# Patient Record
Sex: FEMALE | Race: WHITE | NOT HISPANIC OR LATINO | Employment: PART TIME | URBAN - NONMETROPOLITAN AREA
[De-identification: names, ages, dates, MRNs, and addresses within clinical notes are randomized per-mention and may not be internally consistent; named-entity substitution may affect disease eponyms.]

---

## 2017-04-27 ENCOUNTER — OFFICE VISIT (OUTPATIENT)
Dept: MEDICAL GROUP | Facility: CLINIC | Age: 47
End: 2017-04-27
Payer: MEDICAID

## 2017-04-27 VITALS
DIASTOLIC BLOOD PRESSURE: 80 MMHG | SYSTOLIC BLOOD PRESSURE: 118 MMHG | WEIGHT: 234 LBS | RESPIRATION RATE: 16 BRPM | BODY MASS INDEX: 38.99 KG/M2 | HEART RATE: 83 BPM | HEIGHT: 65 IN | TEMPERATURE: 98.8 F | OXYGEN SATURATION: 98 %

## 2017-04-27 DIAGNOSIS — K21.9 GASTROESOPHAGEAL REFLUX DISEASE WITHOUT ESOPHAGITIS: ICD-10-CM

## 2017-04-27 DIAGNOSIS — E03.9 ACQUIRED HYPOTHYROIDISM: ICD-10-CM

## 2017-04-27 DIAGNOSIS — M54.2 CHRONIC NECK PAIN: ICD-10-CM

## 2017-04-27 DIAGNOSIS — G89.29 CHRONIC NECK PAIN: ICD-10-CM

## 2017-04-27 DIAGNOSIS — J45.909 UNCOMPLICATED ASTHMA, UNSPECIFIED ASTHMA SEVERITY: ICD-10-CM

## 2017-04-27 DIAGNOSIS — Z79.891 CHRONIC USE OF OPIATE FOR THERAPEUTIC PURPOSE: ICD-10-CM

## 2017-04-27 DIAGNOSIS — M54.5 CHRONIC MIDLINE LOW BACK PAIN, WITH SCIATICA PRESENCE UNSPECIFIED: ICD-10-CM

## 2017-04-27 DIAGNOSIS — E55.9 VITAMIN D DEFICIENCY: ICD-10-CM

## 2017-04-27 DIAGNOSIS — M54.50 CHRONIC BILATERAL LOW BACK PAIN WITHOUT SCIATICA: ICD-10-CM

## 2017-04-27 DIAGNOSIS — G89.29 CHRONIC BILATERAL LOW BACK PAIN WITHOUT SCIATICA: ICD-10-CM

## 2017-04-27 DIAGNOSIS — E55.9 UNSPECIFIED VITAMIN D DEFICIENCY: ICD-10-CM

## 2017-04-27 DIAGNOSIS — G89.29 CHRONIC MIDLINE LOW BACK PAIN, WITH SCIATICA PRESENCE UNSPECIFIED: ICD-10-CM

## 2017-04-27 DIAGNOSIS — M79.7 FIBROMYALGIA: ICD-10-CM

## 2017-04-27 PROCEDURE — 99205 OFFICE O/P NEW HI 60 MIN: CPT | Performed by: PHYSICIAN ASSISTANT

## 2017-04-27 RX ORDER — OXYCODONE AND ACETAMINOPHEN 10; 325 MG/1; MG/1
1 TABLET ORAL 3 TIMES DAILY PRN
Qty: 90 TAB | Refills: 0 | Status: SHIPPED | OUTPATIENT
Start: 2017-04-27 | End: 2017-05-30

## 2017-04-27 RX ORDER — GABAPENTIN 100 MG/1
100 CAPSULE ORAL 2 TIMES DAILY
Qty: 90 CAP | Refills: 0 | Status: SHIPPED | OUTPATIENT
Start: 2017-04-27 | End: 2017-05-30

## 2017-04-27 RX ORDER — OXYCODONE AND ACETAMINOPHEN 10; 325 MG/1; MG/1
1 TABLET ORAL 3 TIMES DAILY PRN
Qty: 90 TAB | Refills: 0 | Status: SHIPPED | OUTPATIENT
Start: 2017-04-27 | End: 2017-04-27 | Stop reason: SDUPTHER

## 2017-04-27 RX ORDER — ALBUTEROL SULFATE 90 UG/1
2 AEROSOL, METERED RESPIRATORY (INHALATION) EVERY 6 HOURS PRN
Qty: 1 INHALER | Refills: 3 | Status: SHIPPED | OUTPATIENT
Start: 2017-04-27 | End: 2017-05-30

## 2017-04-27 RX ORDER — OMEPRAZOLE 40 MG/1
40 CAPSULE, DELAYED RELEASE ORAL 2 TIMES DAILY
COMMUNITY
End: 2017-04-27 | Stop reason: SDUPTHER

## 2017-04-27 RX ORDER — LIDOCAINE HYDROCHLORIDE 40 MG/ML
1 SOLUTION TOPICAL PRN
Qty: 1 BOTTLE | Refills: 0 | Status: SHIPPED | OUTPATIENT
Start: 2017-04-27 | End: 2017-05-16 | Stop reason: SDUPTHER

## 2017-04-27 RX ORDER — PROMETHAZINE HYDROCHLORIDE 25 MG/1
25 TABLET ORAL EVERY 6 HOURS PRN
COMMUNITY
End: 2017-04-27 | Stop reason: SDUPTHER

## 2017-04-27 RX ORDER — GABAPENTIN 100 MG/1
100 CAPSULE ORAL 2 TIMES DAILY
COMMUNITY
End: 2017-04-27 | Stop reason: SDUPTHER

## 2017-04-27 RX ORDER — ERGOCALCIFEROL 1.25 MG/1
50000 CAPSULE ORAL
Qty: 12 CAP | Refills: 0 | Status: SHIPPED | OUTPATIENT
Start: 2017-04-27 | End: 2017-09-08

## 2017-04-27 RX ORDER — LEVOTHYROXINE SODIUM 0.12 MG/1
125 TABLET ORAL
COMMUNITY
End: 2017-04-27 | Stop reason: SDUPTHER

## 2017-04-27 RX ORDER — LEVOTHYROXINE SODIUM 0.12 MG/1
125 TABLET ORAL
Qty: 60 TAB | Refills: 3 | Status: SHIPPED | OUTPATIENT
Start: 2017-04-27 | End: 2017-09-08

## 2017-04-27 RX ORDER — LIDOCAINE HYDROCHLORIDE 40 MG/ML
SOLUTION TOPICAL PRN
COMMUNITY
End: 2017-04-27 | Stop reason: SDUPTHER

## 2017-04-27 RX ORDER — PROMETHAZINE HYDROCHLORIDE 25 MG/1
25 TABLET ORAL PRN
Qty: 16 TAB | Refills: 0 | Status: SHIPPED | OUTPATIENT
Start: 2017-04-27 | End: 2017-09-08

## 2017-04-27 RX ORDER — OMEPRAZOLE 40 MG/1
40 CAPSULE, DELAYED RELEASE ORAL 2 TIMES DAILY
Qty: 60 CAP | Refills: 2 | Status: SHIPPED | OUTPATIENT
Start: 2017-04-27 | End: 2017-05-30

## 2017-04-27 ASSESSMENT — PAIN SCALES - GENERAL: PAINLEVEL: 8=MODERATE-SEVERE PAIN

## 2017-04-27 ASSESSMENT — PATIENT HEALTH QUESTIONNAIRE - PHQ9: CLINICAL INTERPRETATION OF PHQ2 SCORE: 0

## 2017-04-27 NOTE — ASSESSMENT & PLAN NOTE
"On discussion of her renewal for her opioids she was asked to choose between her Percocet and her tizanidine and the patient became extremely agitated. She states that she has been on this regimen for approximately 6 years and asked \"So you're smarter than these 12 other doctors that have treated me in the past? You know better then these super-specialists who have been treating me for years?\" Patient was extremely rude however when asked if she would rather find a different physician she agreed to accept the 90 Percocet that were offered.  "

## 2017-04-27 NOTE — ASSESSMENT & PLAN NOTE
Patient states this is an ongoing problem that is well-controlled with her Percocet and she would like to be seen by a rheumatologist for this. We will discuss this at a later visit.
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
## 2017-04-27 NOTE — MR AVS SNAPSHOT
"        Jayashree SANCHEZ Danilo   2017 3:00 PM   Office Visit   MRN: [id number]    Department:  [facility] Phone:  [phone]    Description:  Female : 1970   Provider:  Elenita Friend PA-C           Reason for Visit     Other new to you    Medication Refill           Allergies as of 2017     Allergen Noted Reactions    Cipro Xr 2010   Rash    Sulfa Drugs 2008         You were diagnosed with     Chronic use of opiate for therapeutic purpose   [6386999]       Chronic neck pain   [081131]       Gastroesophageal reflux disease without esophagitis   [973429]       Unspecified vitamin D deficiency   [268.9.ICD-9-CM]       Chronic bilateral low back pain without sciatica   [8722205]       Uncomplicated asthma, unspecified asthma severity   [9535299]       Acquired hypothyroidism   [2159163]       Chronic midline low back pain, with sciatica presence unspecified   [4657107]         Vital Signs     Blood Pressure Pulse Temperature Respirations Height Weight    118/80 mmHg 83 37.1 °C (98.8 °F) 16 1.651 m (5' 5\") 106.142 kg (234 lb)    Body Mass Index Oxygen Saturation Smoking Status             38.94 kg/m2 98% Never Smoker          Basic Information     Date Of Birth Sex Race Ethnicity Preferred Language    1970 Female White Non- English      Your appointments     May 18, 2017  1:00 PM   New Patient with Nevin Grant M.D.   [facility] [address] [address] [phone]           Please bring Photo ID, Insurance Cards, All Medication Bottles and copies of any legal documents (such as Living Will, Power of ) If speaking a language besides English please bring an adult . Please arrive 30 minutes prior for check in and registration. You will be receiving a confirmation call a few days before your appointment from our automated call confirmation system.              Problem List             " ICD-10-CM Priority Class Noted - Resolved    Hypothyroidism E03.9   6/7/2012 - Present    Fibromyalgia M79.7   6/7/2012 - Present    Chronic low back pain M54.5, G89.29   4/12/2014 - Present    Chronic neck pain M54.2, G89.29   4/12/2014 - Present    Vitamin D deficiency E55.9   4/12/2014 - Present    Dysuria R30.0   4/12/2014 - Present      Health Maintenance        Date Due Completion Dates    IMM DTaP/Tdap/Td Vaccine (1 - Tdap) 6/29/1989 ---    PAP SMEAR 6/29/1991 ---    MAMMOGRAM 6/29/2010 3/17/2009, 3/17/2009            Current Immunizations     No immunizations on file.      Below and/or attached are the medications your provider expects you to take. Review all of your home medications and newly ordered medications with your provider and/or pharmacist. Follow medication instructions as directed by your provider and/or pharmacist. Please keep your medication list with you and share with your provider. Update the information when medications are discontinued, doses are changed, or new medications (including over-the-counter products) are added; and carry medication information at all times in the event of emergency situations     Allergies:  CIPRO XR - Rash     SULFA DRUGS - (reactions not documented)               Medications  Valid as of: April 27, 2017 -  4:37 PM    Generic Name Brand Name Tablet Size Instructions for use    Albuterol Sulfate (Aero Soln) albuterol 108 (90 BASE) MCG/ACT Inhale 2 Puffs by mouth every 6 hours as needed for Shortness of Breath.        Ergocalciferol (Cap) DRISDOL 74458 UNITS Take 1 Cap by mouth every 7 days.        Gabapentin (Cap) NEURONTIN 100 MG Take 1 Cap by mouth 2 Times a Day.        Levothyroxine Sodium (Tab) SYNTHROID 125 MCG Take 1 Tab by mouth Every morning on an empty stomach. As well as PO at least 1 hour after dinner.        Lidocaine HCl (Solution) XYLOCAINE 4 % Apply 1 mL to affected area(s) as needed (for pain).        Omeprazole (CAPSULE DELAYED RELEASE) PRILOSEC  40 MG Take 1 Cap by mouth 2 times a day.        Oxycodone-Acetaminophen (Tab) PERCOCET-10  MG Take 1 Tab by mouth 3 times a day as needed for Severe Pain.        Promethazine HCl (Tab) PHENERGAN 25 MG Take 1 Tab by mouth as needed for Nausea/Vomiting.        TiZANidine HCl (Tab) ZANAFLEX 4 MG Take 2 Tabs by mouth every 8 hours as needed.        .                 Medicines prescribed today were sent to:     [facility] - [place], [place] - [address] [facility]    [address] [facility] [address] [facility] [address] [facility] [address]    Phone: [phone] Fax: [phone]    Open 24 Hours?: No      Medication refill instructions:       If your prescription bottle indicates you have medication refills left, it is not necessary to call your provider’s office. Please contact your pharmacy and they will refill your medication.    If your prescription bottle indicates you do not have any refills left, you may request refills at any time through one of the following ways: The online [employer] system (except Urgent Care), by calling your provider’s office, or by asking your pharmacy to contact your provider’s office with a refill request. Medication refills are processed only during regular business hours and may not be available until the next business day. Your provider may request additional information or to have a follow-up visit with you prior to refilling your medication.   *Please Note: Medication refills are assigned a new Rx number when refilled electronically. Your pharmacy may indicate that no refills were authorized even though a new prescription for the same medication is available at the pharmacy. Please request the medicine by name with the pharmacy before contacting your provider for a refill.        Your To Do List     Future Labs/Procedures Complete By Expires    DX-CERVICAL SPINE-2 OR 3 VIEWS  As directed 4/27/2018    DX-LUMBAR SPINE-2 OR 3 VIEWS  As directed 4/27/2018    DX-THORACIC SPINE-2 VIEWS  As directed  4/27/2018    [facility] PAIN MANAGEMENT SCREEN  As directed 4/27/2018    Comments:    Current Meds (name, sig, last dose):   Current outpatient prescriptions:   •  albuterol, 2 Puff, Inhalation, Q6HRS PRN  •  tramadol,  mg, Oral, Q4HRS PRN  •  tizanidine, 8 mg, Oral, Q8HRS PRN  •  pantoprazole, 20 mg, Oral, DAILY  •  vitamin D (Ergocalciferol), 50,000 Units, Oral, Q7 DAYS  •  oxycodone-acetaminophen, 1 Tab, Oral, Q6HRS PRN  •  levothyroxine, 112 mcg, Oral, BID            Referral     A referral request has been sent to our patient care coordination department. Please allow 3-5 business days for us to process this request and contact you either by phone or mail. If you do not hear from us by the 5th business day, please call us at [phone].           [employer] Access Code: [email or web address]  Expires: 5/27/2017  4:37 PM    Your email address is not on file at [facility].  Email Addresses are required for you to sign up for [employer], please contact [phone] to verify your personal information and to provide your email address prior to attempting to register for [employer].    Jayashree Carrasco  [address] [place] [id number]    [employer]  A secure, online tool to manage your health information     [facility]’s [employer]® is a secure, online tool that connects you to your personalized health information from the privacy of your home -- day or night - making it very easy for you to manage your healthcare. Once the activation process is completed, you can even access your medical information using the [employer] jasmeet, which is available for free in the Apple Jasmeet store or Google Play store.     To learn more about [employer], visit [email or web address]    There are two levels of access available (as shown below):   My Chart Features  [facility]  Specialists [facility]  Urgent  Care Non-[facility] Doctor   Email your healthcare team securely and privately 24/7 X X X    Manage  appointments: schedule your next appointment; view details of past/upcoming appointments X      Request prescription refills. X      View recent personal medical records, including lab and immunizations X X X X   View health record, including health history, allergies, medications X X X X   Read reports about your outpatient visits, procedures, consult and ER notes X X X X   See your discharge summary, which is a recap of your hospital and/or ER visit that includes your diagnosis, lab results, and care plan X X  X     How to register for [employer]:  Once your e-mail address has been verified, follow the following steps to sign up for [employer].     1. Go to  [email or web address]  2. Click on the Sign Up Now box, which takes you to the New Member Sign Up page. You will need to provide the following information:  a. Enter your Us[employer] Access Code exactly as it appears at the top of this page. (You will not need to use this code after you’ve completed the sign-up process. If you do not sign up before the expiration date, you must request a new code.)   b. Enter your date of birth.   c. Enter your home email address.   d. Click Submit, and follow the next screen’s instructions.  3. Create a Us[employer]illa ID. This will be your [employer] login ID and cannot be changed, so think of one that is secure and easy to remember.  4. Create a [employer] password. You can change your password at any time.  5. Enter your Password Reset Question and Answer. This can be used at a later time if you forget your password.   6. Enter your e-mail address. This allows you to receive e-mail notifications when new information is available in Us[employer]illa.  7. Click Sign Up. You can now view your health information.    For assistance activating your [employer] account, call [phone]

## 2017-05-02 ENCOUNTER — TELEPHONE (OUTPATIENT)
Dept: MEDICAL GROUP | Facility: CLINIC | Age: 47
End: 2017-05-02

## 2017-05-02 DIAGNOSIS — R89.2 ABNORMAL DRUG SCREEN: ICD-10-CM

## 2017-05-02 NOTE — TELEPHONE ENCOUNTER
Your information has been submitted to [place] [employer].  If [place] [employer] has not responded in 3-5 business days or if you have any questions about your PA submission, contact [place] [employer] at [phone].

## 2017-05-04 NOTE — TELEPHONE ENCOUNTER
DANK Vaughn, Med Ass't        Caller: Unspecified (Yesterday, 7:26 AM)                     This patient can get prilosec over the counter if medicaid won't pay for it.

## 2017-05-16 DIAGNOSIS — M54.2 CHRONIC NECK PAIN: ICD-10-CM

## 2017-05-16 DIAGNOSIS — G89.29 CHRONIC NECK PAIN: ICD-10-CM

## 2017-05-16 DIAGNOSIS — Z79.891 CHRONIC USE OF OPIATE FOR THERAPEUTIC PURPOSE: ICD-10-CM

## 2017-05-18 ENCOUNTER — OFFICE VISIT (OUTPATIENT)
Dept: URGENT CARE | Facility: PHYSICIAN GROUP | Age: 47
End: 2017-05-18
Payer: MEDICAID

## 2017-05-18 ENCOUNTER — APPOINTMENT (OUTPATIENT)
Dept: RADIOLOGY | Facility: IMAGING CENTER | Age: 47
End: 2017-05-18
Attending: NURSE PRACTITIONER
Payer: MEDICAID

## 2017-05-18 ENCOUNTER — TELEPHONE (OUTPATIENT)
Dept: MEDICAL GROUP | Facility: CLINIC | Age: 47
End: 2017-05-18

## 2017-05-18 ENCOUNTER — HOSPITAL ENCOUNTER (OUTPATIENT)
Facility: MEDICAL CENTER | Age: 47
End: 2017-05-18
Attending: NURSE PRACTITIONER
Payer: MEDICAID

## 2017-05-18 VITALS
TEMPERATURE: 97.7 F | OXYGEN SATURATION: 97 % | SYSTOLIC BLOOD PRESSURE: 126 MMHG | HEART RATE: 84 BPM | DIASTOLIC BLOOD PRESSURE: 80 MMHG | RESPIRATION RATE: 20 BRPM | HEIGHT: 64 IN | WEIGHT: 236 LBS | BODY MASS INDEX: 40.29 KG/M2

## 2017-05-18 DIAGNOSIS — J02.9 SORE THROAT: ICD-10-CM

## 2017-05-18 DIAGNOSIS — R31.9 HEMATURIA: ICD-10-CM

## 2017-05-18 DIAGNOSIS — J06.9 URI WITH COUGH AND CONGESTION: ICD-10-CM

## 2017-05-18 DIAGNOSIS — R05.8 PRODUCTIVE COUGH: ICD-10-CM

## 2017-05-18 DIAGNOSIS — R31.9 URINARY TRACT INFECTION WITH HEMATURIA, SITE UNSPECIFIED: ICD-10-CM

## 2017-05-18 DIAGNOSIS — R30.0 DYSURIA: ICD-10-CM

## 2017-05-18 DIAGNOSIS — N39.0 URINARY TRACT INFECTION WITH HEMATURIA, SITE UNSPECIFIED: ICD-10-CM

## 2017-05-18 LAB
APPEARANCE UR: CLEAR
BILIRUB UR STRIP-MCNC: NORMAL MG/DL
COLOR UR AUTO: NORMAL
GLUCOSE UR STRIP.AUTO-MCNC: NORMAL MG/DL
KETONES UR STRIP.AUTO-MCNC: 5 MG/DL
LEUKOCYTE ESTERASE UR QL STRIP.AUTO: NORMAL
NITRITE UR QL STRIP.AUTO: NORMAL
PH UR STRIP.AUTO: 6 [PH] (ref 5–8)
PROT UR QL STRIP: NORMAL MG/DL
RBC UR QL AUTO: NORMAL
SP GR UR STRIP.AUTO: 1.03
UROBILINOGEN UR STRIP-MCNC: NORMAL MG/DL

## 2017-05-18 PROCEDURE — 74000 DX-ABDOMEN-1 VIEW: CPT | Performed by: FAMILY MEDICINE

## 2017-05-18 PROCEDURE — 87086 URINE CULTURE/COLONY COUNT: CPT

## 2017-05-18 PROCEDURE — 99204 OFFICE O/P NEW MOD 45 MIN: CPT | Mod: 25 | Performed by: NURSE PRACTITIONER

## 2017-05-18 PROCEDURE — 81002 URINALYSIS NONAUTO W/O SCOPE: CPT | Performed by: NURSE PRACTITIONER

## 2017-05-18 RX ORDER — BENZONATATE 100 MG/1
100 CAPSULE ORAL 3 TIMES DAILY PRN
Qty: 60 CAP | Refills: 0 | Status: SHIPPED | OUTPATIENT
Start: 2017-05-18 | End: 2017-05-30

## 2017-05-18 RX ORDER — ALBUTEROL SULFATE 2.5 MG/3ML
2.5 SOLUTION RESPIRATORY (INHALATION) EVERY 4 HOURS PRN
Qty: 75 ML | Refills: 0 | Status: SHIPPED | OUTPATIENT
Start: 2017-05-18 | End: 2017-05-30 | Stop reason: SDUPTHER

## 2017-05-18 RX ORDER — LIDOCAINE HYDROCHLORIDE 40 MG/ML
1 SOLUTION TOPICAL PRN
Qty: 1 BOTTLE | Refills: 0 | Status: SHIPPED | OUTPATIENT
Start: 2017-05-18 | End: 2017-05-30

## 2017-05-18 RX ORDER — ALBUTEROL SULFATE 90 UG/1
2 AEROSOL, METERED RESPIRATORY (INHALATION) EVERY 6 HOURS PRN
Qty: 8.5 G | Refills: 0 | Status: SHIPPED | OUTPATIENT
Start: 2017-05-18 | End: 2017-11-07 | Stop reason: SDUPTHER

## 2017-05-18 RX ORDER — CEFDINIR 300 MG/1
300 CAPSULE ORAL 2 TIMES DAILY
Qty: 20 CAP | Refills: 0 | Status: SHIPPED | OUTPATIENT
Start: 2017-05-18 | End: 2017-05-30

## 2017-05-18 ASSESSMENT — ENCOUNTER SYMPTOMS
HEADACHES: 0
FEVER: 0
PALPITATIONS: 0
SHORTNESS OF BREATH: 0
FLANK PAIN: 0
NAUSEA: 0
MYALGIAS: 1
WEAKNESS: 0
EYE REDNESS: 0
EYE DISCHARGE: 0
BACK PAIN: 1
CHILLS: 0
SPUTUM PRODUCTION: 1
DIZZINESS: 0
SORE THROAT: 1
CONSTIPATION: 0
VOMITING: 0
WHEEZING: 0
DIARRHEA: 0
ABDOMINAL PAIN: 0
ORTHOPNEA: 0
COUGH: 1

## 2017-05-18 NOTE — TELEPHONE ENCOUNTER
1. Caller Name: pt.                                         Call Back Number: [phone] (home) [phone]      Patient approves a detailed voicemail message: N\A    2. Patient is requesting lab results dated: [facility] 4/27/17     Pt. Called stating she was seen in [facility] and was provided her after visit disha that stated she had an abnormal drug screen. She would like clarification on this and why it shows abnormal.

## 2017-05-18 NOTE — PROGRESS NOTES
Subjective:      Jayashree Carrasco is a 46 y.o. female who presents with Cough            Cough  Associated symptoms include ear pain, myalgias and a sore throat. Pertinent negatives include no chest pain, chills, eye redness, fever, headaches, shortness of breath or wheezing. There is no history of environmental allergies.   Jayashree is a 46 year old female who is here for productive cough x 1 week. C/o nasal congestion, sore throat, no fever, PND, ear pressure. Denies dizziness, sinus HA, denies sinus facial pressure. Grandson has cough as well. Taking no OTC med for cough. No saline nasal flushing, nasal spray or salt water gargling. Denies SOB, chest tightness or wheeze. Denies asthma. Nonsmoker.    C/o dysuria, frequency. States has had darker urine and some back pain. Has had kidney infection before. Denies n/v/d, abdominal pain. H/o chronic back pain and takes Percocet. States full hysterectomy.    PMH:  has a past medical history of Cancer (CMS-[facility]); Psychiatric disorder; Fibromyalgia; ASTHMA; GERD (gastroesophageal reflux disease); Chronic low back pain (4/12/2014); Chronic neck pain (4/12/2014); and Unspecified vitamin D deficiency (4/12/2014).  MEDS:   Current outpatient prescriptions:   •  cefdinir (OMNICEF) 300 MG Cap, Take 1 Cap by mouth 2 times a day., Disp: 20 Cap, Rfl: 0  •  lidocaine viscous 2% (XYLOCAINE) 2 % Solution, Take 15 mL by mouth 4 times a day as needed for Throat/Mouth Pain for up to 3 days. Gargle and spit out, Disp: 180 mL, Rfl: 0  •  benzonatate (TESSALON) 100 MG Cap, Take 1 Cap by mouth 3 times a day as needed for Cough., Disp: 60 Cap, Rfl: 0  •  albuterol 108 (90 BASE) MCG/ACT Aero Soln inhalation aerosol, Inhale 2 Puffs by mouth every 6 hours as needed for Shortness of Breath., Disp: 1 Inhaler, Rfl: 3  •  gabapentin (NEURONTIN) 100 MG Cap, Take 1 Cap by mouth 2 Times a Day., Disp: 90 Cap, Rfl: 0  •  levothyroxine (SYNTHROID) 125 MCG Tab, Take 1 Tab by mouth Every morning on an empty  stomach. As well as PO at least 1 hour after dinner., Disp: 60 Tab, Rfl: 3  •  omeprazole (PRILOSEC) 40 MG delayed-release capsule, Take 1 Cap by mouth 2 times a day., Disp: 60 Cap, Rfl: 2  •  promethazine (PHENERGAN) 25 MG Tab, Take 1 Tab by mouth as needed for Nausea/Vomiting., Disp: 16 Tab, Rfl: 0  •  vitamin D, Ergocalciferol, (DRISDOL) 71768 UNITS Cap capsule, Take 1 Cap by mouth every 7 days., Disp: 12 Cap, Rfl: 0  •  oxycodone-acetaminophen (PERCOCET-10)  MG Tab, Take 1 Tab by mouth 3 times a day as needed for Severe Pain., Disp: 90 Tab, Rfl: 0  •  tizanidine (ZANAFLEX) 4 MG TABS, Take 2 Tabs by mouth every 8 hours as needed. (Patient taking differently: Take 6 mg by mouth every 8 hours as needed.), Disp: 120 Tab, Rfl: 2  •  lidocaine (XYLOCAINE) 4 % Solution, Apply 1 mL to affected area(s) as needed (for pain)., Disp: 1 Bottle, Rfl: 0  ALLERGIES:   Allergies   Allergen Reactions   • Cipro Xr Rash   • Sulfa Drugs      SURGHX:   Past Surgical History   Procedure Laterality Date   • Cholecystectomy     • Thyroidectomy     • Other abdominal surgery     • Other       SOCHX:  reports that she has never smoked. She has never used smokeless tobacco. She reports that she does not drink alcohol or use illicit drugs.  FH: Family history was reviewed, no pertinent findings to report      Review of Systems   Constitutional: Negative for fever, chills and malaise/fatigue.   HENT: Positive for congestion, ear pain and sore throat.    Eyes: Negative for discharge and redness.   Respiratory: Positive for cough and sputum production. Negative for shortness of breath and wheezing.    Cardiovascular: Negative for chest pain, palpitations and orthopnea.   Gastrointestinal: Negative for nausea, vomiting, abdominal pain, diarrhea and constipation.   Genitourinary: Positive for dysuria, urgency and frequency. Negative for hematuria and flank pain.   Musculoskeletal: Positive for myalgias and back pain.   Neurological:  "Negative for dizziness, weakness and headaches.   Endo/Heme/Allergies: Negative for environmental allergies.   All other systems reviewed and are negative.         Objective:     /80 mmHg  Pulse 84  Temp(Src) 36.5 °C (97.7 °F)  Resp 20  Ht 1.626 m (5' 4\")  Wt 107.049 kg (236 lb)  BMI 40.49 kg/m2  SpO2 97%     Physical Exam   Constitutional: She is oriented to person, place, and time. Vital signs are normal. She appears well-developed and well-nourished.  Non-toxic appearance. She does not have a sickly appearance. She appears ill. No distress.   HENT:   Head: Normocephalic.   Right Ear: External ear and ear canal normal. Tympanic membrane is bulging. A middle ear effusion is present.   Left Ear: External ear and ear canal normal. Tympanic membrane is bulging. A middle ear effusion is present.   Nose: Mucosal edema and rhinorrhea present. No sinus tenderness.   Mouth/Throat: Uvula is midline. Mucous membranes are dry. No uvula swelling. Posterior oropharyngeal erythema present. No posterior oropharyngeal edema.   Eyes: Conjunctivae and EOM are normal. Pupils are equal, round, and reactive to light.   Neck: Normal range of motion. Neck supple.   Cardiovascular: Normal rate and regular rhythm.    Pulmonary/Chest: Effort normal and breath sounds normal. No accessory muscle usage. No respiratory distress. She has no decreased breath sounds. She has no wheezes. She has no rhonchi. She has no rales.   Musculoskeletal: Normal range of motion.   Lymphadenopathy:     She has no cervical adenopathy.   Neurological: She is oriented to person, place, and time.   Skin: Skin is warm and dry. She is not diaphoretic.   Vitals reviewed.          POC Color dk yellow Negative Final      POC Appearance clear Negative Final     POC Leukocyte Esterase neg Negative Final     POC Nitrites neg Negative Final     POC Urobiligen neg Negative (0.2) mg/dL Final     POC Protein neg Negative mg/dL Final     POC Urine PH 6.0 5.0 - 8.0 " Final     POC Blood small Negative Final     POC Specific Gravity 1.030 <1.005 - >1.030 Final     POC Ketones 5 Negative mg/dL Final     POC Biliruben neg Negative mg/dL Final     POC Glucose neg Negative mg/dL Final     KUB xray:  1.  No dilated bowel or evidence for constipation  2.  No opaque calculi.  3.  Facet arthropathy of the spine       Assessment/Plan:     1. Dysuria    2. Hematuria    - POCT Urinalysis  - [id number] VIEW; Future  - URINE CULTURE(NEW); Future    3. Sore throat    - POCT Rapid Strep A: NEG  - lidocaine viscous 2% (XYLOCAINE) 2 % Solution; Take 15 mL by mouth 4 times a day as needed for Throat/Mouth Pain for up to 3 days. Gargle and spit out  Dispense: 180 mL; Refill: 0    4. Urinary tract infection with hematuria, site unspecified    - cefdinir (OMNICEF) 300 MG Cap; Take 1 Cap by mouth 2 times a day.  Dispense: 20 Cap; Refill: 0    5. Productive cough    - benzonatate (TESSALON) 100 MG Cap; Take 1 Cap by mouth 3 times a day as needed for Cough.  Dispense: 60 Cap; Refill: 0  - albuterol 108 (90 BASE) MCG/ACT Aero Soln inhalation aerosol; Inhale 2 Puffs by mouth every 6 hours as needed for Shortness of Breath.  Dispense: 8.5 g; Refill: 0  - albuterol (PROVENTIL) 2.5mg/3ml Nebu Soln solution for nebulization; 3 mL by Nebulization route every four hours as needed for Shortness of Breath.  Dispense: 75 mL; Refill: 0    6. URI with cough and congestion    - cefdinir (OMNICEF) 300 MG Cap; Take 1 Cap by mouth 2 times a day.  Dispense: 20 Cap; Refill: 0  - benzonatate (TESSALON) 100 MG Cap; Take 1 Cap by mouth 3 times a day as needed for Cough.  Dispense: 60 Cap; Refill: 0  - albuterol 108 (90 BASE) MCG/ACT Aero Soln inhalation aerosol; Inhale 2 Puffs by mouth every 6 hours as needed for Shortness of Breath.  Dispense: 8.5 g; Refill: 0  - albuterol (PROVENTIL) 2.5mg/3ml Nebu Soln solution for nebulization; 3 mL by Nebulization route every four hours as needed for Shortness of Breath.  Dispense: 75  mL; Refill: 0    Increase water intake  May use Ibuprofen/Tylenol prn for fever or body aches  Get rest  May use daily longer acting antihistamine like Claritin prn for allergy symptoms  May use saline nasal spray/flonase prn to flush nasal congestion  Use inhaler prn for cough, SOB  May use OTC cough suppressant medications like Robitussin prn  Monitor for fevers, productive cough, SOB, CP, chest tightness- need re-evaluation    Increase water intake  Urinate more frequently and empty bladder completely  Practice good toileting hygiene after bowel movements and sexual intercourse, refrain from sexual intercourse until infection cleared  Monitor for back/flank pain, difficulty urinating, blood in urine- need re-evaluation

## 2017-05-18 NOTE — MR AVS SNAPSHOT
"Lydiae LAURA Carrasco   2017 1:40 PM   Office Visit   MRN: [id number]    Department:  [facility]   Dept Phone:  [phone]    Description:  Female : 1970   Provider:  SHOLA Quinn           Reason for Visit     Cough nasal/ chest congestion, ear pain x 1.5 week      Allergies as of 2017     Allergen Noted Reactions    Cipro Xr 2010   Rash    Sulfa Drugs 2008         You were diagnosed with     Dysuria   [788.1.ICD-9-CM]       Hematuria   [1557055]       Sore throat   [476613]       Urinary tract infection with hematuria, site unspecified   [4127163]       Productive cough   [107539]       URI with cough and congestion   [6174025]         Vital Signs     Blood Pressure Pulse Temperature Respirations Height Weight    126/80 mmHg 84 36.5 °C (97.7 °F) 20 1.626 m (5' 4\") 107.049 kg (236 lb)    Body Mass Index Oxygen Saturation Smoking Status             40.49 kg/m2 97% Never Smoker          Basic Information     Date Of Birth Sex Race Ethnicity Preferred Language    1970 Female White Non- English      Your appointments     May 24, 2017  3:00 PM   Established Patient with Elenita Friend PA-C   [facility] (--)    [address] [place] [id number] [phone]           You will be receiving a confirmation call a few days before your appointment from our automated call confirmation system.              Problem List              ICD-10-CM Priority Class Noted - Resolved    Hypothyroidism E03.9   2012 - Present    Fibromyalgia M79.7   2012 - Present    Chronic low back pain M54.5, G89.29   2014 - Present    Chronic neck pain M54.2, G89.29   2014 - Present    Vitamin D deficiency E55.9   2014 - Present    Dysuria R30.0   2014 - Present    Chronic use of opiate for therapeutic purpose Z79.891   2017 - Present    Abnormal drug screen R89.2   2017 - Present      Health Maintenance       " Date Due Completion Dates    IMM DTaP/Tdap/Td Vaccine (1 - Tdap) 6/29/1989 ---    PAP SMEAR 6/29/1991 ---    MAMMOGRAM 6/29/2010 3/17/2009, 3/17/2009            Results     POCT Urinalysis      Component Value Standard Range & Units    POC Color dk yellow Negative    POC Appearance clear Negative    POC Leukocyte Esterase neg Negative    POC Nitrites neg Negative    POC Urobiligen neg Negative (0.2) mg/dL    POC Protein neg Negative mg/dL    POC Urine PH 6.0 5.0 - 8.0    POC Blood small Negative    POC Specific Gravity 1.030 <1.005 - >1.030    POC Ketones 5 Negative mg/dL    POC Biliruben neg Negative mg/dL    POC Glucose neg Negative mg/dL                        Current Immunizations     No immunizations on file.      Below and/or attached are the medications your provider expects you to take. Review all of your home medications and newly ordered medications with your provider and/or pharmacist. Follow medication instructions as directed by your provider and/or pharmacist. Please keep your medication list with you and share with your provider. Update the information when medications are discontinued, doses are changed, or new medications (including over-the-counter products) are added; and carry medication information at all times in the event of emergency situations     Allergies:  CIPRO XR - Rash     SULFA DRUGS - (reactions not documented)               Medications  Valid as of: May 18, 2017 -  3:18 PM    Generic Name Brand Name Tablet Size Instructions for use    Albuterol Sulfate (Aero Soln) albuterol 108 (90 BASE) MCG/ACT Inhale 2 Puffs by mouth every 6 hours as needed for Shortness of Breath.        Benzonatate (Cap) TESSALON 100 MG Take 1 Cap by mouth 3 times a day as needed for Cough.        Cefdinir (Cap) OMNICEF 300 MG Take 1 Cap by mouth 2 times a day.        Ergocalciferol (Cap) DRISDOL 43024 UNITS Take 1 Cap by mouth every 7 days.        Gabapentin (Cap) NEURONTIN 100 MG Take 1 Cap by mouth 2 Times a  Day.        Levothyroxine Sodium (Tab) SYNTHROID 125 MCG Take 1 Tab by mouth Every morning on an empty stomach. As well as PO at least 1 hour after dinner.        Lidocaine HCl (Solution) XYLOCAINE 4 % Apply 1 mL to affected area(s) as needed (for pain).        Lidocaine HCl (Solution) XYLOCAINE 2 % Take 15 mL by mouth 4 times a day as needed for Throat/Mouth Pain for up to 3 days. Gargle and spit out        Omeprazole (CAPSULE DELAYED RELEASE) PRILOSEC 40 MG Take 1 Cap by mouth 2 times a day.        Oxycodone-Acetaminophen (Tab) PERCOCET-10  MG Take 1 Tab by mouth 3 times a day as needed for Severe Pain.        Promethazine HCl (Tab) PHENERGAN 25 MG Take 1 Tab by mouth as needed for Nausea/Vomiting.        TiZANidine HCl (Tab) ZANAFLEX 4 MG Take 2 Tabs by mouth every 8 hours as needed.        .                 Medicines prescribed today were sent to:     [facility] - [address] [facility] [address]    Phone: [phone] Fax: [phone]    Open 24 Hours?: No      Medication refill instructions:       If your prescription bottle indicates you have medication refills left, it is not necessary to call your provider’s office. Please contact your pharmacy and they will refill your medication.    If your prescription bottle indicates you do not have any refills left, you may request refills at any time through one of the following ways: The online [email or web address] system (except Urgent Care), by calling your provider’s office, or by asking your pharmacy to contact your provider’s office with a refill request. Medication refills are processed only during regular business hours and may not be available until the next business day. Your provider may request additional information or to have a follow-up visit with you prior to refilling your medication.   *Please Note: Medication refills are assigned a new Rx number when refilled electronically. Your pharmacy  may indicate that no refills were authorized even though a new prescription for the same medication is available at the pharmacy. Please request the medicine by name with the pharmacy before contacting your provider for a refill.        Your To Do List     Future Labs/Procedures Complete By Expires    [id number] VIEW  As directed 11/18/2017    URINE CULTURE(NEW)  As directed 5/19/2018         CiraNova Access Code: [email or web address]  Expires: 5/27/2017  4:37 PM    Your email address is not on file at [facility].  Email Addresses are required for you to sign up for CiraNova, please contact [phone] to verify your personal information and to provide your email address prior to attempting to register for CiraNova.    Jayashree Carrasco  [facility] [place] [id number]    CiraNova  A secure, online tool to manage your health information     [facility]’s CiraNova® is a secure, online tool that connects you to your personalized health information from the privacy of your home -- day or night - making it very easy for you to manage your healthcare. Once the activation process is completed, you can even access your medical information using the CiraNova jasmeet, which is available for free in the Apple Jasmeet store or Google Play store.     To learn more about CiraNova, visit [email or web address]    There are two levels of access available (as shown below):   My Chart Features  [facility] Primary Care Doctor [facility]  Specialists [facility]  Urgent  Care Non-[facility] Primary Care Doctor   Email your healthcare team securely and privately 24/7 X X X    Manage appointments: schedule your next appointment; view details of past/upcoming appointments X      Request prescription refills. X      View recent personal medical records, including lab and immunizations X X X X   View health record, including health history, allergies, medications X X X X   Read reports about your outpatient visits, procedures, consult and ER notes X X X X    See your discharge summary, which is a recap of your hospital and/or ER visit that includes your diagnosis, lab results, and care plan X X  X     How to register for [employer]:  Once your e-mail address has been verified, follow the following steps to sign up for CommonBond.     1. Go to  [email or web address]  2. Click on the Sign Up Now box, which takes you to the New Member Sign Up page. You will need to provide the following information:  a. Enter your CommonBond Access Code exactly as it appears at the top of this page. (You will not need to use this code after you’ve completed the sign-up process. If you do not sign up before the expiration date, you must request a new code.)   b. Enter your date of birth.   c. Enter your home email address.   d. Click Submit, and follow the next screen’s instructions.  3. Create a CommonBond ID. This will be your CommonBond login ID and cannot be changed, so think of one that is secure and easy to remember.  4. Create a CommonBond password. You can change your password at any time.  5. Enter your Password Reset Question and Answer. This can be used at a later time if you forget your password.   6. Enter your e-mail address. This allows you to receive e-mail notifications when new information is available in CommonBond.  7. Click Sign Up. You can now view your health information.    For assistance activating your CommonB[employer] account, call [phone]

## 2017-05-20 ENCOUNTER — TELEPHONE (OUTPATIENT)
Dept: MEDICAL GROUP | Facility: CLINIC | Age: 47
End: 2017-05-20

## 2017-05-20 LAB
BACTERIA UR CULT: ABNORMAL
BACTERIA UR CULT: ABNORMAL
SIGNIFICANT IND 70042: ABNORMAL
SITE SITE: ABNORMAL
SOURCE SOURCE: ABNORMAL

## 2017-05-20 NOTE — TELEPHONE ENCOUNTER
1. Caller Name: Jayashree Carrasco                                      Call Back Number: [phone]      Patient approves a detailed voicemail message: yes    Patient called asking about her upcoming appointment time.  She also had questions on why on her AVS it said she had a abnormal drug screen.  I tried to explain it to her she will talk to you about it on Wednesday at her appointment.

## 2017-05-21 ENCOUNTER — APPOINTMENT (OUTPATIENT)
Dept: RADIOLOGY | Facility: MEDICAL CENTER | Age: 47
End: 2017-05-21
Attending: EMERGENCY MEDICINE
Payer: MEDICAID

## 2017-05-21 ENCOUNTER — HOSPITAL ENCOUNTER (EMERGENCY)
Facility: MEDICAL CENTER | Age: 47
End: 2017-05-21
Attending: EMERGENCY MEDICINE
Payer: MEDICAID

## 2017-05-21 VITALS
WEIGHT: 231 LBS | HEIGHT: 64 IN | RESPIRATION RATE: 24 BRPM | TEMPERATURE: 98.1 F | SYSTOLIC BLOOD PRESSURE: 107 MMHG | DIASTOLIC BLOOD PRESSURE: 72 MMHG | BODY MASS INDEX: 39.44 KG/M2 | HEART RATE: 64 BPM | OXYGEN SATURATION: 95 %

## 2017-05-21 DIAGNOSIS — J40 BRONCHITIS: ICD-10-CM

## 2017-05-21 DIAGNOSIS — D72.829 LEUKOCYTOSIS, UNSPECIFIED TYPE: ICD-10-CM

## 2017-05-21 DIAGNOSIS — R07.1 PAIN OF ANTERIOR CHEST WALL WITH RESPIRATION: ICD-10-CM

## 2017-05-21 LAB
ALBUMIN SERPL BCP-MCNC: 3.9 G/DL (ref 3.2–4.9)
ALBUMIN/GLOB SERPL: 1.3 G/DL
ALP SERPL-CCNC: 66 U/L (ref 30–99)
ALT SERPL-CCNC: 8 U/L (ref 2–50)
ANION GAP SERPL CALC-SCNC: 9 MMOL/L (ref 0–11.9)
APPEARANCE UR: CLEAR
APTT PPP: 29.2 SEC (ref 24.7–36)
AST SERPL-CCNC: 9 U/L (ref 12–45)
BASOPHILS # BLD AUTO: 0.2 % (ref 0–1.8)
BASOPHILS # BLD: 0.04 K/UL (ref 0–0.12)
BILIRUB SERPL-MCNC: 0.3 MG/DL (ref 0.1–1.5)
BILIRUB UR QL STRIP.AUTO: NEGATIVE
BNP SERPL-MCNC: 32 PG/ML (ref 0–100)
BUN SERPL-MCNC: 10 MG/DL (ref 8–22)
CALCIUM SERPL-MCNC: 8.6 MG/DL (ref 8.5–10.5)
CHLORIDE SERPL-SCNC: 109 MMOL/L (ref 96–112)
CO2 SERPL-SCNC: 19 MMOL/L (ref 20–33)
COLOR UR: YELLOW
CREAT SERPL-MCNC: 0.61 MG/DL (ref 0.5–1.4)
CULTURE IF INDICATED INDCX: NO UA CULTURE
EOSINOPHIL # BLD AUTO: 0.02 K/UL (ref 0–0.51)
EOSINOPHIL NFR BLD: 0.1 % (ref 0–6.9)
ERYTHROCYTE [DISTWIDTH] IN BLOOD BY AUTOMATED COUNT: 49.7 FL (ref 35.9–50)
GFR SERPL CREATININE-BSD FRML MDRD: >60 ML/MIN/1.73 M 2
GLOBULIN SER CALC-MCNC: 3 G/DL (ref 1.9–3.5)
GLUCOSE SERPL-MCNC: 125 MG/DL (ref 65–99)
GLUCOSE UR STRIP.AUTO-MCNC: NEGATIVE MG/DL
HCT VFR BLD AUTO: 40.2 % (ref 37–47)
HGB BLD-MCNC: 12.9 G/DL (ref 12–16)
IMM GRANULOCYTES # BLD AUTO: 0.17 K/UL (ref 0–0.11)
IMM GRANULOCYTES NFR BLD AUTO: 0.9 % (ref 0–0.9)
INR PPP: 1.17 (ref 0.87–1.13)
KETONES UR STRIP.AUTO-MCNC: NEGATIVE MG/DL
LEUKOCYTE ESTERASE UR QL STRIP.AUTO: NEGATIVE
LIPASE SERPL-CCNC: 20 U/L (ref 11–82)
LYMPHOCYTES # BLD AUTO: 1.93 K/UL (ref 1–4.8)
LYMPHOCYTES NFR BLD: 10.7 % (ref 22–41)
MCH RBC QN AUTO: 26.9 PG (ref 27–33)
MCHC RBC AUTO-ENTMCNC: 32.1 G/DL (ref 33.6–35)
MCV RBC AUTO: 83.8 FL (ref 81.4–97.8)
MICRO URNS: NORMAL
MONOCYTES # BLD AUTO: 0.73 K/UL (ref 0–0.85)
MONOCYTES NFR BLD AUTO: 4 % (ref 0–13.4)
NEUTROPHILS # BLD AUTO: 15.15 K/UL (ref 2–7.15)
NEUTROPHILS NFR BLD: 84.1 % (ref 44–72)
NITRITE UR QL STRIP.AUTO: NEGATIVE
NRBC # BLD AUTO: 0 K/UL
NRBC BLD AUTO-RTO: 0 /100 WBC
PH UR STRIP.AUTO: 5 [PH]
PLATELET # BLD AUTO: 305 K/UL (ref 164–446)
PMV BLD AUTO: 10.2 FL (ref 9–12.9)
POTASSIUM SERPL-SCNC: 4 MMOL/L (ref 3.6–5.5)
PROT SERPL-MCNC: 6.9 G/DL (ref 6–8.2)
PROT UR QL STRIP: NEGATIVE MG/DL
PROTHROMBIN TIME: 15.3 SEC (ref 12–14.6)
RBC # BLD AUTO: 4.8 M/UL (ref 4.2–5.4)
RBC UR QL AUTO: NEGATIVE
SODIUM SERPL-SCNC: 137 MMOL/L (ref 135–145)
SP GR UR STRIP.AUTO: 1.02
TROPONIN I SERPL-MCNC: <0.01 NG/ML (ref 0–0.04)
WBC # BLD AUTO: 18 K/UL (ref 4.8–10.8)

## 2017-05-21 PROCEDURE — 83690 ASSAY OF LIPASE: CPT

## 2017-05-21 PROCEDURE — 81003 URINALYSIS AUTO W/O SCOPE: CPT

## 2017-05-21 PROCEDURE — 96375 TX/PRO/DX INJ NEW DRUG ADDON: CPT

## 2017-05-21 PROCEDURE — 99285 EMERGENCY DEPT VISIT HI MDM: CPT

## 2017-05-21 PROCEDURE — 80053 COMPREHEN METABOLIC PANEL: CPT

## 2017-05-21 PROCEDURE — 85610 PROTHROMBIN TIME: CPT

## 2017-05-21 PROCEDURE — 83880 ASSAY OF NATRIURETIC PEPTIDE: CPT

## 2017-05-21 PROCEDURE — 36415 COLL VENOUS BLD VENIPUNCTURE: CPT

## 2017-05-21 PROCEDURE — 71010 DX-CHEST-LIMITED (1 VIEW): CPT

## 2017-05-21 PROCEDURE — 84484 ASSAY OF TROPONIN QUANT: CPT

## 2017-05-21 PROCEDURE — 700111 HCHG RX REV CODE 636 W/ 250 OVERRIDE (IP): Performed by: EMERGENCY MEDICINE

## 2017-05-21 PROCEDURE — 85025 COMPLETE CBC W/AUTO DIFF WBC: CPT

## 2017-05-21 PROCEDURE — 96374 THER/PROPH/DIAG INJ IV PUSH: CPT

## 2017-05-21 PROCEDURE — 93005 ELECTROCARDIOGRAM TRACING: CPT | Performed by: EMERGENCY MEDICINE

## 2017-05-21 PROCEDURE — 700105 HCHG RX REV CODE 258: Performed by: EMERGENCY MEDICINE

## 2017-05-21 PROCEDURE — 85730 THROMBOPLASTIN TIME PARTIAL: CPT

## 2017-05-21 RX ORDER — ONDANSETRON 2 MG/ML
4 INJECTION INTRAMUSCULAR; INTRAVENOUS ONCE
Status: COMPLETED | OUTPATIENT
Start: 2017-05-21 | End: 2017-05-21

## 2017-05-21 RX ORDER — MORPHINE SULFATE 4 MG/ML
4 INJECTION, SOLUTION INTRAMUSCULAR; INTRAVENOUS ONCE
Status: COMPLETED | OUTPATIENT
Start: 2017-05-21 | End: 2017-05-21

## 2017-05-21 RX ORDER — PREDNISONE 20 MG/1
40 TABLET ORAL DAILY
Qty: 10 TAB | Refills: 0 | Status: SHIPPED | OUTPATIENT
Start: 2017-05-21 | End: 2017-05-26

## 2017-05-21 RX ORDER — SODIUM CHLORIDE 9 MG/ML
1000 INJECTION, SOLUTION INTRAVENOUS ONCE
Status: COMPLETED | OUTPATIENT
Start: 2017-05-21 | End: 2017-05-21

## 2017-05-21 RX ADMIN — MORPHINE SULFATE 4 MG: 4 INJECTION INTRAVENOUS at 08:25

## 2017-05-21 RX ADMIN — ONDANSETRON 4 MG: 2 INJECTION INTRAMUSCULAR; INTRAVENOUS at 08:25

## 2017-05-21 RX ADMIN — SODIUM CHLORIDE 1000 ML: 9 INJECTION, SOLUTION INTRAVENOUS at 08:25

## 2017-05-21 ASSESSMENT — ENCOUNTER SYMPTOMS
COUGH: 1
VOMITING: 0
ABDOMINAL PAIN: 0
HEADACHES: 0
SHORTNESS OF BREATH: 1
DIZZINESS: 0
NAUSEA: 1
FLANK PAIN: 1
FEVER: 0

## 2017-05-21 NOTE — ED PROVIDER NOTES
"ED Provider Note    Scribed for Lucía Okeefe D.O. by Lety Carrillo. 5/21/2017, 7:46 AM.    Primary care provider: Elenita Friend PA-C  Means of arrival: EMS  History obtained from: Patient  History limited by: None     CHIEF COMPLAINT  Chief Complaint   Patient presents with   • Chest Pain       HPI  Jayashree Carrasco is a 46 y.o. female who presents to the Emergency Department for cough, ingestion, chest pain and right back pain, but the patient's had since Friday. Patient's chest pain worsened this morning which prompted her to come to the emergency department. Patient reports recent history of upper respiratory infection with sore throat, congestion, cough, and dysuria  four days ago. She states she was seen at urgent care and was discharged with Omnicef antibiotic, albuterol breathing treatment, a new inhaler, and Tessalon  four days ago. Chest pain worsens with cough. Cough is nonproductive. She also has a history of asthma.  She used inhaler but denies relief of symptoms. Patient reports history of asthma. Patient reports improved chest pain after baby aspirin en route to hospital with EMS. She states nausea at this time.  Denies fever. Denies history of smoking. Patient reports history of chest pain approximately five year ago with a cardiac workup, she states her \"troponin was off\" and was recommended to take baby aspirin.     REVIEW OF SYSTEMS  Review of Systems   Constitutional: Negative for fever.   Respiratory: Positive for cough and shortness of breath.    Cardiovascular: Positive for chest pain.   Gastrointestinal: Positive for nausea. Negative for vomiting and abdominal pain.   Genitourinary: Positive for dysuria and flank pain.   Neurological: Negative for dizziness and headaches.   All other systems reviewed and are negative.      PAST MEDICAL HISTORY   has a past medical history of Cancer ([facility]); Psychiatric disorder; Fibromyalgia; ASTHMA; GERD (gastroesophageal reflux disease); Chronic low " back pain (4/12/2014); Chronic neck pain (4/12/2014); and Unspecified vitamin D deficiency (4/12/2014).    SURGICAL HISTORY   has past surgical history that includes cholecystectomy; thyroidectomy; other abdominal surgery; and other.    SOCIAL HISTORY  Social History   Substance Use Topics   • Smoking status: Never Smoker    • Smokeless tobacco: Never Used   • Alcohol Use: No      History   Drug Use No       FAMILY HISTORY  Patient did not reports pertinent family history     CURRENT MEDICATIONS  No current facility-administered medications on file prior to encounter.     Current Outpatient Prescriptions on File Prior to Encounter   Medication Sig Dispense Refill   • lidocaine (XYLOCAINE) 4 % Solution Apply 1 mL to affected area(s) as needed (for pain). 1 Bottle 0   • cefdinir (OMNICEF) 300 MG Cap Take 1 Cap by mouth 2 times a day. 20 Cap 0   • lidocaine viscous 2% (XYLOCAINE) 2 % Solution Take 15 mL by mouth 4 times a day as needed for Throat/Mouth Pain for up to 3 days. Gargle and spit out 180 mL 0   • benzonatate (TESSALON) 100 MG Cap Take 1 Cap by mouth 3 times a day as needed for Cough. 60 Cap 0   • albuterol 108 (90 BASE) MCG/ACT Aero Soln inhalation aerosol Inhale 2 Puffs by mouth every 6 hours as needed for Shortness of Breath. 8.5 g 0   • albuterol (PROVENTIL) 2.5mg/3ml Nebu Soln solution for nebulization 3 mL by Nebulization route every four hours as needed for Shortness of Breath. 75 mL 0   • albuterol 108 (90 BASE) MCG/ACT Aero Soln inhalation aerosol Inhale 2 Puffs by mouth every 6 hours as needed for Shortness of Breath. 1 Inhaler 3   • gabapentin (NEURONTIN) 100 MG Cap Take 1 Cap by mouth 2 Times a Day. 90 Cap 0   • levothyroxine (SYNTHROID) 125 MCG Tab Take 1 Tab by mouth Every morning on an empty stomach. As well as PO at least 1 hour after dinner. 60 Tab 3   • omeprazole (PRILOSEC) 40 MG delayed-release capsule Take 1 Cap by mouth 2 times a day. 60 Cap 2   • promethazine (PHENERGAN) 25 MG Tab Take  "1 Tab by mouth as needed for Nausea/Vomiting. 16 Tab 0   • vitamin D, Ergocalciferol, (DRISDOL) 89618 UNITS Cap capsule Take 1 Cap by mouth every 7 days. 12 Cap 0   • oxycodone-acetaminophen (PERCOCET-10)  MG Tab Take 1 Tab by mouth 3 times a day as needed for Severe Pain. 90 Tab 0   • tizanidine (ZANAFLEX) 4 MG TABS Take 2 Tabs by mouth every 8 hours as needed. (Patient taking differently: Take 6 mg by mouth every 8 hours as needed.) 120 Tab 2     ALLERGIES  Allergies   Allergen Reactions   • Cipro Xr Rash   • Sulfa Drugs        PHYSICAL EXAM  VITAL SIGNS: /72 mmHg  Pulse 66  Temp(Src) 36.7 °C (98.1 °F)  Resp 18  Ht 1.626 m (5' 4\")  Wt 104.781 kg (231 lb)  BMI 39.63 kg/m2  SpO2 98%  Vitals reviewed.  Constitutional: Patient is oriented to person, place, and time. Appears well-developed and well-nourished. No distress.    Head: Normocephalic and atraumatic.   Ears: Normal external ears bilaterally.   Mouth/Throat: Oropharynx is clear and moist, no exudates.   Eyes: Conjunctivae are normal. Pupils are equal, round, and reactive to light.   Neck: Normal range of motion. Neck supple.  Cardiovascular: Normal rate, regular rhythm and normal heart sounds. Normal peripheral pulses.  Pulmonary/Chest: Effort normal and breath sounds normal. No respiratory distress, no wheezes, rhonchi, or rales. diffuse chest wall tenderness.   Abdominal: Soft. Bowel sounds are normal. There is no tenderness, rebound or guarding, or peritoneal signs. Right CVA tenderness.  Musculoskeletal: No edema and no tenderness.   Lymphadenopathy: No cervical adenopathy.   Neurological: No focal deficits.   Skin: Skin is warm and dry. No erythema. No pallor.   Psychiatric: Patient has a normal mood and affect.     LABS  Results for orders placed or performed during the hospital encounter of 05/21/17   Troponin   Result Value Ref Range    Troponin I <0.01 0.00 - 0.04 ng/mL   Btype Natriuretic Peptide   Result Value Ref Range    B " Natriuretic Peptide 32 0 - 100 pg/mL   CBC with Differential   Result Value Ref Range    WBC 18.0 (H) 4.8 - 10.8 K/uL    RBC 4.80 4.20 - 5.40 M/uL    Hemoglobin 12.9 12.0 - 16.0 g/dL    Hematocrit 40.2 37.0 - 47.0 %    MCV 83.8 81.4 - 97.8 fL    MCH 26.9 (L) 27.0 - 33.0 pg    MCHC 32.1 (L) 33.6 - 35.0 g/dL    RDW 49.7 35.9 - 50.0 fL    Platelet Count 305 164 - 446 K/uL    MPV 10.2 9.0 - 12.9 fL    Neutrophils-Polys 84.10 (H) 44.00 - 72.00 %    Lymphocytes 10.70 (L) 22.00 - 41.00 %    Monocytes 4.00 0.00 - 13.40 %    Eosinophils 0.10 0.00 - 6.90 %    Basophils 0.20 0.00 - 1.80 %    Immature Granulocytes 0.90 0.00 - 0.90 %    Nucleated RBC 0.00 /100 WBC    Neutrophils (Absolute) 15.15 (H) 2.00 - 7.15 K/uL    Lymphs (Absolute) 1.93 1.00 - 4.80 K/uL    Monos (Absolute) 0.73 0.00 - 0.85 K/uL    Eos (Absolute) 0.02 0.00 - 0.51 K/uL    Baso (Absolute) 0.04 0.00 - 0.12 K/uL    Immature Granulocytes (abs) 0.17 (H) 0.00 - 0.11 K/uL    NRBC (Absolute) 0.00 K/uL   Complete Metabolic Panel (CMP)   Result Value Ref Range    Sodium 137 135 - 145 mmol/L    Potassium 4.0 3.6 - 5.5 mmol/L    Chloride 109 96 - 112 mmol/L    Co2 19 (L) 20 - 33 mmol/L    Anion Gap 9.0 0.0 - 11.9    Glucose 125 (H) 65 - 99 mg/dL    Bun 10 8 - 22 mg/dL    Creatinine 0.61 0.50 - 1.40 mg/dL    Calcium 8.6 8.5 - 10.5 mg/dL    AST(SGOT) 9 (L) 12 - 45 U/L    ALT(SGPT) 8 2 - 50 U/L    Alkaline Phosphatase 66 30 - 99 U/L    Total Bilirubin 0.3 0.1 - 1.5 mg/dL    Albumin 3.9 3.2 - 4.9 g/dL    Total Protein 6.9 6.0 - 8.2 g/dL    Globulin 3.0 1.9 - 3.5 g/dL    A-G Ratio 1.3 g/dL   Prothrombin Time   Result Value Ref Range    PT 15.3 (H) 12.0 - 14.6 sec    INR 1.17 (H) 0.87 - 1.13   APTT   Result Value Ref Range    APTT 29.2 24.7 - 36.0 sec   Lipase   Result Value Ref Range    Lipase 20 11 - 82 U/L   ESTIMATED GFR   Result Value Ref Range    GFR If African American >60 >60 mL/min/1.73 m 2    GFR If Non African American >60 >60 mL/min/1.73 m 2   URINALYSIS,CULTURE IF  INDICATED   Result Value Ref Range    Color Yellow     Character Clear     Specific Gravity 1.019 <1.035    Ph 5.0 5.0-8.0    Glucose Negative Negative mg/dL    Ketones Negative Negative mg/dL    Protein Negative Negative mg/dL    Bilirubin Negative Negative    Nitrite Negative Negative    Leukocyte Esterase Negative Negative    Occult Blood Negative Negative    Micro Urine Req see below     Culture Indicated No UA Culture   EKG (ER)   Result Value Ref Range    Report       [facility] Emergency Dept.    Test Date:  2017  Pt Name:    CORAL ZAMAN                 Department: ER  MRN:        [id number]                      Room:       [facility]  Gender:     F                            Technician: [id number]  :        1970                   Requested By:SERENA DOWLING  Order #:    [id number]                    Reading MD:    Measurements  Intervals                                Axis  Rate:       68                           P:          33  LA:         148                          QRS:        36  QRSD:       86                           T:          30  QT:         400  QTc:        426    Interpretive Statements  SINUS RHYTHM  EARLY PRECORDIAL R/S TRANSITION  Compared to ECG 2016 01:43:16  No significant changes       All labs reviewed by me.    EKG Interpretation  Interpreted by me    Rhythm: normal sinus   Rate: 68  Axis: normal  Ectopy: none  Conduction: normal  ST Segments: no acute change  T Waves: no acute change  Q Waves: none    Clinical Impression: no acute changes from comparison EKG , normal EKG     RADIOLOGY  DX-CHEST-LIMITED (1 VIEW)   Final Result         No acute cardiac or pulmonary abnormality is identified.        The radiologist's interpretation of all radiological studies have been reviewed by me.    COURSE & MEDICAL DECISION MAKING  Pertinent Labs & Imaging studies reviewed. (See chart for details)      7:46 AM - Patient seen and examined at bedside. This patient  presents with cough, congestion, pleuritic type chest pain. She's been taking Omnicef for a few days for what appears to be a upper respiratory infection as well as urinary tract infection. She is also having right flank pain. She's not had a fever. She is not tachycardic, tachypneic or hypoxic. She has no personal history of DVT. She's PERC negative.      Patient will be treated with NS infusion 1,000 mL. Ordered DX-Chest portable, DX-Chest limited, Urinalysis, Troponin, B-Type Natriuretic Peptide, CBC with differential, PTT, CMP, APTT, Lipase, APTT, Estimated GFR, EKG, Oxygen Protocol, (see all lab orders) to evaluate her symptoms. The differential diagnoses include but are not limited to: Bronchitis/ pneumonia, viral syndrome, UTI and pyelonephritis, less likely ACS    11:06 AM Recheck: Patient re-evaluated at beside. Patient reports feeling improved. Urine was unremarkable. No blood or no evidence of infection. I do not think she needs imaging at this time. She continues to have normal vital signs. She satting well on room air. No evidence of pneumonia on x-ray. She does have an elevated white blood cell count which may be resolving from prior illness or viral in its etiology. At this time, the patient safe for discharge to home. I do think she can benefit from a short course of steroids. She's had been given cough medicine. She is given strict return precautions. I suspect, she will continually improve however, if not, she is advised to return for reevaluation. Discussed patient's condition and treatment plan. Patient's lab and radiology results discussed. I informed patient above findings were normal. The patient understood and is in agreement.     she'll be discharged home in stable condition.      FINAL IMPRESSION  1. Bronchitis    2. Pain of anterior chest wall with respiration        PRESCRIPTIONS  Discharge Medication List as of 5/21/2017 11:17 AM      START taking these medications    Details    predniSONE (DELTASONE) 20 MG Tab Take 2 Tabs by mouth every day for 5 days., Disp-10 Tab, R-0, Print Rx Paper             FOLLOW UP  Elenita Friend PA-C  [address]  [facility] [address] [phone]    In 2 days      [facility], Emergency Dept  [address] [facility] [address] [phone]    If symptoms worsen        -DISCHARGE-     ILety (Scribe), am scribing for, and in the presence of, Lucía Okeefe D.O..    Electronically signed by: Lety Carrillo (Danoibdenisha), 5/21/2017    ILucía D.O. personally performed the services described in this documentation, as scribed by Lety Carrillo in my presence, and it is both accurate and complete.    The note accurately reflects work and decisions made by me.  Lucía Okeefe  5/21/2017  12:57 PM

## 2017-05-21 NOTE — ED AVS SNAPSHOT
5/21/2017    Jayashree Carrasco  [address] [facility] [address]    Dear Jayashree:    [facility] wants to ensure your discharge home is safe and you or your loved ones have had all of your questions answered regarding your care after you leave the hospital.    Below is a list of resources and contact information should you have any questions regarding your hospital stay, follow-up instructions, or active medical symptoms.    Questions or Concerns Regarding… Contact   Medical Questions Related to Your Discharge  (7 days a week, 8am-5pm) Contact a Nurse Care Coordinator   [phone]   Medical Questions Not Related to Your Discharge  (24 hours a day / 7 days a week)  Contact the Nurse Health Line   [phone]    Medications or Discharge Instructions Refer to your discharge packet   or contact your [facility] Primary Care Provider   [phone]   Follow-up Appointment(s) Schedule your appointment via "[employer] ([email or web address])"   or contact Scheduling [phone]   Billing Review your statement via "[email or web address])"  or contact Billing [phone]   Medical Records Review your records via "[email or web address])"   or contact Medical Records [phone]     You may receive a telephone call within two days of discharge. This call is to make certain you understand your discharge instructions and have the opportunity to have any questions answered. You can also easily access your medical information, test results and upcoming appointments via the "[email or web address] free online health management tool. You can learn more and sign up at [employer]/"[employer] ([email or web address])". For assistance setting up your "[email or web address] account, please call [phone].    Once again, we want to ensure your discharge home is safe and that you have a clear understanding of any next steps in your care. If you have any questions or concerns, please do not hesitate to contact us, we are here for you. Thank you for choosing [facility] for your healthcare needs.    Sincerely,    Your [facility] Healthcare Team

## 2017-05-21 NOTE — ED AVS SNAPSHOT
[employer] Access Code: [email or web address]  Expires: 5/27/2017  4:37 PM    Your email address is not on file at [facility].  Email Addresses are required for you to sign up for [employer], please contact [phone] to verify your personal information and to provide your email address prior to attempting to register for [employer].    Jayashree Carrasco  [address] [place] [address], [place] [address]    [employer]  A secure, online tool to manage your health information     [facility]’s [employer]® is a secure, online tool that connects you to your personalized health information from the privacy of your home -- day or night - making it very easy for you to manage your healthcare. Once the activation process is completed, you can even access your medical information using the [employer] jasmeet, which is available for free in the Apple Jasmeet store or Google Play store.     To learn more about [employer], visit [email or web address]    There are two levels of access available (as shown below):   My Chart Features  [facility] Primary Care Doctor [facility]  Specialists [facility]  Urgent  Care Non-[facility] Primary Care Doctor   Email your healthcare team securely and privately 24/7 X X X    Manage appointments: schedule your next appointment; view details of past/upcoming appointments X      Request prescription refills. X      View recent personal medical records, including lab and immunizations X X X X   View health record, including health history, allergies, medications X X X X   Read reports about your outpatient visits, procedures, consult and ER notes X X X X   See your discharge summary, which is a recap of your hospital and/or ER visit that includes your diagnosis, lab results, and care plan X X  X     How to register for [employer]:  Once your e-mail address has been verified, follow the following steps to sign up for [employer].     1. Go to  [email or web address]  2. Click on the Sign Up Now box, which takes you to the New Member Sign Up page. You  will need to provide the following information:  a. Enter your [employer] Access Code exactly as it appears at the top of this page. (You will not need to use this code after you’ve completed the sign-up process. If you do not sign up before the expiration date, you must request a new code.)   b. Enter your date of birth.   c. Enter your home email address.   d. Click Submit, and follow the next screen’s instructions.  3. Create a [employer] ID. This will be your [employer]inate login ID and cannot be changed, so think of one that is secure and easy to remember.  4. Create a [employer] password. You can change your password at any time.  5. Enter your Password Reset Question and Answer. This can be used at a later time if you forget your password.   6. Enter your e-mail address. This allows you to receive e-mail notifications when new information is available in [employer].  7. Click Sign Up. You can now view your health information.    For assistance activating your [employer] account, call [phone]

## 2017-05-21 NOTE — DISCHARGE INSTRUCTIONS
Bronchitis  Bronchitis is the body's way of reacting to injury and/or infection (inflammation) of the bronchi. Bronchi are the air tubes that extend from the windpipe into the lungs. If the inflammation becomes severe, it may cause shortness of breath.  CAUSES   Inflammation may be caused by:  · A virus.  · Germs (bacteria).  · Dust.  · Allergens.  · Pollutants and many other irritants.  The cells lining the bronchial tree are covered with tiny hairs (cilia). These constantly beat upward, away from the lungs, toward the mouth. This keeps the lungs free of pollutants. When these cells become too irritated and are unable to do their job, mucus begins to develop. This causes the characteristic cough of bronchitis. The cough clears the lungs when the cilia are unable to do their job. Without either of these protective mechanisms, the mucus would settle in the lungs. Then you would develop pneumonia.  Smoking is a common cause of bronchitis and can contribute to pneumonia. Stopping this habit is the single most important thing you can do to help yourself.  TREATMENT   · Your caregiver may prescribe an antibiotic if the cough is caused by bacteria. Also, medicines that open up your airways make it easier to breathe. Your caregiver may also recommend or prescribe an expectorant. It will loosen the mucus to be coughed up. Only take over-the-counter or prescription medicines for pain, discomfort, or fever as directed by your caregiver.  · Removing whatever causes the problem (smoking, for example) is critical to preventing the problem from getting worse.  · Cough suppressants may be prescribed for relief of cough symptoms.  · Inhaled medicines may be prescribed to help with symptoms now and to help prevent problems from returning.  · For those with recurrent (chronic) bronchitis, there may be a need for steroid medicines.  SEEK IMMEDIATE MEDICAL CARE IF:   · During treatment, you develop more pus-like mucus (purulent  sputum).  · You have a fever.  · Your baby is older than 3 months with a rectal temperature of 102° F (38.9° C) or higher.  · Your baby is 3 months old or younger with a rectal temperature of 100.4° F (38° C) or higher.  · You become progressively more ill.  · You have increased difficulty breathing, wheezing, or shortness of breath.  It is necessary to seek immediate medical care if you are elderly or sick from any other disease.  MAKE SURE YOU:   · Understand these instructions.  · Will watch your condition.  · Will get help right away if you are not doing well or get worse.  Document Released: 12/18/2006 Document Revised: 03/11/2013 Document Reviewed: 10/27/2009  [employer]® Patient Information ©2014 [employer].    Chest Wall Pain  Chest wall pain is pain in or around the bones and muscles of your chest. It may take up to 6 weeks to get better. It may take longer if you must stay physically active in your work and activities.   CAUSES   Chest wall pain may happen on its own. However, it may be caused by:  · A viral illness like the flu.  · Injury.  · Coughing.  · Exercise.  · Arthritis.  · Fibromyalgia.  · Shingles.  HOME CARE INSTRUCTIONS   · Avoid overtiring physical activity. Try not to strain or perform activities that cause pain. This includes any activities using your chest or your abdominal and side muscles, especially if heavy weights are used.  · Put ice on the sore area.  ¨ Put ice in a plastic bag.  ¨ Place a towel between your skin and the bag.  ¨ Leave the ice on for 15-20 minutes per hour while awake for the first 2 days.  · Only take over-the-counter or prescription medicines for pain, discomfort, or fever as directed by your caregiver.  SEEK IMMEDIATE MEDICAL CARE IF:   · Your pain increases, or you are very uncomfortable.  · You have a fever.  · Your chest pain becomes worse.  · You have new, unexplained symptoms.  · You have nausea or vomiting.  · You feel sweaty or lightheaded.  · You have a  cough with phlegm (sputum), or you cough up blood.  MAKE SURE YOU:   · Understand these instructions.  · Will watch your condition.  · Will get help right away if you are not doing well or get worse.     This information is not intended to replace advice given to you by your health care provider. Make sure you discuss any questions you have with your health care provider.     Document Released: 12/18/2006 Document Revised: 03/11/2013 Document Reviewed: 03/14/2016  [employer] Interactive Patient Education ©2016 [employer]

## 2017-05-21 NOTE — ED NOTES
Pt arrived ems with c/o cp and right neck pain , back pain and productive cough per pt. Pt hooked to monitor, bp and spo2. alalrms on and audible. Chart up for erp

## 2017-05-21 NOTE — ED AVS SNAPSHOT
Home Care Instructions                                                                                                                Jayashree Carrasco   MRN: [id number]    Department:  [facility], Emergency Dept   Date of Visit:  5/21/2017            [facility], Emergency Dept    [address] [place] [id number]    Phone:  [phone]      You were seen by     Lucía Okeefe D.O.      Your Diagnosis Was     Bronchitis     J40       These are the medications you received during your hospitalization from 05/21/2017 0718 to 05/21/2017 1117     Date/Time Order Dose Route Action    05/21/2017 0825 NS infusion 1,000 mL 1,000 mL Intravenous New Bag    05/21/2017 0825 ondansetron (ZOFRAN) syringe/vial injection 4 mg 4 mg Intravenous Given    05/21/2017 0825 morphine (pf) 4 mg/ml injection 4 mg 4 mg Intravenous Given      Follow-up Information     1. Follow up with Elenita Friend PA-C In 2 days.    Specialty:  Family Medicine    Contact information    [facility]  [facility] [address] [phone]          2. Follow up with [facility], Emergency Dept.    Specialty:  Emergency Medicine    Why:  If symptoms worsen    Contact information    [address] [place] [id number] [phone]      Medication Information     Review all of your home medications and newly ordered medications with your primary doctor and/or pharmacist as soon as possible. Follow medication instructions as directed by your doctor and/or pharmacist.     Please keep your complete medication list with you and share with your physician. Update the information when medications are discontinued, doses are changed, or new medications (including over-the-counter products) are added; and carry medication information at all times in the event of emergency situations.               Medication List      START taking these medications        Instructions    Morning  Afternoon Evening Bedtime    predniSONE 20 MG Tabs   Commonly known as:  DELTASONE        Take 2 Tabs by mouth every day for 5 days.   Dose:  40 mg                          ASK your doctor about these medications        Instructions    Morning Afternoon Evening Bedtime    * albuterol 108 (90 BASE) MCG/ACT Aers inhalation aerosol        Inhale 2 Puffs by mouth every 6 hours as needed for Shortness of Breath.   Dose:  2 Puff                        * albuterol 108 (90 BASE) MCG/ACT Aers inhalation aerosol        Inhale 2 Puffs by mouth every 6 hours as needed for Shortness of Breath.   Dose:  2 Puff                        * albuterol 2.5mg/3ml Nebu solution for nebulization   Commonly known as:  PROVENTIL        3 mL by Nebulization route every four hours as needed for Shortness of Breath.   Dose:  2.5 mg                        benzonatate 100 MG Caps   Commonly known as:  TESSALON        Take 1 Cap by mouth 3 times a day as needed for Cough.   Dose:  100 mg                        cefdinir 300 MG Caps   Commonly known as:  OMNICEF        Take 1 Cap by mouth 2 times a day.   Dose:  300 mg                        gabapentin 100 MG Caps   Commonly known as:  NEURONTIN        Take 1 Cap by mouth 2 Times a Day.   Dose:  100 mg                        levothyroxine 125 MCG Tabs   Commonly known as:  SYNTHROID        Take 1 Tab by mouth Every morning on an empty stomach. As well as PO at least 1 hour after dinner.   Dose:  125 mcg                        lidocaine 4 % Soln   Commonly known as:  XYLOCAINE        Apply 1 mL to affected area(s) as needed (for pain).   Dose:  1 mL                        lidocaine viscous 2% 2 % Soln   Commonly known as:  XYLOCAINE        Take 15 mL by mouth 4 times a day as needed for Throat/Mouth Pain for up to 3 days. Gargle and spit out   Dose:  15 mL                        omeprazole 40 MG delayed-release capsule   Commonly known as:  PRILOSEC        Take 1 Cap by mouth 2 times a day.   Dose:   40 mg                        oxycodone-acetaminophen  MG Tabs   Commonly known as:  PERCOCET-10        Doctor's comments:  Do not fill until 5/4/2017   Take 1 Tab by mouth 3 times a day as needed for Severe Pain.   Dose:  1 Tab                        promethazine 25 MG Tabs   Commonly known as:  PHENERGAN        Take 1 Tab by mouth as needed for Nausea/Vomiting.   Dose:  25 mg                        tizanidine 4 MG Tabs   Commonly known as:  ZANAFLEX        Take 2 Tabs by mouth every 8 hours as needed.   Dose:  8 mg                        vitamin D (Ergocalciferol) 47699 UNITS Caps capsule   Commonly known as:  DRISDOL        Take 1 Cap by mouth every 7 days.   Dose:  57967 Units                        * Notice:  This list has 3 medication(s) that are the same as other medications prescribed for you. Read the directions carefully, and ask your doctor or other care provider to review them with you.         Where to Get Your Medications      You can get these medications from any pharmacy     Bring a paper prescription for each of these medications    - predniSONE 20 MG Tabs            Procedures and tests performed during your visit     APTT    Btype Natriuretic Peptide    CBC with Differential    Complete Metabolic Panel (CMP)    DX-CHEST-LIMITED (1 VIEW)    EKG (ER)    ESTIMATED GFR    Lipase    Oxygen Therapy per Protocol    Prothrombin Time    Troponin    URINALYSIS,CULTURE IF INDICATED        Discharge Instructions       Bronchitis  Bronchitis is the body's way of reacting to injury and/or infection (inflammation) of the bronchi. Bronchi are the air tubes that extend from the windpipe into the lungs. If the inflammation becomes severe, it may cause shortness of breath.  CAUSES   Inflammation may be caused by:  · A virus.  · Germs (bacteria).  · Dust.  · Allergens.  · Pollutants and many other irritants.  The cells lining the bronchial tree are covered with tiny hairs (cilia). These constantly beat upward,  away from the lungs, toward the mouth. This keeps the lungs free of pollutants. When these cells become too irritated and are unable to do their job, mucus begins to develop. This causes the characteristic cough of bronchitis. The cough clears the lungs when the cilia are unable to do their job. Without either of these protective mechanisms, the mucus would settle in the lungs. Then you would develop pneumonia.  Smoking is a common cause of bronchitis and can contribute to pneumonia. Stopping this habit is the single most important thing you can do to help yourself.  TREATMENT   · Your caregiver may prescribe an antibiotic if the cough is caused by bacteria. Also, medicines that open up your airways make it easier to breathe. Your caregiver may also recommend or prescribe an expectorant. It will loosen the mucus to be coughed up. Only take over-the-counter or prescription medicines for pain, discomfort, or fever as directed by your caregiver.  · Removing whatever causes the problem (smoking, for example) is critical to preventing the problem from getting worse.  · Cough suppressants may be prescribed for relief of cough symptoms.  · Inhaled medicines may be prescribed to help with symptoms now and to help prevent problems from returning.  · For those with recurrent (chronic) bronchitis, there may be a need for steroid medicines.  SEEK IMMEDIATE MEDICAL CARE IF:   · During treatment, you develop more pus-like mucus (purulent sputum).  · You have a fever.  · Your baby is older than 3 months with a rectal temperature of 102° F (38.9° C) or higher.  · Your baby is 3 months old or younger with a rectal temperature of 100.4° F (38° C) or higher.  · You become progressively more ill.  · You have increased difficulty breathing, wheezing, or shortness of breath.  It is necessary to seek immediate medical care if you are elderly or sick from any other disease.  MAKE SURE YOU:   · Understand these instructions.  · Will watch  your condition.  · Will get help right away if you are not doing well or get worse.  Document Released: 12/18/2006 Document Revised: 03/11/2013 Document Reviewed: 10/27/2009  [employer]® Patient Information ©2014 [email or web address].    Chest Wall Pain  Chest wall pain is pain in or around the bones and muscles of your chest. It may take up to 6 weeks to get better. It may take longer if you must stay physically active in your work and activities.   CAUSES   Chest wall pain may happen on its own. However, it may be caused by:  · A viral illness like the flu.  · Injury.  · Coughing.  · Exercise.  · Arthritis.  · Fibromyalgia.  · Shingles.  HOME CARE INSTRUCTIONS   · Avoid overtiring physical activity. Try not to strain or perform activities that cause pain. This includes any activities using your chest or your abdominal and side muscles, especially if heavy weights are used.  · Put ice on the sore area.  ¨ Put ice in a plastic bag.  ¨ Place a towel between your skin and the bag.  ¨ Leave the ice on for 15-20 minutes per hour while awake for the first 2 days.  · Only take over-the-counter or prescription medicines for pain, discomfort, or fever as directed by your caregiver.  SEEK IMMEDIATE MEDICAL CARE IF:   · Your pain increases, or you are very uncomfortable.  · You have a fever.  · Your chest pain becomes worse.  · You have new, unexplained symptoms.  · You have nausea or vomiting.  · You feel sweaty or lightheaded.  · You have a cough with phlegm (sputum), or you cough up blood.  MAKE SURE YOU:   · Understand these instructions.  · Will watch your condition.  · Will get help right away if you are not doing well or get worse.     This information is not intended to replace advice given to you by your health care provider. Make sure you discuss any questions you have with your health care provider.     Document Released: 12/18/2006 Document Revised: 03/11/2013 Document Reviewed: 03/14/2016  [employer] Patient  Education ©2016 [employer]              Patient Information     Patient Information    Following emergency treatment: all patient requiring follow-up care must return either to a private physician or a clinic if your condition worsens before you are able to obtain further medical attention, please return to the emergency room.     Billing Information    At [facility], we work to make the billing process streamlined for our patients.  Our Representatives are here to answer any questions you may have regarding your hospital bill.  If you have insurance coverage and have supplied your insurance information to us, we will submit a claim to your insurer on your behalf.  Should you have any questions regarding your bill, we can be reached online or by phone as follows:  Online: You are able pay your bills online or live chat with our representatives about any billing questions you may have. We are here to help Monday - Friday from 8:00am to 7:30pm and 9:00am - 12:00pm on Saturdays.  Please visit [email or web address]  for more information.   Phone:  [phone] or [phone]    Please note that your emergency physician, surgeon, pathologist, radiologist, anesthesiologist, and other specialists are not employed by [facility] and will therefore bill separately for their services.  Please contact them directly for any questions concerning their bills at the numbers below:     Emergency Physician Services:  [phone]  [facility]:  [phone]  [facility]:  [phone]  [facility]:  [phone]    1. Your final bill may vary from the amount quoted upon discharge if all procedures are not complete at that time, or if your doctor has additional procedures of which we are not aware. You will receive an additional bill if you return to the Emergency Department at [facility] for suture removal regardless of the facility of which the  sutures were placed.     2. Please arrange for settlement of this account at the emergency registration.    3. All self-pay accounts are due in full at the time of treatment.  If you are unable to meet this obligation then payment is expected within 4-5 days.     4. If you have had radiology studies (CT, X-ray, Ultrasound, MRI), you have received a preliminary result during your emergency department visit. Please contact the radiology department [phone] to receive a copy of your final result. Please discuss the Final result with your primary physician or with the follow up physician provided.     Crisis Hotline:  [facility] Crisis Hotline:  8-920-WCVZRNA or 1-931.200.8074  [place] Crisis Hotline:    1-897.189.6831 or 079-737-0881         ED Discharge Follow Up Questions    1. In order to provide you with very good care, we would like to follow up with a phone call in the next few days.  May we have your permission to contact you?     YES /  NO    2. What is the best phone number to call you? (       )_____-__________    3. What is the best time to call you?      Morning  /  Afternoon  /  Evening                   Patient Signature:  ____________________________________________________________    Date:  ____________________________________________________________      Your appointments     May 24, 2017  3:00 PM   Established Patient with Elenita Friend PA-C   [facility] (--)    [address] [place] [id number] [phone]           You will be receiving a confirmation call a few days before your appointment from our automated call confirmation system.

## 2017-05-22 ENCOUNTER — PATIENT OUTREACH (OUTPATIENT)
Dept: HEALTH INFORMATION MANAGEMENT | Facility: OTHER | Age: 47
End: 2017-05-22

## 2017-05-22 NOTE — PROGRESS NOTES
· Placed discharge outreach phone call to patient s/p ER discharge 5/21/17.  Left voicemail with my contact information and instructions to return my phone call.

## 2017-05-24 ENCOUNTER — OFFICE VISIT (OUTPATIENT)
Dept: MEDICAL GROUP | Facility: CLINIC | Age: 47
End: 2017-05-24
Payer: MEDICAID

## 2017-05-24 VITALS
DIASTOLIC BLOOD PRESSURE: 82 MMHG | HEART RATE: 84 BPM | TEMPERATURE: 98 F | HEIGHT: 64 IN | BODY MASS INDEX: 41.15 KG/M2 | SYSTOLIC BLOOD PRESSURE: 128 MMHG | WEIGHT: 241 LBS | RESPIRATION RATE: 18 BRPM | OXYGEN SATURATION: 94 %

## 2017-05-24 DIAGNOSIS — Z79.891 CHRONIC USE OF OPIATE FOR THERAPEUTIC PURPOSE: ICD-10-CM

## 2017-05-24 PROCEDURE — 99212 OFFICE O/P EST SF 10 MIN: CPT | Mod: GZ | Performed by: PHYSICIAN ASSISTANT

## 2017-05-24 NOTE — PROGRESS NOTES
"Chief Complaint   Patient presents with   • Medication Refill     percocet refill       HISTORY OF PRESENT ILLNESS: Patient is a 46 y.o. female established patient who presents today for evaluation and management of:    Chronic use of opiate for therapeutic purpose  Patient was first asked if she had presented at her last visit in order to be sure she would not run out of medications. She affirmed that she had not yet run out at our last appointment and was ensuring she would be able to continue taking her prescribed medications. This information was checked against the [facility] and seems to have been consistent. When this information is compared to her negative urine drug screen for hydrocodone and its metabolites the information provided does not match the objective data available. When her inconsistent urine drug screen was discussed she changed her story to reflect that she had stopped taking the medications 2 days before she came in to this clinic to make them last when her prescription was not going to run out for another day. When she was informed that this medication generally stays in urine for 72 hours after her last dose she stated that she was \"spreading out her doses\" so that she would have enough to last which is even less consistent with her negative urine drug screen. When the patient was asked if she had made her appointment with pain management she stated that they had called her and informed her that she would have to wait until September for an appointment so she did not book the appointment. She has made no attempt to see a physical therapist and states that her fibromyalgia is just getting worse. She was, once again, informed that fibromyalgia is not effectively treated with opioid medications. The patient began to raise her voice and bargain when she was informed that since she had already stopped taking her medications prior to her last prescription for norco was written she could no longer be " prescribed this medication as it seems she was not taking it anyways.          Patient Active Problem List    Diagnosis Date Noted   • Abnormal drug screen 05/02/2017   • Chronic use of opiate for therapeutic purpose 04/27/2017   • Chronic low back pain 04/12/2014   • Chronic neck pain 04/12/2014   • Vitamin D deficiency 04/12/2014   • Dysuria 04/12/2014   • Hypothyroidism 06/07/2012   • Fibromyalgia 06/07/2012       Allergies:Cipro xr and Sulfa drugs    Current Outpatient Prescriptions   Medication Sig Dispense Refill   • predniSONE (DELTASONE) 20 MG Tab Take 2 Tabs by mouth every day for 5 days. 10 Tab 0   • cefdinir (OMNICEF) 300 MG Cap Take 1 Cap by mouth 2 times a day. 20 Cap 0   • benzonatate (TESSALON) 100 MG Cap Take 1 Cap by mouth 3 times a day as needed for Cough. 60 Cap 0   • albuterol 108 (90 BASE) MCG/ACT Aero Soln inhalation aerosol Inhale 2 Puffs by mouth every 6 hours as needed for Shortness of Breath. 8.5 g 0   • albuterol (PROVENTIL) 2.5mg/3ml Nebu Soln solution for nebulization 3 mL by Nebulization route every four hours as needed for Shortness of Breath. 75 mL 0   • albuterol 108 (90 BASE) MCG/ACT Aero Soln inhalation aerosol Inhale 2 Puffs by mouth every 6 hours as needed for Shortness of Breath. 1 Inhaler 3   • gabapentin (NEURONTIN) 100 MG Cap Take 1 Cap by mouth 2 Times a Day. 90 Cap 0   • levothyroxine (SYNTHROID) 125 MCG Tab Take 1 Tab by mouth Every morning on an empty stomach. As well as PO at least 1 hour after dinner. 60 Tab 3   • omeprazole (PRILOSEC) 40 MG delayed-release capsule Take 1 Cap by mouth 2 times a day. 60 Cap 2   • promethazine (PHENERGAN) 25 MG Tab Take 1 Tab by mouth as needed for Nausea/Vomiting. 16 Tab 0   • vitamin D, Ergocalciferol, (DRISDOL) 79163 UNITS Cap capsule Take 1 Cap by mouth every 7 days. 12 Cap 0   • oxycodone-acetaminophen (PERCOCET-10)  MG Tab Take 1 Tab by mouth 3 times a day as needed for Severe Pain. 90 Tab 0   • lidocaine (XYLOCAINE) 4 %  "Solution Apply 1 mL to affected area(s) as needed (for pain). 1 Bottle 0   • tizanidine (ZANAFLEX) 4 MG TABS Take 2 Tabs by mouth every 8 hours as needed. (Patient taking differently: Take 6 mg by mouth every 8 hours as needed.) 120 Tab 2     No current facility-administered medications for this visit.       Social History   Substance Use Topics   • Smoking status: Never Smoker    • Smokeless tobacco: Never Used   • Alcohol Use: No       No family status information on file.   History reviewed. No pertinent family history.    Review of Systems:   See HPI above    Exam:  Blood pressure 128/82, pulse 84, temperature 36.7 °C (98 °F), resp. rate 18, height 1.638 m (5' 4.49\"), weight 109.317 kg (241 lb), SpO2 94 %.  Body mass index is 40.74 kg/(m^2).  General:  Overweight female in NAD  Head: is grossly normal.  Neck: Thyroid is not visibly enlarged.  Pulmonary: Normal effort.   Extremities: no clubbing, cyanosis, or edema.  Behavoiral: agitated. See HPI above.     Medical decision-making and discussion:  1. Chronic use of opiate for therapeutic purpose  Discontinued use. Patient should not be prescribed controlled substances any longer due to negative urine drug screen. See HPI above.      Please note that this dictation was created using voice recognition software. I have made every reasonable attempt to correct obvious errors, but I expect that there are errors of grammar and possibly content that I did not discover before finalizing the note.    Return for acute symptoms or for non-controlled substance related visits.   "

## 2017-05-24 NOTE — MR AVS SNAPSHOT
"        Jayashree Carrasco   2017 2:20 PM   Office Visit   MRN: [id number]    Department:  [facility] Phone:  [phone]    Description:  Female : 1970   Provider:  Elenita Friend PA-C           Reason for Visit     Medication Refill percocet refill      Allergies as of 2017     Allergen Noted Reactions    Cipro Xr 2010   Rash    Sulfa Drugs 2008         You were diagnosed with     Chronic use of opiate for therapeutic purpose   [5373713]         Vital Signs     Blood Pressure Pulse Temperature Respirations Height Weight    128/82 mmHg 84 36.7 °C (98 °F) 18 1.638 m (5' 4.49\") 109.317 kg (241 lb)    Body Mass Index Oxygen Saturation Smoking Status             40.74 kg/m2 94% Never Smoker          Basic Information     Date Of Birth Sex Race Ethnicity Preferred Language    1970 Female White Non- English      Problem List              ICD-10-CM Priority Class Noted - Resolved    Hypothyroidism E03.9   2012 - Present    Fibromyalgia M79.7   2012 - Present    Chronic low back pain M54.5, G89.29   2014 - Present    Chronic neck pain M54.2, G89.29   2014 - Present    Vitamin D deficiency E55.9   2014 - Present    Dysuria R30.0   2014 - Present    Chronic use of opiate for therapeutic purpose Z79.891   2017 - Present    Abnormal drug screen R89.2   2017 - Present      Health Maintenance        Date Due Completion Dates    IMM DTaP/Tdap/Td Vaccine (1 - Tdap) 1989 ---    PAP SMEAR 1991 ---    MAMMOGRAM 2010 3/17/2009, 3/17/2009            Current Immunizations     No immunizations on file.      Below and/or attached are the medications your provider expects you to take. Review all of your home medications and newly ordered medications with your provider and/or pharmacist. Follow medication instructions as directed by your provider and/or pharmacist. Please keep your medication list with you and share with your " provider. Update the information when medications are discontinued, doses are changed, or new medications (including over-the-counter products) are added; and carry medication information at all times in the event of emergency situations     Allergies:  CIPRO XR - Rash     SULFA DRUGS - (reactions not documented)               Medications  Valid as of: May 24, 2017 -  3:23 PM    Generic Name Brand Name Tablet Size Instructions for use    Albuterol Sulfate (Aero Soln) albuterol 108 (90 BASE) MCG/ACT Inhale 2 Puffs by mouth every 6 hours as needed for Shortness of Breath.        Albuterol Sulfate (Aero Soln) albuterol 108 (90 BASE) MCG/ACT Inhale 2 Puffs by mouth every 6 hours as needed for Shortness of Breath.        Albuterol Sulfate (Nebu Soln) PROVENTIL 2.5mg/3ml 3 mL by Nebulization route every four hours as needed for Shortness of Breath.        Benzonatate (Cap) TESSALON 100 MG Take 1 Cap by mouth 3 times a day as needed for Cough.        Cefdinir (Cap) OMNICEF 300 MG Take 1 Cap by mouth 2 times a day.        Ergocalciferol (Cap) DRISDOL 63581 UNITS Take 1 Cap by mouth every 7 days.        Gabapentin (Cap) NEURONTIN 100 MG Take 1 Cap by mouth 2 Times a Day.        Levothyroxine Sodium (Tab) SYNTHROID 125 MCG Take 1 Tab by mouth Every morning on an empty stomach. As well as PO at least 1 hour after dinner.        Lidocaine HCl (Solution) XYLOCAINE 4 % Apply 1 mL to affected area(s) as needed (for pain).        Omeprazole (CAPSULE DELAYED RELEASE) PRILOSEC 40 MG Take 1 Cap by mouth 2 times a day.        Oxycodone-Acetaminophen (Tab) PERCOCET-10  MG Take 1 Tab by mouth 3 times a day as needed for Severe Pain.        PredniSONE (Tab) DELTASONE 20 MG Take 2 Tabs by mouth every day for 5 days.        Promethazine HCl (Tab) PHENERGAN 25 MG Take 1 Tab by mouth as needed for Nausea/Vomiting.        TiZANidine HCl (Tab) ZANAFLEX 4 MG Take 2 Tabs by mouth every 8 hours as needed.        .                 Medicines  prescribed today were sent to:     [facility] - [place], [place] - [address]    [address] [place] [address]    Phone: [phone] Fax: [phone]    Open 24 Hours?: No      Medication refill instructions:       If your prescription bottle indicates you have medication refills left, it is not necessary to call your provider’s office. Please contact your pharmacy and they will refill your medication.    If your prescription bottle indicates you do not have any refills left, you may request refills at any time through one of the following ways: The online [employer] system (except Urgent Care), by calling your provider’s office, or by asking your pharmacy to contact your provider’s office with a refill request. Medication refills are processed only during regular business hours and may not be available until the next business day. Your provider may request additional information or to have a follow-up visit with you prior to refilling your medication.   *Please Note: Medication refills are assigned a new Rx number when refilled electronically. Your pharmacy may indicate that no refills were authorized even though a new prescription for the same medication is available at the pharmacy. Please request the medicine by name with the pharmacy before contacting your provider for a refill.           [employer] Access Code: [email or web address]  Expires: 5/27/2017  4:37 PM    [employer]  A secure, online tool to manage your health information     [employer]’s [employer]® is a secure, online tool that connects you to your personalized health information from the privacy of your home -- day or night - making it very easy for you to manage your healthcare. Once the activation process is completed, you can even access your medical information using the [employer] jasmeet, which is available for free in the Apple Jasmeet store or Google Play store.     [employer] provides the following levels of access (as shown  below):   My Chart Features   [facility] Doctor [facility]  Urgent  Care [facility]  Primary Care  Doctor   Email your healthcare team securely and privately 24/7 X X X    Manage appointments: schedule your next appointment; view details of past/upcoming appointments X      Request prescription refills. X      View recent personal medical records, including lab and immunizations X X X X   View health record, including health history, allergies, medications X X X X   Read reports about your outpatient visits, procedures, consult and ER notes X X X X   See your discharge summary, which is a recap of your hospital and/or ER visit that includes your diagnosis, lab results, and care plan. X X       How to register for [employer]:  1. Go to  [email or web address].  2. Click on the Sign Up Now box, which takes you to the New Member Sign Up page. You will need to provide the following information:  a. Enter your [employer] Access Code exactly as it appears at the top of this page. (You will not need to use this code after you’ve completed the sign-up process. If you do not sign up before the expiration date, you must request a new code.)   b. Enter your date of birth.   c. Enter your home email address.   d. Click Submit, and follow the next screen’s instructions.  3. Create a [employer]inelayer ID. This will be your [employer] login ID and cannot be changed, so think of one that is secure and easy to remember.  4. Create a [employer] password. You can change your password at any time.  5. Enter your Password Reset Question and Answer. This can be used at a later time if you forget your password.   6. Enter your e-mail address. This allows you to receive e-mail notifications when new information is available in [employer].  7. Click Sign Up. You can now view your health information.    For assistance activating your [employer] account, call [phone]

## 2017-05-24 NOTE — ASSESSMENT & PLAN NOTE
"Patient was first asked if she had presented at her last visit in order to be sure she would not run out of medications. She affirmed that she had not yet run out at our last appointment and was ensuring she would be able to continue taking her prescribed medications. This information was checked against the [place]  and seems to have been consistent. When this information is compared to her negative urine drug screen for hydrocodone and its metabolites the information provided does not match the objective data available. When her inconsistent urine drug screen was discussed she changed her story to reflect that she had stopped taking the medications 2 days before she came in to this clinic to make them last when her prescription was not going to run out for another day. When she was informed that this medication generally stays in urine for 72 hours after her last dose she stated that she was \"spreading out her doses\" so that she would have enough to last which is even less consistent with her negative urine drug screen. When the patient was asked if she had made her appointment with pain management she stated that they had called her and informed her that she would have to wait until September for an appointment so she did not book the appointment. She has made no attempt to see a physical therapist and states that her fibromyalgia is just getting worse. She was, once again, informed that fibromyalgia is not effectively treated with opioid medications. The patient began to raise her voice and bargain when she was informed that since she had already stopped taking her medications prior to her last prescription for norco was written she could no longer be prescribed this medication as it seems she was not taking it anyways.   "

## 2017-05-30 ENCOUNTER — OFFICE VISIT (OUTPATIENT)
Dept: MEDICAL GROUP | Facility: MEDICAL CENTER | Age: 47
End: 2017-05-30
Attending: NURSE PRACTITIONER
Payer: MEDICAID

## 2017-05-30 VITALS
RESPIRATION RATE: 16 BRPM | SYSTOLIC BLOOD PRESSURE: 118 MMHG | DIASTOLIC BLOOD PRESSURE: 70 MMHG | WEIGHT: 233 LBS | HEART RATE: 80 BPM | HEIGHT: 64 IN | TEMPERATURE: 97.9 F | OXYGEN SATURATION: 99 % | BODY MASS INDEX: 39.78 KG/M2

## 2017-05-30 DIAGNOSIS — M54.2 CHRONIC NECK PAIN: ICD-10-CM

## 2017-05-30 DIAGNOSIS — E66.9 OBESITY (BMI 35.0-39.9 WITHOUT COMORBIDITY): ICD-10-CM

## 2017-05-30 DIAGNOSIS — Z85.850 HISTORY OF PAPILLARY ADENOCARCINOMA OF THYROID: ICD-10-CM

## 2017-05-30 DIAGNOSIS — E89.0 POSTOPERATIVE HYPOTHYROIDISM: ICD-10-CM

## 2017-05-30 DIAGNOSIS — G89.29 CHRONIC NECK PAIN: ICD-10-CM

## 2017-05-30 DIAGNOSIS — J45.20 MILD INTERMITTENT ASTHMA WITHOUT COMPLICATION: ICD-10-CM

## 2017-05-30 DIAGNOSIS — J30.2 SEASONAL ALLERGIC RHINITIS, UNSPECIFIED ALLERGIC RHINITIS TRIGGER: ICD-10-CM

## 2017-05-30 DIAGNOSIS — R89.2 ABNORMAL DRUG SCREEN: ICD-10-CM

## 2017-05-30 DIAGNOSIS — G89.29 CHRONIC BILATERAL LOW BACK PAIN WITHOUT SCIATICA: ICD-10-CM

## 2017-05-30 DIAGNOSIS — M79.7 FIBROMYALGIA: ICD-10-CM

## 2017-05-30 DIAGNOSIS — M54.50 CHRONIC BILATERAL LOW BACK PAIN WITHOUT SCIATICA: ICD-10-CM

## 2017-05-30 DIAGNOSIS — Z12.31 SCREENING MAMMOGRAM, ENCOUNTER FOR: ICD-10-CM

## 2017-05-30 DIAGNOSIS — K21.9 GASTROESOPHAGEAL REFLUX DISEASE, ESOPHAGITIS PRESENCE NOT SPECIFIED: ICD-10-CM

## 2017-05-30 DIAGNOSIS — E55.9 VITAMIN D DEFICIENCY: ICD-10-CM

## 2017-05-30 DIAGNOSIS — Z23 NEED FOR VACCINATION: ICD-10-CM

## 2017-05-30 DIAGNOSIS — Z13.220 SCREENING, LIPID: ICD-10-CM

## 2017-05-30 PROBLEM — Z79.891 CHRONIC USE OF OPIATE FOR THERAPEUTIC PURPOSE: Status: RESOLVED | Noted: 2017-04-27 | Resolved: 2017-05-30

## 2017-05-30 LAB — EKG IMPRESSION: NORMAL

## 2017-05-30 PROCEDURE — 99214 OFFICE O/P EST MOD 30 MIN: CPT | Mod: 25 | Performed by: INTERNAL MEDICINE

## 2017-05-30 PROCEDURE — 700111 HCHG RX REV CODE 636 W/ 250 OVERRIDE (IP): Performed by: INTERNAL MEDICINE

## 2017-05-30 PROCEDURE — 90715 TDAP VACCINE 7 YRS/> IM: CPT | Performed by: INTERNAL MEDICINE

## 2017-05-30 PROCEDURE — 90471 IMMUNIZATION ADMIN: CPT | Performed by: INTERNAL MEDICINE

## 2017-05-30 PROCEDURE — 99204 OFFICE O/P NEW MOD 45 MIN: CPT | Mod: 25 | Performed by: INTERNAL MEDICINE

## 2017-05-30 RX ORDER — PANTOPRAZOLE SODIUM 40 MG/1
40 TABLET, DELAYED RELEASE ORAL 2 TIMES DAILY
Qty: 60 TAB | Refills: 3 | Status: SHIPPED | OUTPATIENT
Start: 2017-05-30 | End: 2018-01-30 | Stop reason: SDUPTHER

## 2017-05-30 RX ORDER — TIZANIDINE 4 MG/1
6 TABLET ORAL 2 TIMES DAILY PRN
Qty: 90 TAB | Refills: 3 | Status: SHIPPED | OUTPATIENT
Start: 2017-05-30 | End: 2018-10-18

## 2017-05-30 RX ORDER — ALBUTEROL SULFATE 2.5 MG/3ML
2.5 SOLUTION RESPIRATORY (INHALATION) EVERY 4 HOURS PRN
Qty: 75 ML | Refills: 2 | Status: SHIPPED | OUTPATIENT
Start: 2017-05-30 | End: 2018-01-30 | Stop reason: SDUPTHER

## 2017-05-30 RX ORDER — TRIAMCINOLONE ACETONIDE 40 MG/ML
40 INJECTION, SUSPENSION INTRA-ARTICULAR; INTRAMUSCULAR ONCE
Status: COMPLETED | OUTPATIENT
Start: 2017-05-30 | End: 2017-05-30

## 2017-05-30 RX ORDER — MONTELUKAST SODIUM 10 MG/1
10 TABLET ORAL DAILY
Qty: 30 TAB | Refills: 1 | Status: SHIPPED | OUTPATIENT
Start: 2017-05-30 | End: 2017-09-08

## 2017-05-30 RX ADMIN — TRIAMCINOLONE ACETONIDE 40 MG: 40 INJECTION, SUSPENSION INTRA-ARTICULAR; INTRAMUSCULAR at 14:38

## 2017-05-30 ASSESSMENT — PAIN SCALES - GENERAL: PAINLEVEL: 6=MODERATE PAIN

## 2017-05-30 NOTE — ASSESSMENT & PLAN NOTE
Patient reports being diagnosed with fibromyalgia at least 10 years ago. She states she has tried Cymbalta, Lyrica, and gabapentin but these have all caused her to have chest pain since she was taken off of them. She reports taking Percocet 10 mg 4 times daily which she was getting from a pain management prescriber in [place]. When she moved to [place], she established care with Elenita Friend, and was given a prescription for 90 tablets. She then had an inconsistent during drug screen and was taken off of the medication.

## 2017-05-30 NOTE — MR AVS SNAPSHOT
"        Jayashree Carrasco   2017 1:30 PM   Office Visit   MRN: [id number]    [facility]   Dept Phone:  [phone]    Description:  Female : 1970   Provider:  Rachael Fierro M.D.           Reason for Visit     Establish Care need ref      Allergies as of 2017     Allergen Noted Reactions    Cipro Xr 2010   Rash    Sulfa Drugs 2008         You were diagnosed with     Mild intermittent asthma without complication   [981753]       Vitamin D deficiency   [9279789]       Postoperative hypothyroidism   [541727]       History of papillary adenocarcinoma of thyroid   [100054]       Gastroesophageal reflux disease, esophagitis presence not specified   [8306289]       Chronic bilateral low back pain without sciatica   [4228782]       Fibromyalgia   [869452]       Seasonal allergic rhinitis, unspecified allergic rhinitis trigger   [3466070]       Screening, lipid   [046096]       Need for vaccination   [722236]       Screening mammogram, encounter for   [1339509]       Obesity (BMI 35.0-39.9 without comorbidity) ([facility])   [253521]         Vital Signs     Blood Pressure Pulse Temperature Respirations Height Weight    118/70 mmHg 80 36.6 °C (97.9 °F) 16 1.638 m (5' 4.49\") 105.688 kg (233 lb)    Body Mass Index Oxygen Saturation Breastfeeding? Smoking Status          39.39 kg/m2 99% No Never Smoker         Basic Information     Date Of Birth Sex Race Ethnicity Preferred Language    1970 Female White Non- English      Problem List              ICD-10-CM Priority Class Noted - Resolved    Hypothyroidism E03.9   2012 - Present    Fibromyalgia M79.7   2012 - Present    Chronic low back pain M54.5, G89.29   2014 - Present    Chronic neck pain M54.2, G89.29   2014 - Present    Vitamin D deficiency E55.9   2014 - Present    Abnormal drug screen R89.2   2017 - Present    Mild intermittent asthma without complication J45.20   2017 - Present   " History of papillary adenocarcinoma of thyroid Z85.850   5/30/2017 - Present    GERD (gastroesophageal reflux disease) K21.9   5/30/2017 - Present    Obesity (BMI 35.0-39.9 without comorbidity) ([facility]) E66.9   5/30/2017 - Present      Health Maintenance        Date Due Completion Dates    IMM DTaP/Tdap/Td Vaccine (1 - Tdap) 6/29/1989 ---    PAP SMEAR 6/29/1991 ---    MAMMOGRAM 6/29/2010 3/17/2009, 3/17/2009            Current Immunizations     Tdap Vaccine 5/30/2017      Below and/or attached are the medications your provider expects you to take. Review all of your home medications and newly ordered medications with your provider and/or pharmacist. Follow medication instructions as directed by your provider and/or pharmacist. Please keep your medication list with you and share with your provider. Update the information when medications are discontinued, doses are changed, or new medications (including over-the-counter products) are added; and carry medication information at all times in the event of emergency situations     Allergies:  CIPRO XR - Rash     SULFA DRUGS - (reactions not documented)               Medications  Valid as of: May 30, 2017 -  2:40 PM    Generic Name Brand Name Tablet Size Instructions for use    Albuterol Sulfate (Aero Soln) albuterol 108 (90 BASE) MCG/ACT Inhale 2 Puffs by mouth every 6 hours as needed for Shortness of Breath.        Albuterol Sulfate (Nebu Soln) PROVENTIL 2.5mg/3ml 3 mL by Nebulization route every four hours as needed for Shortness of Breath.        Ergocalciferol (Cap) DRISDOL 08993 UNITS Take 1 Cap by mouth every 7 days.        Levothyroxine Sodium (Tab) SYNTHROID 125 MCG Take 1 Tab by mouth Every morning on an empty stomach. As well as PO at least 1 hour after dinner.        Montelukast Sodium (Tab) SINGULAIR 10 MG Take 1 Tab by mouth every day.        Oxycodone-Acetaminophen (Tab) PERCOCET-10  MG Take 1 Tab by mouth 3 times a day as needed for Severe Pain.         Pantoprazole Sodium (Tablet Delayed Response) PROTONIX 40 MG Take 1 Tab by mouth 2 times a day.        Promethazine HCl (Tab) PHENERGAN 25 MG Take 1 Tab by mouth as needed for Nausea/Vomiting.        TiZANidine HCl (Tab) ZANAFLEX 4 MG Take 1.5 Tabs by mouth 2 times a day as needed.        .                 Medicines prescribed today were sent to:     [facility] - [place], [place] - [address] [facility] [address] [address]    [address] [place] [address]    Phone: [phone] Fax: [phone]    Open 24 Hours?: No      Medication refill instructions:       If your prescription bottle indicates you have medication refills left, it is not necessary to call your provider’s office. Please contact your pharmacy and they will refill your medication.    If your prescription bottle indicates you do not have any refills left, you may request refills at any time through one of the following ways: The online [employer] system (except Urgent Care), by calling your provider’s office, or by asking your pharmacy to contact your provider’s office with a refill request. Medication refills are processed only during regular business hours and may not be available until the next business day. Your provider may request additional information or to have a follow-up visit with you prior to refilling your medication.   *Please Note: Medication refills are assigned a new Rx number when refilled electronically. Your pharmacy may indicate that no refills were authorized even though a new prescription for the same medication is available at the pharmacy. Please request the medicine by name with the pharmacy before contacting your provider for a refill.        Your To Do List     Future Labs/Procedures Complete By Expires    LIPID PROFILE  As directed 5/31/2018    MA-SCREEN MAMMO W/CAD-BILAT  As directed 5/30/2018    TSH WITH REFLEX TO FT4  As directed 5/30/2018    VITAMIN D,25 HYDROXY  As directed 5/31/2018      Referral     A referral  request has been sent to our patient care coordination department. Please allow 3-5 business days for us to process this request and contact you either by phone or mail. If you do not hear from us by the 5th business day, please call us at [phone].           MyChart Status: Patient Declined

## 2017-05-30 NOTE — ASSESSMENT & PLAN NOTE
Patient reports has had asthma since age 14 which is generally triggered by seasonal allergies. She reports it has been very severe lately especially in light of her recent upper respiratory infection 2 weeks ago. He is been using her rescue albuterol inhaler 4 times a day or more for the past 2 weeks. She reports getting Kenalog shots at the beginning of allergy season in the past which really controlled her allergies well. She has never taken Singulair. She reports current wheezing and shortness of breath related to coughing.
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
## 2017-05-30 NOTE — ASSESSMENT & PLAN NOTE
Reports a history of severe GERD with schatzkie's ring development. She generally needs an EGD every 1-2 years for ring dilation. Her last was one year ago. She has had this procedure about 6 or 7 times in the past. She currently takes pantoprazole 40 mg twice daily. This controls her symptoms well, but if she takes it once daily she tends to have severe heartburn symptoms.  She has been on H2 blockers in the past which have not been helpful.
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
## 2017-05-30 NOTE — Clinical Note
The following two locations have pools which you can used for a small fee based on a sliding scale:    [place] [facility]  Address: [address] [place] [address]  Phone: [phone]    [facility]  Address: [address] [place] [address]  Phone: [phone]

## 2017-05-30 NOTE — ASSESSMENT & PLAN NOTE
Patient is currently taking levothyroxine 125 µg twice daily. She states this was prescribed by her previous endocrinologist. 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 States that she takes the medication on an empty stomach  it from any other medications or food by 30 minutes.

## 2017-05-30 NOTE — ASSESSMENT & PLAN NOTE
Patient was diagnosed with papillary thyroid carcinoma in 1999. She was treated surgically. She then reported she had a relapse in 2000 with a positive lymph node and was treated with radiation. She has been on thyroid replacement since then. She states her endocrinologist had her on Synthroid 125 µg twice daily because he wanted to make sure the cancer was suppressed. She is not sure when her last TSH was checked. She is interested in seeing an endocrinologist at this time. She is not sure what surveillance needs to be done for her history of cancer.

## 2017-05-30 NOTE — ASSESSMENT & PLAN NOTE
Patient reports a history of chronic low back pain for which she is to see pain management and take narcotic pain medications as well as tizanidine. She states she is to have a procedure which was done under anesthesia every 3 months but she is unsure what it was. She is interested in reestablishing care with a pain management doctor to restart these procedures. She was given a referral to pain management as well as physical therapy at her last office visit, but declined to schedule an appointment with pain management because they told her they could not see her until September. She states she never received the information for the physical therapist. X-rays of her lumbar spine and cervical spine were ordered at her visit with Elenita Friend but she never had these done.

## 2017-05-30 NOTE — ASSESSMENT & PLAN NOTE
Review of patient's chart shows that she first came to establish care with Elenita Friend on 4/27/17. At that time, there is documentation that the patient was demanding and argumentative in terms of her pain medication, especially when the clinical decision was made that she either needed to come off of her muscle relaxer or her pain medicine. There was no prescription given for her tizanidine, and she was given a reduced quantity of the Percocet 10/325. She had previously been getting #120 per month, which was reduced to #90 at that visit. Review of the [place]  shows patient filled a prescription for Norco 10/325 #120 on 4/5/17. At the time of her urine drug screen, if she had been taking her medicines as prescribed, she should've had about a weeks left of pain pills. Initially, she told Dr. Friend she was still taking the pain medication. Her urine drug screen came back negative for any opiates. Per documentation in office visit notes, when patient was confronted by this information, she changed her story multiple times, and then became very aggressive and argumentative towards the provider. This was severe enough to warrant a flag being put on her chart. I discussed with her today that in light of this information, I will not be prescribing her any opiate pain medicines or controlled substances.

## 2017-05-30 NOTE — ASSESSMENT & PLAN NOTE
History of vitamin D deficiency for which she takes 28651 u q 7 days.  Has not had a vitamin D level checked in over a year.

## 2017-06-08 ENCOUNTER — TELEPHONE (OUTPATIENT)
Dept: MEDICAL GROUP | Facility: MEDICAL CENTER | Age: 47
End: 2017-06-08

## 2017-06-14 DIAGNOSIS — J02.9 SORE THROAT: ICD-10-CM

## 2017-06-14 NOTE — TELEPHONE ENCOUNTER
[facility] pharmacy called and would like a new script for RX. Stated they are out of solution if you can rewrite for cream?      Was the patient seen in the last year in this department? Yes     Does patient have an active prescription for medications requested? Yes     Received Request Via: Pharmacy

## 2017-09-08 ENCOUNTER — APPOINTMENT (OUTPATIENT)
Dept: RADIOLOGY | Facility: MEDICAL CENTER | Age: 47
End: 2017-09-08
Attending: EMERGENCY MEDICINE
Payer: MEDICAID

## 2017-09-08 ENCOUNTER — HOSPITAL ENCOUNTER (EMERGENCY)
Facility: MEDICAL CENTER | Age: 47
End: 2017-09-08
Attending: EMERGENCY MEDICINE
Payer: MEDICAID

## 2017-09-08 VITALS
WEIGHT: 238.1 LBS | HEART RATE: 67 BPM | BODY MASS INDEX: 39.67 KG/M2 | SYSTOLIC BLOOD PRESSURE: 123 MMHG | DIASTOLIC BLOOD PRESSURE: 73 MMHG | OXYGEN SATURATION: 96 % | HEIGHT: 65 IN | TEMPERATURE: 98 F | RESPIRATION RATE: 18 BRPM

## 2017-09-08 DIAGNOSIS — S83.91XA SPRAIN OF RIGHT KNEE, UNSPECIFIED LIGAMENT, INITIAL ENCOUNTER: ICD-10-CM

## 2017-09-08 PROCEDURE — 99284 EMERGENCY DEPT VISIT MOD MDM: CPT

## 2017-09-08 PROCEDURE — 96372 THER/PROPH/DIAG INJ SC/IM: CPT

## 2017-09-08 PROCEDURE — 73562 X-RAY EXAM OF KNEE 3: CPT | Mod: RT

## 2017-09-08 PROCEDURE — 700111 HCHG RX REV CODE 636 W/ 250 OVERRIDE (IP): Performed by: EMERGENCY MEDICINE

## 2017-09-08 RX ORDER — LEVOTHYROXINE SODIUM 137 UG/1
137 TABLET ORAL
Status: SHIPPED | COMMUNITY
End: 2018-09-24 | Stop reason: SDUPTHER

## 2017-09-08 RX ORDER — ERGOCALCIFEROL 1.25 MG/1
50000 CAPSULE ORAL
Status: SHIPPED | COMMUNITY
End: 2020-09-23

## 2017-09-08 RX ORDER — OXYCODONE AND ACETAMINOPHEN 10; 325 MG/1; MG/1
1 TABLET ORAL 2 TIMES DAILY PRN
Status: SHIPPED | COMMUNITY
End: 2018-01-29

## 2017-09-08 RX ORDER — MELOXICAM 7.5 MG/1
7.5 TABLET ORAL DAILY
Qty: 30 TAB | Refills: 0 | Status: SHIPPED | OUTPATIENT
Start: 2017-09-08 | End: 2017-12-18

## 2017-09-08 RX ORDER — KETOROLAC TROMETHAMINE 30 MG/ML
60 INJECTION, SOLUTION INTRAMUSCULAR; INTRAVENOUS ONCE
Status: COMPLETED | OUTPATIENT
Start: 2017-09-08 | End: 2017-09-08

## 2017-09-08 RX ORDER — IBUPROFEN 800 MG/1
800 TABLET ORAL EVERY 8 HOURS PRN
Status: SHIPPED | COMMUNITY
End: 2018-10-18

## 2017-09-08 RX ADMIN — KETOROLAC TROMETHAMINE 60 MG: 30 INJECTION, SOLUTION INTRAMUSCULAR at 14:01

## 2017-09-08 ASSESSMENT — PAIN SCALES - GENERAL: PAINLEVEL_OUTOF10: 4

## 2017-09-08 NOTE — ED NOTES
The Medication Reconciliation process has been completed by interviewing the patient    Allergies have been reviewed  Antibiotic use in 30 days - none    Home Pharmacy:  [facility]
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
## 2017-09-08 NOTE — DISCHARGE INSTRUCTIONS
Knee Sprain  A knee sprain is a tear in one of the strong, fibrous tissues that connect the bones (ligaments) in your knee. The severity of the sprain depends on how much of the ligament is torn. The tear can be either partial or complete.  CAUSES   Often, sprains are a result of a fall or injury. The force of the impact causes the fibers of your ligament to stretch too much. This excess tension causes the fibers of your ligament to tear.  SIGNS AND SYMPTOMS   You may have some loss of motion in your knee. Other symptoms include:  · Bruising.  · Pain in the knee area.  · Tenderness of the knee to the touch.  · Swelling.  DIAGNOSIS   To diagnose a knee sprain, your health care provider will physically examine your knee. Your health care provider may also suggest an X-ray exam of your knee to make sure no bones are broken.  TREATMENT   If your ligament is only partially torn, treatment usually involves keeping the knee in a fixed position (immobilization) or bracing your knee for activities that require movement for several weeks. To do this, your health care provider will apply a bandage, cast, or splint to keep your knee from moving and to support your knee during movement until it heals. For a partially torn ligament, the healing process usually takes 4-6 weeks.  If your ligament is completely torn, depending on which ligament it is, you may need surgery to reconnect the ligament to the bone or reconstruct it. After surgery, a cast or splint may be applied and will need to stay on your knee for 4-6 weeks while your ligament heals.  HOME CARE INSTRUCTIONS  · Keep your injured knee elevated to decrease swelling.  · To ease pain and swelling, apply ice to the injured area:  ¨ Put ice in a plastic bag.  ¨ Place a towel between your skin and the bag.  ¨ Leave the ice on for 20 minutes, 2-3 times a day.  · Only take medicine for pain as directed by your health care provider.  · Do not leave your knee unprotected until  pain and stiffness go away (usually 4-6 weeks).  · If you have a cast or splint, do not allow it to get wet. If you have been instructed not to remove it, cover it with a plastic bag when you shower or bathe. Do not swim.  · Your health care provider may suggest exercises for you to do during your recovery to prevent or limit permanent weakness and stiffness.  SEEK IMMEDIATE MEDICAL CARE IF:  · Your cast or splint becomes damaged.  · Your pain becomes worse.  · You have significant pain, swelling, or numbness below the cast or splint.  MAKE SURE YOU:  · Understand these instructions.  · Will watch your condition.  · Will get help right away if you are not doing well or get worse.     This information is not intended to replace advice given to you by your health care provider. Make sure you discuss any questions you have with your health care provider.     Document Released: 12/18/2006 Document Revised: 01/08/2016 Document Reviewed: 07/30/2014  [employer] Interactive Patient Education ©2016 [employer]

## 2017-09-09 ENCOUNTER — PATIENT OUTREACH (OUTPATIENT)
Dept: HEALTH INFORMATION MANAGEMENT | Facility: OTHER | Age: 47
End: 2017-09-09

## 2017-11-07 DIAGNOSIS — J06.9 URI WITH COUGH AND CONGESTION: ICD-10-CM

## 2017-11-07 DIAGNOSIS — R05.8 PRODUCTIVE COUGH: ICD-10-CM

## 2017-11-07 RX ORDER — ALBUTEROL SULFATE 90 UG/1
2 AEROSOL, METERED RESPIRATORY (INHALATION) EVERY 6 HOURS PRN
Qty: 8.5 G | Refills: 0 | Status: SHIPPED | OUTPATIENT
Start: 2017-11-07 | End: 2018-01-30 | Stop reason: SDUPTHER

## 2017-12-18 ENCOUNTER — OFFICE VISIT (OUTPATIENT)
Dept: ENDOCRINOLOGY | Facility: MEDICAL CENTER | Age: 47
End: 2017-12-18
Payer: MEDICAID

## 2017-12-18 VITALS
BODY MASS INDEX: 40.42 KG/M2 | SYSTOLIC BLOOD PRESSURE: 128 MMHG | WEIGHT: 242.6 LBS | OXYGEN SATURATION: 96 % | DIASTOLIC BLOOD PRESSURE: 74 MMHG | HEIGHT: 65 IN | HEART RATE: 75 BPM

## 2017-12-18 DIAGNOSIS — C73 PAPILLARY THYROID CARCINOMA (HCC): ICD-10-CM

## 2017-12-18 DIAGNOSIS — E89.0 POSTSURGICAL HYPOTHYROIDISM: ICD-10-CM

## 2017-12-18 PROCEDURE — 99204 OFFICE O/P NEW MOD 45 MIN: CPT | Performed by: INTERNAL MEDICINE

## 2017-12-18 NOTE — PROGRESS NOTES
New Patient Consult Note    Reason for consult: History of thyroid cancer    HPI:  Jayashree Carrasco is a 47 y.o. old patient who comes in today for evaluation of thyroid cancer. She underwent total thyroidectomy in 1999 which showed papillary thyroid carcinoma, she received radioactive iodine ablation in 1999 and couple of years later for recurrence. No issues with thyroid cancer recurrence since then. No recent thyroid bed ultrasound. She denies noticing any neck lumps or masses. She is currently on levothyroxine 137 µg daily. She reports compliance with medications. She feels tired. She has gained 50 pounds in the past 6 months. No family history of thyroid cancer.    ROS:  Constitutional: Positive for fatigue and weight gain  Cardiac: No palpitations or racing heart  Resp: No shortness of breath  All other systems were reviewed and were negative.    Past Medical History:  Patient Active Problem List    Diagnosis Date Noted   • Mild intermittent asthma without complication 05/30/2017   • History of papillary adenocarcinoma of thyroid 05/30/2017   • GERD (gastroesophageal reflux disease) 05/30/2017   • Obesity (BMI 35.0-39.9 without comorbidity) 05/30/2017   • Abnormal drug screen 05/02/2017   • Chronic low back pain 04/12/2014   • Chronic neck pain 04/12/2014   • Vitamin D deficiency 04/12/2014   • Hypothyroidism 06/07/2012   • Fibromyalgia 06/07/2012       Past Surgical History:  Past Surgical History:   Procedure Laterality Date   • ABDOMINAL HYSTERECTOMY TOTAL     • CHOLECYSTECTOMY     • THYROIDECTOMY         Allergies:  Ciprofloxacin and Sulfa drugs    Social History:  Social History     Social History   • Marital status: Single     Spouse name: N/A   • Number of children: N/A   • Years of education: N/A     Occupational History   • Not on file.     Social History Main Topics   • Smoking status: Never Smoker   • Smokeless tobacco: Never Used   • Alcohol use No   • Drug use: No   • Sexual activity: Yes      "Partners: Male     Birth control/ protection: Surgical     Other Topics Concern   • Not on file     Social History Narrative   • No narrative on file       Family History:  Family History   Problem Relation Age of Onset   • Stroke Mother 40   • Cancer Mother      leukemia   • Cancer Maternal Aunt 72     breast   • Diabetes Maternal Grandfather    • Cancer Cousin 51     bone       Medications:    Current Outpatient Prescriptions:   •  albuterol 108 (90 Base) MCG/ACT Aero Soln inhalation aerosol, Inhale 2 Puffs by mouth every 6 hours as needed for Shortness of Breath., Disp: 8.5 g, Rfl: 0  •  levothyroxine (SYNTHROID) 137 MCG Tab, Take 137 mcg by mouth Every morning on an empty stomach., Disp: , Rfl:   •  vitamin D, Ergocalciferol, (DRISDOL) 24093 units Cap capsule, Take 50,000 Units by mouth every Saturday., Disp: , Rfl:   •  oxycodone-acetaminophen (PERCOCET-10)  MG Tab, Take 1 Tab by mouth 2 times a day as needed for Severe Pain., Disp: , Rfl:   •  ibuprofen (MOTRIN) 800 MG Tab, Take 800 mg by mouth every 8 hours as needed., Disp: , Rfl:   •  pantoprazole (PROTONIX) 40 MG Tablet Delayed Response, Take 1 Tab by mouth 2 times a day., Disp: 60 Tab, Rfl: 3  •  tizanidine (ZANAFLEX) 4 MG Tab, Take 1.5 Tabs by mouth 2 times a day as needed., Disp: 90 Tab, Rfl: 3  •  albuterol (PROVENTIL) 2.5mg/3ml Nebu Soln solution for nebulization, 3 mL by Nebulization route every four hours as needed for Shortness of Breath., Disp: 75 mL, Rfl: 2    Physical Examination:  Vital signs: /74   Pulse 75   Ht 1.651 m (5' 5\")   Wt 110 kg (242 lb 9.6 oz)   LMP 11/21/2013   SpO2 96%   BMI 40.37 kg/m²   General: No apparent distress, cooperative  Eyes: No scleral icterus or discharge  ENMT: Normal on external inspection of nose, lips  Neck: No abnormal masses on inspection, well-healed thyroidectomy scar  Resp: Normal effort, clear to auscultation bilaterally   CVS: Regular rate and rhythm, S1 S2 normal, no murmur "   Extremities: No edema  Neuro: Alert and oriented  Skin: No rash  Psych: Normal mood and affect    Assessment and Plan:    1. Papillary thyroid carcinoma (CMS-HCC)  · Status post total thyroidectomy in 1999, and radioactive iodine ablation in 1999 and 2001  · Diagnostic whole body scan in 2004 was negative  · No recent thyroid bed ultrasound and no recent thyroglobulin level to review  · We will obtain thyroid bed ultrasound now  · We will obtain thyroglobulin panel now and then again in 6 months  - THYROGLOBULIN, QT; Future  - ANTITHYROGLOBULIN AB; Future  - US-SOFT TISSUES OF HEAD - NECK; Future  - THYROGLOBULIN, QT; Future  - ANTITHYROGLOBULIN AB; Future    2. Postsurgical hypothyroidism  · We will repeat labs for TSH and free T4 now, and then again in 6 months  · For now continue levothyroxine 137 µg daily  - TSH; Future  - FREE THYROXINE; Future  - TSH; Future  - FREE THYROXINE; Future    Return in about 6 months (around 6/18/2018).    Thank you for allowing me to participate in the care of this patient.    Jerardo Storm M.D.  12/18/17    This note was created using voice recognition software (Dragon). The accuracy of the dictation is limited by the abilities of the software. I have reviewed the note prior to signing, however some errors in grammar and context are still possible. If you have any questions related to this note please do not hesitate to contact our office.

## 2018-01-04 ENCOUNTER — HOSPITAL ENCOUNTER (OUTPATIENT)
Dept: RADIOLOGY | Facility: MEDICAL CENTER | Age: 48
End: 2018-01-04
Attending: INTERNAL MEDICINE
Payer: MEDICAID

## 2018-01-04 DIAGNOSIS — C73 PAPILLARY THYROID CARCINOMA (HCC): ICD-10-CM

## 2018-01-04 PROCEDURE — 76536 US EXAM OF HEAD AND NECK: CPT

## 2018-01-29 ENCOUNTER — OFFICE VISIT (OUTPATIENT)
Dept: URGENT CARE | Facility: PHYSICIAN GROUP | Age: 48
End: 2018-01-29
Payer: MEDICAID

## 2018-01-29 VITALS
HEIGHT: 65 IN | TEMPERATURE: 97.8 F | BODY MASS INDEX: 39.99 KG/M2 | SYSTOLIC BLOOD PRESSURE: 126 MMHG | WEIGHT: 240 LBS | OXYGEN SATURATION: 95 % | HEART RATE: 80 BPM | RESPIRATION RATE: 16 BRPM | DIASTOLIC BLOOD PRESSURE: 60 MMHG

## 2018-01-29 DIAGNOSIS — R11.2 NON-INTRACTABLE VOMITING WITH NAUSEA, UNSPECIFIED VOMITING TYPE: ICD-10-CM

## 2018-01-29 DIAGNOSIS — R31.9 HEMATURIA, UNSPECIFIED TYPE: ICD-10-CM

## 2018-01-29 DIAGNOSIS — R30.0 DYSURIA: ICD-10-CM

## 2018-01-29 DIAGNOSIS — J06.9 UPPER RESPIRATORY TRACT INFECTION, UNSPECIFIED TYPE: ICD-10-CM

## 2018-01-29 LAB
APPEARANCE UR: CLEAR
BILIRUB UR STRIP-MCNC: NORMAL MG/DL
COLOR UR AUTO: NORMAL
GLUCOSE UR STRIP.AUTO-MCNC: NORMAL MG/DL
KETONES UR STRIP.AUTO-MCNC: NORMAL MG/DL
LEUKOCYTE ESTERASE UR QL STRIP.AUTO: NORMAL
NITRITE UR QL STRIP.AUTO: NORMAL
PH UR STRIP.AUTO: 5 [PH] (ref 5–8)
PROT UR QL STRIP: NORMAL MG/DL
RBC UR QL AUTO: NORMAL
SP GR UR STRIP.AUTO: 1.02
UROBILINOGEN UR STRIP-MCNC: NORMAL MG/DL

## 2018-01-29 PROCEDURE — 81002 URINALYSIS NONAUTO W/O SCOPE: CPT | Performed by: PHYSICIAN ASSISTANT

## 2018-01-29 PROCEDURE — 99214 OFFICE O/P EST MOD 30 MIN: CPT | Mod: 25 | Performed by: PHYSICIAN ASSISTANT

## 2018-01-29 RX ORDER — ONDANSETRON 4 MG/1
4 TABLET, ORALLY DISINTEGRATING ORAL EVERY 8 HOURS PRN
Qty: 10 TAB | Refills: 0 | Status: SHIPPED | OUTPATIENT
Start: 2018-01-29 | End: 2018-08-15 | Stop reason: ALTCHOICE

## 2018-01-29 RX ORDER — HYDROCODONE BITARTRATE AND ACETAMINOPHEN 10; 325 MG/1; MG/1
1-2 TABLET ORAL EVERY 6 HOURS PRN
COMMUNITY
End: 2018-09-13

## 2018-01-29 RX ORDER — FLUTICASONE PROPIONATE 50 MCG
2 SPRAY, SUSPENSION (ML) NASAL DAILY
Qty: 1 BOTTLE | Refills: 1 | Status: SHIPPED | OUTPATIENT
Start: 2018-01-29 | End: 2018-10-18

## 2018-01-29 RX ORDER — PHENAZOPYRIDINE HYDROCHLORIDE 200 MG/1
200 TABLET, FILM COATED ORAL 3 TIMES DAILY PRN
Qty: 6 TAB | Refills: 0 | Status: SHIPPED | OUTPATIENT
Start: 2018-01-29 | End: 2018-09-13

## 2018-01-29 ASSESSMENT — ENCOUNTER SYMPTOMS
SPUTUM PRODUCTION: 0
MYALGIAS: 0
COUGH: 1
VOMITING: 0
FEVER: 0
DIARRHEA: 0
WHEEZING: 0
NAUSEA: 1
SHORTNESS OF BREATH: 0
FLANK PAIN: 0
ABDOMINAL PAIN: 0
CHILLS: 0

## 2018-01-29 NOTE — PATIENT INSTRUCTIONS
"Use Tylenol and/or ibuprofen as needed for pain or fever.  Use a Celio Med, Neti Pot, or other nasal irrigation device daily.  Use Flonase daily.  May try a short course of a decongestant such as Sudafed.  May try Afrin nasal spray for 3-5 days and then discontinue use.  May try an over-the-counter antihistamine such as Zyrtec, Claritin, or Allegra.  Use a cool mist humidifier with distilled water.  Elevate the head of your bed a few inches.  Drink plenty of fluids and get adequate rest.  Follow up with primary care provider in a few days if not improving.  Return for new or worsening symptoms.      Upper Respiratory Infection, Adult  Most upper respiratory infections (URIs) are a viral infection of the air passages leading to the lungs. A URI affects the nose, throat, and upper air passages. The most common type of URI is nasopharyngitis and is typically referred to as \"the common cold.\"  URIs run their course and usually go away on their own. Most of the time, a URI does not require medical attention, but sometimes a bacterial infection in the upper airways can follow a viral infection. This is called a secondary infection. Sinus and middle ear infections are common types of secondary upper respiratory infections.  Bacterial pneumonia can also complicate a URI. A URI can worsen asthma and chronic obstructive pulmonary disease (COPD). Sometimes, these complications can require emergency medical care and may be life threatening.   CAUSES  Almost all URIs are caused by viruses. A virus is a type of germ and can spread from one person to another.   RISKS FACTORS  You may be at risk for a URI if:   · You smoke.    · You have chronic heart or lung disease.  · You have a weakened defense (immune) system.    · You are very young or very old.    · You have nasal allergies or asthma.  · You work in crowded or poorly ventilated areas.  · You work in health care facilities or schools.  SIGNS AND SYMPTOMS   Symptoms typically " develop 2-3 days after you come in contact with a cold virus. Most viral URIs last 7-10 days. However, viral URIs from the influenza virus (flu virus) can last 14-18 days and are typically more severe. Symptoms may include:   · Runny or stuffy (congested) nose.    · Sneezing.    · Cough.    · Sore throat.    · Headache.    · Fatigue.    · Fever.    · Loss of appetite.    · Pain in your forehead, behind your eyes, and over your cheekbones (sinus pain).  · Muscle aches.    DIAGNOSIS   Your health care provider may diagnose a URI by:  · Physical exam.  · Tests to check that your symptoms are not due to another condition such as:  ¨ Strep throat.  ¨ Sinusitis.  ¨ Pneumonia.  ¨ Asthma.  TREATMENT   A URI goes away on its own with time. It cannot be cured with medicines, but medicines may be prescribed or recommended to relieve symptoms. Medicines may help:  · Reduce your fever.  · Reduce your cough.  · Relieve nasal congestion.  HOME CARE INSTRUCTIONS   · Take medicines only as directed by your health care provider.    · Gargle warm saltwater or take cough drops to comfort your throat as directed by your health care provider.  · Use a warm mist humidifier or inhale steam from a shower to increase air moisture. This may make it easier to breathe.  · Drink enough fluid to keep your urine clear or pale yellow.    · Eat soups and other clear broths and maintain good nutrition.    · Rest as needed.    · Return to work when your temperature has returned to normal or as your health care provider advises. You may need to stay home longer to avoid infecting others. You can also use a face mask and careful hand washing to prevent spread of the virus.  · Increase the usage of your inhaler if you have asthma.    · Do not use any tobacco products, including cigarettes, chewing tobacco, or electronic cigarettes. If you need help quitting, ask your health care provider.  PREVENTION   The best way to protect yourself from getting a cold  is to practice good hygiene.   · Avoid oral or hand contact with people with cold symptoms.    · Wash your hands often if contact occurs.    There is no clear evidence that vitamin C, vitamin E, echinacea, or exercise reduces the chance of developing a cold. However, it is always recommended to get plenty of rest, exercise, and practice good nutrition.   SEEK MEDICAL CARE IF:   · You are getting worse rather than better.    · Your symptoms are not controlled by medicine.    · You have chills.  · You have worsening shortness of breath.  · You have brown or red mucus.  · You have yellow or brown nasal discharge.  · You have pain in your face, especially when you bend forward.  · You have a fever.  · You have swollen neck glands.  · You have pain while swallowing.  · You have white areas in the back of your throat.  SEEK IMMEDIATE MEDICAL CARE IF:   · You have severe or persistent:  ¨ Headache.  ¨ Ear pain.  ¨ Sinus pain.  ¨ Chest pain.  · You have chronic lung disease and any of the following:  ¨ Wheezing.  ¨ Prolonged cough.  ¨ Coughing up blood.  ¨ A change in your usual mucus.  · You have a stiff neck.  · You have changes in your:  ¨ Vision.  ¨ Hearing.  ¨ Thinking.  ¨ Mood.  MAKE SURE YOU:   · Understand these instructions.  · Will watch your condition.  · Will get help right away if you are not doing well or get worse.     This information is not intended to replace advice given to you by your health care provider. Make sure you discuss any questions you have with your health care provider.     Document Released: 06/13/2002 Document Revised: 05/03/2016 Document Reviewed: 03/25/2015  [employer] Interactive Patient Education ©2016 [employer]  Nausea and Vomiting  Nausea is a sick feeling that often comes before throwing up (vomiting). Vomiting is a reflex where stomach contents come out of your mouth. Vomiting can cause severe loss of body fluids (dehydration). Children and elderly adults can become dehydrated  quickly, especially if they also have diarrhea. Nausea and vomiting are symptoms of a condition or disease. It is important to find the cause of your symptoms.  CAUSES   · Direct irritation of the stomach lining. This irritation can result from increased acid production (gastroesophageal reflux disease), infection, food poisoning, taking certain medicines (such as nonsteroidal anti-inflammatory drugs), alcohol use, or tobacco use.  · Signals from the brain. These signals could be caused by a headache, heat exposure, an inner ear disturbance, increased pressure in the brain from injury, infection, a tumor, or a concussion, pain, emotional stimulus, or metabolic problems.  · An obstruction in the gastrointestinal tract (bowel obstruction).  · Illnesses such as diabetes, hepatitis, gallbladder problems, appendicitis, kidney problems, cancer, sepsis, atypical symptoms of a heart attack, or eating disorders.  · Medical treatments such as chemotherapy and radiation.  · Receiving medicine that makes you sleep (general anesthetic) during surgery.  DIAGNOSIS  Your caregiver may ask for tests to be done if the problems do not improve after a few days. Tests may also be done if symptoms are severe or if the reason for the nausea and vomiting is not clear. Tests may include:  · Urine tests.  · Blood tests.  · Stool tests.  · Cultures (to look for evidence of infection).  · X-rays or other imaging studies.  Test results can help your caregiver make decisions about treatment or the need for additional tests.  TREATMENT  You need to stay well hydrated. Drink frequently but in small amounts. You may wish to drink water, sports drinks, clear broth, or eat frozen ice pops or gelatin dessert to help stay hydrated. When you eat, eating slowly may help prevent nausea. There are also some antinausea medicines that may help prevent nausea.  HOME CARE INSTRUCTIONS   · Take all medicine as directed by your caregiver.  · If you do not have  an appetite, do not force yourself to eat. However, you must continue to drink fluids.  · If you have an appetite, eat a normal diet unless your caregiver tells you differently.  ¨ Eat a variety of complex carbohydrates (rice, wheat, potatoes, bread), lean meats, yogurt, fruits, and vegetables.  ¨ Avoid high-fat foods because they are more difficult to digest.  · Drink enough water and fluids to keep your urine clear or pale yellow.  · If you are dehydrated, ask your caregiver for specific rehydration instructions. Signs of dehydration may include:  ¨ Severe thirst.  ¨ Dry lips and mouth.  ¨ Dizziness.  ¨ Dark urine.  ¨ Decreasing urine frequency and amount.  ¨ Confusion.  ¨ Rapid breathing or pulse.  SEEK IMMEDIATE MEDICAL CARE IF:   · You have blood or brown flecks (like coffee grounds) in your vomit.  · You have black or bloody stools.  · You have a severe headache or stiff neck.  · You are confused.  · You have severe abdominal pain.  · You have chest pain or trouble breathing.  · You do not urinate at least once every 8 hours.  · You develop cold or clammy skin.  · You continue to vomit for longer than 24 to 48 hours.  · You have a fever.  MAKE SURE YOU:   · Understand these instructions.  · Will watch your condition.  · Will get help right away if you are not doing well or get worse.     This information is not intended to replace advice given to you by your health care provider. Make sure you discuss any questions you have with your health care provider.     Document Released: 12/18/2006 Document Revised: 03/11/2013 Document Reviewed: 05/16/2012  [employer] Interactive Patient Education ©2016 [employer]  Dysuria  Dysuria is pain or discomfort while urinating. The pain or discomfort may be felt in the tube that carries urine out of the bladder (urethra) or in the surrounding tissue of the genitals. The pain may also be felt in the groin area, lower abdomen, and lower back. You may have to urinate frequently  or have the sudden feeling that you have to urinate (urgency). Dysuria can affect both men and women, but is more common in women.  Dysuria can be caused by many different things, including:  · Urinary tract infection in women.  · Infection of the kidney or bladder.  · Kidney stones or bladder stones.  · Certain sexually transmitted infections (STIs), such as chlamydia.  · Dehydration.  · Inflammation of the vagina.  · Use of certain medicines.  · Use of certain soaps or scented products that cause irritation.  HOME CARE INSTRUCTIONS  Watch your dysuria for any changes. The following actions may help to reduce any discomfort you are feeling:  · Drink enough fluid to keep your urine clear or pale yellow.  · Empty your bladder often. Avoid holding urine for long periods of time.  · After a bowel movement or urination, women should cleanse from front to back, using each tissue only once.  · Empty your bladder after sexual intercourse.  · Take medicines only as directed by your health care provider.  · If you were prescribed an antibiotic medicine, finish it all even if you start to feel better.  · Avoid caffeine, tea, and alcohol. They can irritate the bladder and make dysuria worse. In men, alcohol may irritate the prostate.  · Keep all follow-up visits as directed by your health care provider. This is important.  · If you had any tests done to find the cause of dysuria, it is your responsibility to obtain your test results. Ask the lab or department performing the test when and how you will get your results. Talk with your health care provider if you have any questions about your results.  SEEK MEDICAL CARE IF:  · You develop pain in your back or sides.  · You have a fever.  · You have nausea or vomiting.  · You have blood in your urine.  · You are not urinating as often as you usually do.  SEEK IMMEDIATE MEDICAL CARE IF:  · You pain is severe and not relieved with medicines.  · You are unable to hold down any  fluids.  · You or someone else notices a change in your mental function.  · You have a rapid heartbeat at rest.  · You have shaking or chills.  · You feel extremely weak.     This information is not intended to replace advice given to you by your health care provider. Make sure you discuss any questions you have with your health care provider.     Document Released: 09/15/2005 Document Revised: 01/08/2016 Document Reviewed: 08/13/2015  [employer] Interactive Patient Education ©2016 [employer]

## 2018-01-29 NOTE — PROGRESS NOTES
Subjective:      Jayashree Carrasco is a 47 y.o. female who presents with Sinus Problem (x1wk nasal pressure/pain, headace, x4days back pain, nausea x6mon blood in urine)            Patient presents today with multiple complaints.    #1. She reports hematuria for the last 6 months or so. Also reports intermittent dysuria. History of UTIs. No fevers.    #2. She also reports rhinorrhea, congestion, sinus pressure, sore throat, and cough for the last week or so. Symptoms fluctuating. No shortness of breath, wheezing, fevers, or chills.    #. She reports nausea over the last few days which she believes is secondary to her sinus pressure and postnasal drainage. She would like a prescription for an antiemetic. Zofran has worked well for her in the past. No abdominal pain or diarrhea    She denies any alleviating or aggravating factors to any of the above-mentioned conditions        Review of Systems   Constitutional: Negative for chills and fever.   HENT: Positive for congestion.    Respiratory: Positive for cough. Negative for sputum production, shortness of breath and wheezing.    Gastrointestinal: Positive for nausea. Negative for abdominal pain, diarrhea and vomiting.   Genitourinary: Positive for dysuria and hematuria. Negative for flank pain, frequency and urgency.   Musculoskeletal: Negative for myalgias.     Allergies:Ciprofloxacin and Sulfa drugs    Current Outpatient Prescriptions Ordered in [facility]   Medication Sig Dispense Refill   • hydrocodone/acetaminophen (NORCO)  MG Tab Take 1-2 Tabs by mouth every 6 hours as needed.     • ondansetron (ZOFRAN ODT) 4 MG TABLET DISPERSIBLE Take 1 Tab by mouth every 8 hours as needed. 10 Tab 0   • fluticasone (FLONASE) 50 MCG/ACT nasal spray Spray 2 Sprays in nose every day. 1 Bottle 1   • phenazopyridine (PYRIDIUM) 200 MG Tab Take 1 Tab by mouth 3 times a day as needed. 6 Tab 0   • albuterol 108 (90 Base) MCG/ACT Aero Soln inhalation aerosol Inhale 2 Puffs by mouth every 6  "hours as needed for Shortness of Breath. 8.5 g 0   • levothyroxine (SYNTHROID) 137 MCG Tab Take 137 mcg by mouth Every morning on an empty stomach.     • vitamin D, Ergocalciferol, (DRISDOL) 26439 units Cap capsule Take 50,000 Units by mouth every Saturday.     • ibuprofen (MOTRIN) 800 MG Tab Take 800 mg by mouth every 8 hours as needed.     • albuterol (PROVENTIL) 2.5mg/3ml Nebu Soln solution for nebulization 3 mL by Nebulization route every four hours as needed for Shortness of Breath. 75 mL 2   • pantoprazole (PROTONIX) 40 MG Tablet Delayed Response Take 1 Tab by mouth 2 times a day. 60 Tab 3   • tizanidine (ZANAFLEX) 4 MG Tab Take 1.5 Tabs by mouth 2 times a day as needed. 90 Tab 3     No current Epic-ordered facility-administered medications on file.        Past Medical History:   Diagnosis Date   • ASTHMA    • Cancer (CMS-HCC)     papillary thyroid, removed in , recurrence  treated with radiation   • Chronic low back pain 2014    Takes 1-2 perc 10/325 usu only 1-2 times/day Last took perc a week rx from doc in [facility] ( [facility] [place])    • Chronic neck pain 2014   • Fibromyalgia    • GERD (gastroesophageal reflux disease)     w/ esophageal dilation X3   • Thyroid disease    • Unspecified vitamin D deficiency 2014       Social History   Substance Use Topics   • Smoking status: Never Smoker   • Smokeless tobacco: Never Used   • Alcohol use No       Family Status   Relation Status   • Mother    • Father Other   • Maternal Aunt    • Maternal Grandfather    • Cousin      Family History   Problem Relation Age of Onset   • Stroke Mother 40   • Cancer Mother      leukemia   • Cancer Maternal Aunt 72     breast   • Diabetes Maternal Grandfather    • Cancer Cousin 51     bone          Objective:     /60   Pulse 80   Temp 36.6 °C (97.8 °F)   Resp 16   Ht 1.651 m (5' 5\")   Wt 108.9 kg (240 lb)   LMP 2013   SpO2 95%   Breastfeeding? No   BMI 39.94 kg/m²      Physical " Exam   Constitutional: She is oriented to person, place, and time. She appears well-developed and well-nourished. No distress.   HENT:   Head: Normocephalic and atraumatic.   Right Ear: External ear normal.   Left Ear: External ear normal.   Mouth/Throat: Oropharynx is clear and moist.   No significant nasal mucosal edema or sinus tenderness   Eyes: Right eye exhibits no discharge. Left eye exhibits no discharge.   Neck: Normal range of motion. Neck supple.   Cardiovascular: Normal rate and regular rhythm.    Pulmonary/Chest: Effort normal and breath sounds normal. She has no wheezes. She has no rales.   Neurological: She is alert and oriented to person, place, and time.   Skin: Skin is warm and dry. She is not diaphoretic.   Psychiatric: She has a normal mood and affect. Her behavior is normal. Judgment and thought content normal.   Nursing note and vitals reviewed.    Labs: Urine positive for blood, otherwise unremarkable.          Assessment/Plan:     1. Dysuria  phenazopyridine (PYRIDIUM) 200 MG Tab    Urine positive for blood but no leukocytes, nitrates, or other abnormalities. Follow up with PCP/specialist as planned   2. Hematuria, unspecified type      Ongoing for months. Follow-up with specialist as planned. No leukocytes or nitrates.   3. Upper respiratory tract infection, unspecified type  fluticasone (FLONASE) 50 MCG/ACT nasal spray    Ongoing for about a week or so. Likely viral. Given written instructions and Flonase. Follow up with PCP.   4. Non-intractable vomiting with nausea, unspecified vomiting type  ondansetron (ZOFRAN ODT) 4 MG TABLET DISPERSIBLE    Likely secondary to nasal congestion. Given Zofran per request. Follow-up with PCP as needed       [employer] Interactive Patient Education given: Dysuria, URI, nausea and vomiting    Please note that this dictation was created using voice recognition software. I have made every reasonable attempt to correct obvious errors, but I expect that there are  errors of grammar and possibly content that I did not discover before finalizing the note.

## 2018-01-30 DIAGNOSIS — R05.8 PRODUCTIVE COUGH: ICD-10-CM

## 2018-01-30 DIAGNOSIS — J06.9 URI WITH COUGH AND CONGESTION: ICD-10-CM

## 2018-01-31 RX ORDER — ALBUTEROL SULFATE 90 UG/1
2 AEROSOL, METERED RESPIRATORY (INHALATION) EVERY 6 HOURS PRN
Qty: 8.5 G | Refills: 0 | Status: SHIPPED | OUTPATIENT
Start: 2018-01-31 | End: 2020-09-23

## 2018-02-16 RX ORDER — ALBUTEROL SULFATE 2.5 MG/3ML
2.5 SOLUTION RESPIRATORY (INHALATION) EVERY 4 HOURS PRN
Qty: 75 ML | Refills: 0 | Status: SHIPPED | OUTPATIENT
Start: 2018-02-16 | End: 2020-09-23

## 2018-02-16 RX ORDER — PANTOPRAZOLE SODIUM 40 MG/1
40 TABLET, DELAYED RELEASE ORAL 2 TIMES DAILY
Qty: 60 TAB | Refills: 0 | Status: ON HOLD | OUTPATIENT
Start: 2018-02-16 | End: 2022-01-28

## 2018-02-23 ENCOUNTER — HOSPITAL ENCOUNTER (OUTPATIENT)
Dept: RADIOLOGY | Facility: MEDICAL CENTER | Age: 48
End: 2018-02-23
Attending: PHYSICIAN ASSISTANT
Payer: MEDICAID

## 2018-02-23 DIAGNOSIS — R31.9 HEMATURIA SYNDROME: ICD-10-CM

## 2018-02-23 PROCEDURE — 700117 HCHG RX CONTRAST REV CODE 255: Performed by: PHYSICIAN ASSISTANT

## 2018-02-23 PROCEDURE — 74178 CT ABD&PLV WO CNTR FLWD CNTR: CPT

## 2018-02-23 RX ADMIN — IOHEXOL 100 ML: 350 INJECTION, SOLUTION INTRAVENOUS at 11:43

## 2018-06-09 ENCOUNTER — HOSPITAL ENCOUNTER (OUTPATIENT)
Facility: MEDICAL CENTER | Age: 48
End: 2018-06-09
Attending: FAMILY MEDICINE
Payer: MEDICAID

## 2018-06-09 ENCOUNTER — OFFICE VISIT (OUTPATIENT)
Dept: URGENT CARE | Facility: PHYSICIAN GROUP | Age: 48
End: 2018-06-09
Payer: MEDICAID

## 2018-06-09 VITALS
OXYGEN SATURATION: 98 % | BODY MASS INDEX: 39.64 KG/M2 | WEIGHT: 237.9 LBS | DIASTOLIC BLOOD PRESSURE: 78 MMHG | RESPIRATION RATE: 16 BRPM | TEMPERATURE: 98.3 F | HEIGHT: 65 IN | HEART RATE: 84 BPM | SYSTOLIC BLOOD PRESSURE: 124 MMHG

## 2018-06-09 DIAGNOSIS — Z11.3 SCREEN FOR STD (SEXUALLY TRANSMITTED DISEASE): ICD-10-CM

## 2018-06-09 DIAGNOSIS — N10 ACUTE PYELONEPHRITIS: ICD-10-CM

## 2018-06-09 DIAGNOSIS — J02.9 SORETHROAT: ICD-10-CM

## 2018-06-09 LAB
APPEARANCE UR: CLEAR
BILIRUB UR STRIP-MCNC: NEGATIVE MG/DL
COLOR UR AUTO: ABNORMAL
GLUCOSE UR STRIP.AUTO-MCNC: NEGATIVE MG/DL
INT CON NEG: NEGATIVE
INT CON POS: POSITIVE
KETONES UR STRIP.AUTO-MCNC: ABNORMAL MG/DL
LEUKOCYTE ESTERASE UR QL STRIP.AUTO: ABNORMAL
NITRITE UR QL STRIP.AUTO: NEGATIVE
PH UR STRIP.AUTO: 5 [PH] (ref 5–8)
PROT UR QL STRIP: ABNORMAL MG/DL
RBC UR QL AUTO: ABNORMAL
S PYO AG THROAT QL: NEGATIVE
SP GR UR STRIP.AUTO: 1.01
UROBILINOGEN UR STRIP-MCNC: NEGATIVE MG/DL

## 2018-06-09 PROCEDURE — 87086 URINE CULTURE/COLONY COUNT: CPT

## 2018-06-09 PROCEDURE — 87491 CHLMYD TRACH DNA AMP PROBE: CPT

## 2018-06-09 PROCEDURE — 87591 N.GONORRHOEAE DNA AMP PROB: CPT

## 2018-06-09 PROCEDURE — 81002 URINALYSIS NONAUTO W/O SCOPE: CPT | Performed by: FAMILY MEDICINE

## 2018-06-09 PROCEDURE — 99214 OFFICE O/P EST MOD 30 MIN: CPT | Mod: 25 | Performed by: FAMILY MEDICINE

## 2018-06-09 PROCEDURE — 87880 STREP A ASSAY W/OPTIC: CPT | Performed by: FAMILY MEDICINE

## 2018-06-09 RX ORDER — CEPHALEXIN 500 MG/1
1000 CAPSULE ORAL EVERY 6 HOURS
Qty: 112 CAP | Refills: 0 | Status: SHIPPED | OUTPATIENT
Start: 2018-06-09 | End: 2018-06-23

## 2018-06-09 RX ORDER — CEPHALEXIN 250 MG/1
250 CAPSULE ORAL
COMMUNITY
End: 2018-08-15

## 2018-06-09 RX ORDER — ONDANSETRON 4 MG/1
4 TABLET, ORALLY DISINTEGRATING ORAL EVERY 8 HOURS PRN
Qty: 10 TAB | Refills: 0 | Status: SHIPPED | OUTPATIENT
Start: 2018-06-09 | End: 2018-08-15 | Stop reason: ALTCHOICE

## 2018-06-09 RX ORDER — LIDOCAINE HYDROCHLORIDE 10 MG/ML
2.1 INJECTION, SOLUTION INFILTRATION; PERINEURAL ONCE
Status: CANCELLED | OUTPATIENT
Start: 2018-06-09 | End: 2018-06-09

## 2018-06-09 RX ORDER — CEFTRIAXONE 1 G/1
1 INJECTION, POWDER, FOR SOLUTION INTRAMUSCULAR; INTRAVENOUS ONCE
Status: CANCELLED | OUTPATIENT
Start: 2018-06-09 | End: 2018-06-09

## 2018-06-09 RX ORDER — FLUCONAZOLE 150 MG/1
150 TABLET ORAL
Qty: 1 TAB | Refills: 0 | Status: SHIPPED
Start: 2018-06-09 | End: 2018-08-15

## 2018-06-09 ASSESSMENT — PAIN SCALES - GENERAL: PAINLEVEL: 7=MODERATE-SEVERE PAIN

## 2018-06-09 NOTE — PROGRESS NOTES
Subjective:      CC:  presents with Dysuria            Dysuria   This is a new problem. The current episode started in the past 7 days. The problem occurs every urination. The problem has been unchanged. The quality of the pain is described as burning. There has been no fever. Pt is not sexually active. There is no history of pyelonephritis. Associated symptoms include frequency and urgency. Pertinent negatives include no chills, discharge, flank pain, nausea or vomiting. Pt has tried nothing for the symptoms. There is no history of recurrent UTIs.       #2. Sore throat x 2 d    Social History     Social History   • Marital status: Single     Spouse name: N/A   • Number of children: N/A   • Years of education: N/A     Occupational History   • Not on file.     Social History Main Topics   • Smoking status: Never Smoker   • Smokeless tobacco: Never Used   • Alcohol use No   • Drug use: No   • Sexual activity: Yes     Partners: Male     Birth control/ protection: Surgical     Other Topics Concern   • Not on file     Social History Narrative   • No narrative on file         Family History   Problem Relation Age of Onset   • Stroke Mother 40   • Cancer Mother      leukemia   • Cancer Maternal Aunt 72     breast   • Diabetes Maternal Grandfather    • Cancer Cousin 51     bone         Allergies   Allergen Reactions   • Ciprofloxacin Rash     Rxn - about 2012   • Sulfa Drugs Rash     Rxn - about 2012           Current Outpatient Prescriptions on File Prior to Visit   Medication Sig Dispense Refill   • albuterol 108 (90 Base) MCG/ACT Aero Soln inhalation aerosol Inhale 2 Puffs by mouth every 6 hours as needed for Shortness of Breath. 8.5 g 0   • hydrocodone/acetaminophen (NORCO)  MG Tab Take 1-2 Tabs by mouth every 6 hours as needed.     • levothyroxine (SYNTHROID) 137 MCG Tab Take 137 mcg by mouth Every morning on an empty stomach.     • tizanidine (ZANAFLEX) 4 MG Tab Take 1.5 Tabs by mouth 2 times a day as needed. 90  "Tab 3   • albuterol (PROVENTIL) 2.5mg/3ml Nebu Soln solution for nebulization 3 mL by Nebulization route every four hours as needed for Shortness of Breath. 75 mL 0   • pantoprazole (PROTONIX) 40 MG Tablet Delayed Response Take 1 Tab by mouth 2 times a day. 60 Tab 0   • ondansetron (ZOFRAN ODT) 4 MG TABLET DISPERSIBLE Take 1 Tab by mouth every 8 hours as needed. 10 Tab 0   • fluticasone (FLONASE) 50 MCG/ACT nasal spray Spray 2 Sprays in nose every day. 1 Bottle 1   • phenazopyridine (PYRIDIUM) 200 MG Tab Take 1 Tab by mouth 3 times a day as needed. 6 Tab 0   • vitamin D, Ergocalciferol, (DRISDOL) 53047 units Cap capsule Take 50,000 Units by mouth every Saturday.     • ibuprofen (MOTRIN) 800 MG Tab Take 800 mg by mouth every 8 hours as needed.       No current facility-administered medications on file prior to visit.        Review of Systems   Constitutional: Negative for chills.   Respiratory: Negative for shortness of breath.    Cardiovascular: Negative for chest pain.   Gastrointestinal: Negative for nausea, vomiting and abdominal pain.   Genitourinary: Positive for dysuria, urgency and frequency. Negative for flank pain.   Skin: Negative for rash.   Neurological: Negative for dizziness and headaches.   All other systems reviewed and are negative.         Objective:      Blood pressure 124/78, pulse 84, temperature 36.8 °C (98.3 °F), resp. rate 16, height 1.651 m (5' 5\"), weight 107.9 kg (237 lb 14.4 oz), last menstrual period 11/21/2013, SpO2 98 %.      Physical Exam   Constitutional: pt is oriented to person, place, and time. Pt appears well-developed and well-nourished. No distress.   HENT:   Head: Normocephalic and atraumatic.   Mouth/Throat: Mucous membranes are normal.   There is no posterior pharyngeal edema, erythema or exudate.   Tonsils were not visualized, but there was some clear PND noted  Lymph - no cervical LAD  Eyes: Conjunctivae and EOM are normal. Pupils are equal, round, and reactive to light. " Right eye exhibits no discharge. Left eye exhibits no discharge. No scleral icterus.   Neck: Normal range of motion. Neck supple.   Cardiovascular: Normal rate, regular rhythm, normal heart sounds and intact distal pulses.    No murmur heard.  Pulmonary/Chest: Effort normal and breath sounds normal. No respiratory distress. Pt has no wheezes,  rales.   Abdominal: Bowel sounds are normal. Pt exhibits no distension and no mass. There is no tenderness. There is no rebound, no guarding, but there is right sided CVA tenderness.   Neurological: pt is alert and oriented to person, place, and time.   Skin: Skin is warm and dry.   Psychiatric: behavior is normal.   Nursing note and vitals reviewed.           Lab Results   Component Value Date/Time    POCCOLOR orange 06/09/2018 12:14 PM    POCAPPEAR clear 06/09/2018 12:14 PM    POCLEUKEST small 06/09/2018 12:14 PM    POCNITRITE negative 06/09/2018 12:14 PM    POCUROBILIGE negative 06/09/2018 12:14 PM    POCPROTEIN trace 06/09/2018 12:14 PM    POCURPH 5.0 06/09/2018 12:14 PM    POCBLOOD moderate 06/09/2018 12:14 PM    POCSPGRV 1.015 06/09/2018 12:14 PM    POCKETONES trace 06/09/2018 12:14 PM    POCBILIRUBIN negative 06/09/2018 12:14 PM    POCGLUCUA negative 06/09/2018 12:14 PM            Assessment/Plan:     1. Acute pyelonephritis  UA c/w infection   - cephALEXin (KEFLEX) 250 MG Cap; Take 250 mg by mouth 1 time daily as needed.     - fluconazole (DIFLUCAN) 150 MG tablet; Take 1 Tab by mouth Once PRN for up to 1 dose.  Dispense: 1 Tab; Refill: 0  - cephALEXin (KEFLEX) 500 MG Cap; Take 2 Caps by mouth every 6 hours for 14 days.  Dispense: 112 Cap; Refill: 0        2.  Screen for STD (sexually transmitted disease)     - HIV-1/HIV-2 SINGLE RESULT; Future  - RPR QUAL W/REFLEX  - VAGINAL PATHOGENS DNA PANEL; Future  - CHLAMYDIA/GC PCR URINE OR SWAB; Future  - URINE CULTURE(NEW); Future     3.   Sore throat  Rapid strep negative.     OTC flonse for the post nasal drip  MBX  solution

## 2018-06-11 ENCOUNTER — TELEPHONE (OUTPATIENT)
Dept: URGENT CARE | Facility: PHYSICIAN GROUP | Age: 48
End: 2018-06-11

## 2018-06-11 DIAGNOSIS — Z11.3 SCREEN FOR STD (SEXUALLY TRANSMITTED DISEASE): ICD-10-CM

## 2018-06-11 DIAGNOSIS — N30.00 ACUTE CYSTITIS WITHOUT HEMATURIA: ICD-10-CM

## 2018-06-11 NOTE — TELEPHONE ENCOUNTER
Pt called regarding her Rx for Diflucan, Pt is requesting Rx be sent to [facility], Pt also stated she was told she would be receiving an Rx for Lidocaine rinse, Please advise.

## 2018-06-12 DIAGNOSIS — J02.9 SORE THROAT: ICD-10-CM

## 2018-06-12 LAB
C TRACH DNA SPEC QL NAA+PROBE: NEGATIVE
N GONORRHOEA DNA SPEC QL NAA+PROBE: NEGATIVE
SPECIMEN SOURCE: NORMAL

## 2018-06-12 RX ORDER — FLUCONAZOLE 150 MG/1
150 TABLET ORAL
Qty: 1 TAB | Refills: 0 | Status: SHIPPED | OUTPATIENT
Start: 2018-06-12 | End: 2018-08-15

## 2018-06-13 ENCOUNTER — TELEPHONE (OUTPATIENT)
Dept: URGENT CARE | Facility: PHYSICIAN GROUP | Age: 48
End: 2018-06-13

## 2018-06-18 ENCOUNTER — APPOINTMENT (OUTPATIENT)
Dept: ENDOCRINOLOGY | Facility: MEDICAL CENTER | Age: 48
End: 2018-06-18
Payer: MEDICAID

## 2018-08-15 ENCOUNTER — HOSPITAL ENCOUNTER (EMERGENCY)
Facility: MEDICAL CENTER | Age: 48
End: 2018-08-15
Attending: EMERGENCY MEDICINE
Payer: MEDICAID

## 2018-08-15 ENCOUNTER — APPOINTMENT (OUTPATIENT)
Dept: RADIOLOGY | Facility: MEDICAL CENTER | Age: 48
End: 2018-08-15
Attending: EMERGENCY MEDICINE
Payer: MEDICAID

## 2018-08-15 VITALS
RESPIRATION RATE: 19 BRPM | TEMPERATURE: 98.2 F | WEIGHT: 236.99 LBS | DIASTOLIC BLOOD PRESSURE: 82 MMHG | SYSTOLIC BLOOD PRESSURE: 115 MMHG | HEART RATE: 78 BPM | BODY MASS INDEX: 41.99 KG/M2 | HEIGHT: 63 IN | OXYGEN SATURATION: 92 %

## 2018-08-15 DIAGNOSIS — G89.29 CHRONIC NECK PAIN: ICD-10-CM

## 2018-08-15 DIAGNOSIS — K21.9 GASTROESOPHAGEAL REFLUX DISEASE, ESOPHAGITIS PRESENCE NOT SPECIFIED: ICD-10-CM

## 2018-08-15 DIAGNOSIS — R10.84 GENERALIZED ABDOMINAL PAIN: ICD-10-CM

## 2018-08-15 DIAGNOSIS — M54.2 CHRONIC NECK PAIN: ICD-10-CM

## 2018-08-15 DIAGNOSIS — E89.0 POSTOPERATIVE HYPOTHYROIDISM: ICD-10-CM

## 2018-08-15 LAB
ALBUMIN SERPL BCP-MCNC: 4.3 G/DL (ref 3.2–4.9)
ALBUMIN/GLOB SERPL: 1.3 G/DL
ALP SERPL-CCNC: 62 U/L (ref 30–99)
ALT SERPL-CCNC: 22 U/L (ref 2–50)
ANION GAP SERPL CALC-SCNC: 9 MMOL/L (ref 0–11.9)
APPEARANCE UR: CLEAR
AST SERPL-CCNC: 17 U/L (ref 12–45)
BACTERIA #/AREA URNS HPF: ABNORMAL /HPF
BASOPHILS # BLD AUTO: 0.5 % (ref 0–1.8)
BASOPHILS # BLD: 0.06 K/UL (ref 0–0.12)
BILIRUB SERPL-MCNC: 0.4 MG/DL (ref 0.1–1.5)
BILIRUB UR QL STRIP.AUTO: NEGATIVE
BUN SERPL-MCNC: 13 MG/DL (ref 8–22)
CALCIUM SERPL-MCNC: 9.6 MG/DL (ref 8.5–10.5)
CHLORIDE SERPL-SCNC: 106 MMOL/L (ref 96–112)
CO2 SERPL-SCNC: 23 MMOL/L (ref 20–33)
COLOR UR: YELLOW
CREAT SERPL-MCNC: 0.75 MG/DL (ref 0.5–1.4)
EOSINOPHIL # BLD AUTO: 0.22 K/UL (ref 0–0.51)
EOSINOPHIL NFR BLD: 2 % (ref 0–6.9)
EPI CELLS #/AREA URNS HPF: ABNORMAL /HPF
ERYTHROCYTE [DISTWIDTH] IN BLOOD BY AUTOMATED COUNT: 45.4 FL (ref 35.9–50)
GLOBULIN SER CALC-MCNC: 3.2 G/DL (ref 1.9–3.5)
GLUCOSE SERPL-MCNC: 99 MG/DL (ref 65–99)
GLUCOSE UR STRIP.AUTO-MCNC: NEGATIVE MG/DL
HCT VFR BLD AUTO: 45.5 % (ref 37–47)
HGB BLD-MCNC: 15.3 G/DL (ref 12–16)
HYALINE CASTS #/AREA URNS LPF: ABNORMAL /LPF
IMM GRANULOCYTES # BLD AUTO: 0.05 K/UL (ref 0–0.11)
IMM GRANULOCYTES NFR BLD AUTO: 0.4 % (ref 0–0.9)
KETONES UR STRIP.AUTO-MCNC: NEGATIVE MG/DL
LEUKOCYTE ESTERASE UR QL STRIP.AUTO: NEGATIVE
LIPASE SERPL-CCNC: 18 U/L (ref 11–82)
LYMPHOCYTES # BLD AUTO: 2.3 K/UL (ref 1–4.8)
LYMPHOCYTES NFR BLD: 20.6 % (ref 22–41)
MCH RBC QN AUTO: 30 PG (ref 27–33)
MCHC RBC AUTO-ENTMCNC: 33.6 G/DL (ref 33.6–35)
MCV RBC AUTO: 89.2 FL (ref 81.4–97.8)
MICRO URNS: ABNORMAL
MONOCYTES # BLD AUTO: 0.68 K/UL (ref 0–0.85)
MONOCYTES NFR BLD AUTO: 6.1 % (ref 0–13.4)
NEUTROPHILS # BLD AUTO: 7.87 K/UL (ref 2–7.15)
NEUTROPHILS NFR BLD: 70.4 % (ref 44–72)
NITRITE UR QL STRIP.AUTO: NEGATIVE
NRBC # BLD AUTO: 0 K/UL
NRBC BLD-RTO: 0 /100 WBC
PH UR STRIP.AUTO: 5 [PH]
PLATELET # BLD AUTO: 274 K/UL (ref 164–446)
PMV BLD AUTO: 10 FL (ref 9–12.9)
POTASSIUM SERPL-SCNC: 4 MMOL/L (ref 3.6–5.5)
PROT SERPL-MCNC: 7.5 G/DL (ref 6–8.2)
PROT UR QL STRIP: NEGATIVE MG/DL
RBC # BLD AUTO: 5.1 M/UL (ref 4.2–5.4)
RBC # URNS HPF: ABNORMAL /HPF
RBC UR QL AUTO: ABNORMAL
SODIUM SERPL-SCNC: 138 MMOL/L (ref 135–145)
SP GR UR STRIP.AUTO: 1.02
T4 FREE SERPL-MCNC: 0.89 NG/DL (ref 0.53–1.43)
TROPONIN I SERPL-MCNC: <0.01 NG/ML (ref 0–0.04)
TSH SERPL DL<=0.005 MIU/L-ACNC: 2.74 UIU/ML (ref 0.38–5.33)
UROBILINOGEN UR STRIP.AUTO-MCNC: 0.2 MG/DL
WBC # BLD AUTO: 11.2 K/UL (ref 4.8–10.8)
WBC #/AREA URNS HPF: ABNORMAL /HPF

## 2018-08-15 PROCEDURE — 96374 THER/PROPH/DIAG INJ IV PUSH: CPT

## 2018-08-15 PROCEDURE — 74177 CT ABD & PELVIS W/CONTRAST: CPT

## 2018-08-15 PROCEDURE — 36415 COLL VENOUS BLD VENIPUNCTURE: CPT

## 2018-08-15 PROCEDURE — 81001 URINALYSIS AUTO W/SCOPE: CPT

## 2018-08-15 PROCEDURE — 93005 ELECTROCARDIOGRAM TRACING: CPT | Performed by: EMERGENCY MEDICINE

## 2018-08-15 PROCEDURE — 700111 HCHG RX REV CODE 636 W/ 250 OVERRIDE (IP): Performed by: EMERGENCY MEDICINE

## 2018-08-15 PROCEDURE — 84439 ASSAY OF FREE THYROXINE: CPT

## 2018-08-15 PROCEDURE — 83690 ASSAY OF LIPASE: CPT

## 2018-08-15 PROCEDURE — 85025 COMPLETE CBC W/AUTO DIFF WBC: CPT

## 2018-08-15 PROCEDURE — 84484 ASSAY OF TROPONIN QUANT: CPT

## 2018-08-15 PROCEDURE — 80053 COMPREHEN METABOLIC PANEL: CPT

## 2018-08-15 PROCEDURE — 84443 ASSAY THYROID STIM HORMONE: CPT

## 2018-08-15 PROCEDURE — 700117 HCHG RX CONTRAST REV CODE 255: Performed by: EMERGENCY MEDICINE

## 2018-08-15 PROCEDURE — 96375 TX/PRO/DX INJ NEW DRUG ADDON: CPT

## 2018-08-15 PROCEDURE — 96376 TX/PRO/DX INJ SAME DRUG ADON: CPT

## 2018-08-15 PROCEDURE — 99285 EMERGENCY DEPT VISIT HI MDM: CPT

## 2018-08-15 RX ORDER — ONDANSETRON 2 MG/ML
4 INJECTION INTRAMUSCULAR; INTRAVENOUS ONCE
Status: COMPLETED | OUTPATIENT
Start: 2018-08-15 | End: 2018-08-15

## 2018-08-15 RX ORDER — MORPHINE SULFATE 4 MG/ML
4 INJECTION, SOLUTION INTRAMUSCULAR; INTRAVENOUS ONCE
Status: COMPLETED | OUTPATIENT
Start: 2018-08-15 | End: 2018-08-15

## 2018-08-15 RX ORDER — ONDANSETRON 4 MG/1
4 TABLET, ORALLY DISINTEGRATING ORAL EVERY 6 HOURS PRN
Qty: 10 TAB | Refills: 0 | Status: SHIPPED | OUTPATIENT
Start: 2018-08-15 | End: 2019-03-29 | Stop reason: CLARIF

## 2018-08-15 RX ADMIN — ONDANSETRON 4 MG: 2 INJECTION INTRAMUSCULAR; INTRAVENOUS at 11:55

## 2018-08-15 RX ADMIN — IOHEXOL 100 ML: 350 INJECTION, SOLUTION INTRAVENOUS at 12:15

## 2018-08-15 RX ADMIN — ONDANSETRON 4 MG: 2 INJECTION, SOLUTION INTRAMUSCULAR; INTRAVENOUS at 10:08

## 2018-08-15 RX ADMIN — MORPHINE SULFATE 4 MG: 4 INJECTION INTRAVENOUS at 10:08

## 2018-08-15 RX ADMIN — MORPHINE SULFATE 4 MG: 4 INJECTION INTRAVENOUS at 11:55

## 2018-08-15 ASSESSMENT — PAIN SCALES - GENERAL
PAINLEVEL_OUTOF10: 7
PAINLEVEL_OUTOF10: 8

## 2018-08-15 NOTE — ED NOTES
PIV removed, patient instructed not to drive home after receiving narcotics, states daughter will be picking her up.

## 2018-08-15 NOTE — ED TRIAGE NOTES
Ambulates to triage  Chief Complaint   Patient presents with   • RUQ Pain     hx liver enlargement, has had pain x3 months   • Abdominal Pain     bloody stooly on and off for 1 month, was placed on prilosec and carafate last month for bleeding ulcer   • Nausea     Pt has also has a cough, hx of asthma, 97% RA.  C/o of aching joints and swelling to hands and feet on and off.

## 2018-08-15 NOTE — ED NOTES
Patient given discharge instructions, follow up information, and prescription x 1, verbalized understanding, ambulatory out of ED w/steady gait.

## 2018-08-15 NOTE — ED PROVIDER NOTES
ED Provider Note    CHIEF COMPLAINT  Chief Complaint   Patient presents with   • RUQ Pain     hx liver enlargement, has had pain x3 months   • Abdominal Pain     bloody stooly on and off for 1 month, was placed on prilosec and carafate last month for bleeding ulcer   • Nausea       HPI  Jayashree Carrasco is a 48 y.o. female who presents for evaluation of abdominal pain.  Patient states she has been having intermittent abdominal pain for 3 months.  Patient was hospitalized in [place], [place] was told she had an enlarged liver and that she needed to follow-up with gastroenterology for an endoscopy as she has had intermittent bloody stools and black stools.  Patient denies recent: Fever, chills, URI symptoms, cardiorespiratory symptoms, hematemesis, coffee-ground emesis, hematuria, dysuria.  Patient has had a previous cholecystectomy.  She has had a previous hysterectomy.  She is scheduled for endoscopy in October with gastroenterology and [facility].  No other acute symptomatology or complaints.    REVIEW OF SYSTEMS  See HPI for further details. All other systems negative.    PAST MEDICAL HISTORY  Past Medical History:   Diagnosis Date   • ASTHMA    • Cancer (HCC)     papillary thyroid, removed in 1999, recurrence 2000 treated with radiation   • Chronic low back pain 4/12/2014    Takes 1-2 perc 10/325 usu only 1-2 times/day Last took perc a week rx from doc in [facility] ( [facility] [place])    • Chronic neck pain 4/12/2014   • Fibromyalgia    • GERD (gastroesophageal reflux disease)     w/ esophageal dilation X3   • Thyroid disease    • Unspecified vitamin D deficiency 4/12/2014       FAMILY HISTORY  Family History   Problem Relation Age of Onset   • Stroke Mother 40   • Cancer Mother         leukemia   • Cancer Maternal Aunt 72        breast   • Diabetes Maternal Grandfather    • Cancer Cousin 51        bone       SOCIAL HISTORY  She lives in [place], [place]; she denies tobacco, alcohol,  "drug;    SURGICAL HISTORY  Past Surgical History:   Procedure Laterality Date   • ABDOMINAL HYSTERECTOMY TOTAL     • CHOLECYSTECTOMY     • THYROIDECTOMY         CURRENT MEDICATIONS  See nurse's notes    ALLERGIES  Allergies   Allergen Reactions   • Propofol      Hypotension, and severe asthma attack   • Ciprofloxacin Rash     Rxn - about 2012   • Sulfa Drugs Rash     Rxn - about 2012       PHYSICAL EXAM  VITAL SIGNS: /82   Pulse 83   Temp 36.8 °C (98.2 °F)   Resp 20   Ht 1.6 m (5' 3\") Comment: Simultaneous filing. User may not have seen previous data.  Wt 107.5 kg (236 lb 15.9 oz)   LMP 11/21/2013   SpO2 97%   BMI 41.98 kg/m²    Constitutional: 40-year-old female, anxious, sitting fairly comfortably, oriented ×3  HENT: ,Atraumatic, Bilateral external ears normal, tympanic membranes clear, Oropharynx moist, No oral exudates, Nose normal.   Eyes: PERRL, EOMI, Conjunctiva normal, No discharge.   Neck: Normal range of motion, No tenderness, Supple, No stridor.   Lymphatic: No lymphadenopathy noted.   Cardiovascular: Normal heart rate, Normal rhythm, No murmurs, No rubs, No gallops.   Thorax & Lungs: Normal Equal breath sounds, No respiratory distress, No wheezing, no stridor, no rales. No chest tenderness.   Abdomen: Tenderness in the epigastric and right upper quadrant area with some diffuse nonlocalized tenderness in the periumbilical area, no organomegaly, positive bowel sounds normal in quality. No guarding or rebound.  Skin: Good skin turgor, pink, warm, dry. No rashes, petechiae, purpura. Normal capillary refill.   Back: No tenderness, No CVA tenderness.   Rectal: Performed with a nurse at the bedside; normal appearance, Normal sphincter tone. No external or internal lesions noted. Stool is normal color and heme negative.   Extremities: Intact distal pulses, No edema, No tenderness, No cyanosis, No clubbing. Vascular: Pulses are 2+, symmetric in the upper and lower extremities.  Musculoskeletal: Good " range of motion in all major joints. No tenderness to palpation or major deformities noted.   Neurologic: Alert & oriented x 3, Normal motor function, Normal sensory function, No gross focal deficits noted.   Psychiatric: Affect normal, Judgment normal, Mood normal.     EKG  I have interpreted: Rate 70, rhythm sinus, axis normal, NM QRS and QT intervals normal, no acute STEMI, 12-lead EKG, no change compared to previous tracing;    RADIOLOGY/PROCEDURES  CT-ABDOMEN-PELVIS WITH   Final Result         1. No acute inflammatory change identified in the abdomen or pelvis.      2. Mild wall thickening of the distal esophagus could relate to esophagitis.            COURSE & MEDICAL DECISION MAKING  Pertinent Labs & Imaging studies reviewed. (See chart for details)  1.  Saline lock  2.  Zofran, titrated  3.  Morphine, titrated    Laboratory studies: CBC shows white count 11.2, 70% neutrophils, 20% monocytes, 6% monocytes, hemoglobin 15.3, hematocrit 45.5; urinalysis is negative for nitrite and leukocyte esterase, WBCs 0-2, RBCs 2-5, bacteria few, epithelial cells moderate; CMP within normal; lipase 18; troponin less than 0.01; thyroid function tests within normal;    Discussion: At this time, the patient presents for evaluation of abdominal pain.  Patient underwent a workup which does not show any significant abnormalities.  CT scanning also did not show any abnormalities other than the possibility of mild wall thickening in the distal esophagus could be related to esophagitis.  Patient is currently on a proton pump inhibitor.  At this point I discussed dietary restriction with the patient.  She is instructed to follow-up with gastroenterology whom she is scheduled to see in October for a gastroscopy and colonoscopy.  Patient is stable for discharge.  No indication for emergent intervention hospitalization.  Patient indicates she is comfortable with this explanation disposition.    FINAL IMPRESSION  1. Generalized abdominal  pain    2. Postoperative hypothyroidism    3. Chronic neck pain    4. Gastroesophageal reflux disease, esophagitis presence not specified         PLAN  1.  Appropriate discharge instructions given  2.  Continue current medications  3.  Follow-up gastroenterology    Electronically signed by: Guy G Gansert, 8/15/2018 9:54 AM

## 2018-08-15 NOTE — ED NOTES
[place] Fall Risk assessment complete, bed locked and in lowest position, siderails x 2 up, call light and personal belongings within reach.

## 2018-08-16 ENCOUNTER — PATIENT OUTREACH (OUTPATIENT)
Dept: HEALTH INFORMATION MANAGEMENT | Facility: OTHER | Age: 48
End: 2018-08-16

## 2018-08-16 ENCOUNTER — APPOINTMENT (OUTPATIENT)
Dept: PAIN MANAGEMENT | Facility: REHABILITATION | Age: 48
End: 2018-08-16
Attending: PHYSICAL MEDICINE & REHABILITATION
Payer: MEDICAID

## 2018-09-13 ENCOUNTER — HOSPITAL ENCOUNTER (OUTPATIENT)
Dept: PAIN MANAGEMENT | Facility: REHABILITATION | Age: 48
End: 2018-09-13
Attending: PHYSICAL MEDICINE & REHABILITATION
Payer: MEDICAID

## 2018-09-13 ENCOUNTER — HOSPITAL ENCOUNTER (OUTPATIENT)
Dept: RADIOLOGY | Facility: REHABILITATION | Age: 48
End: 2018-09-13
Attending: PHYSICAL MEDICINE & REHABILITATION
Payer: MEDICAID

## 2018-09-13 VITALS
BODY MASS INDEX: 42.3 KG/M2 | RESPIRATION RATE: 16 BRPM | WEIGHT: 238.76 LBS | SYSTOLIC BLOOD PRESSURE: 102 MMHG | HEART RATE: 60 BPM | OXYGEN SATURATION: 96 % | TEMPERATURE: 97.6 F | HEIGHT: 63 IN | DIASTOLIC BLOOD PRESSURE: 69 MMHG

## 2018-09-13 PROCEDURE — 20553 NJX 1/MLT TRIGGER POINTS 3/>: CPT

## 2018-09-13 PROCEDURE — 700117 HCHG RX CONTRAST REV CODE 255

## 2018-09-13 PROCEDURE — 700111 HCHG RX REV CODE 636 W/ 250 OVERRIDE (IP)

## 2018-09-13 PROCEDURE — 99153 MOD SED SAME PHYS/QHP EA: CPT

## 2018-09-13 PROCEDURE — 64490 INJ PARAVERT F JNT C/T 1 LEV: CPT

## 2018-09-13 PROCEDURE — 64491 INJ PARAVERT F JNT C/T 2 LEV: CPT

## 2018-09-13 PROCEDURE — 99152 MOD SED SAME PHYS/QHP 5/>YRS: CPT

## 2018-09-13 PROCEDURE — 700101 HCHG RX REV CODE 250

## 2018-09-13 RX ORDER — MIDAZOLAM HYDROCHLORIDE 1 MG/ML
INJECTION INTRAMUSCULAR; INTRAVENOUS
Status: COMPLETED
Start: 2018-09-13 | End: 2018-09-13

## 2018-09-13 RX ORDER — DEXAMETHASONE SODIUM PHOSPHATE 10 MG/ML
INJECTION, SOLUTION INTRAMUSCULAR; INTRAVENOUS
Status: COMPLETED
Start: 2018-09-13 | End: 2018-09-13

## 2018-09-13 RX ORDER — LIDOCAINE HYDROCHLORIDE 40 MG/ML
INJECTION, SOLUTION RETROBULBAR
Status: COMPLETED
Start: 2018-09-13 | End: 2018-09-13

## 2018-09-13 RX ORDER — OXYCODONE AND ACETAMINOPHEN 7.5; 325 MG/1; MG/1
1 TABLET ORAL EVERY 8 HOURS PRN
COMMUNITY
End: 2020-09-23

## 2018-09-13 RX ORDER — LIDOCAINE HYDROCHLORIDE 10 MG/ML
INJECTION, SOLUTION INFILTRATION; PERINEURAL
Status: COMPLETED
Start: 2018-09-13 | End: 2018-09-13

## 2018-09-13 RX ADMIN — LIDOCAINE HYDROCHLORIDE 30 ML: 10 INJECTION, SOLUTION INFILTRATION; PERINEURAL at 14:00

## 2018-09-13 RX ADMIN — IOHEXOL 3 ML: 240 INJECTION, SOLUTION INTRATHECAL; INTRAVASCULAR; INTRAVENOUS; ORAL at 14:00

## 2018-09-13 RX ADMIN — FENTANYL CITRATE 50 MCG: 50 INJECTION, SOLUTION INTRAMUSCULAR; INTRAVENOUS at 14:20

## 2018-09-13 RX ADMIN — DEXAMETHASONE SODIUM PHOSPHATE 10 MG: 10 INJECTION, SOLUTION INTRAMUSCULAR; INTRAVENOUS at 14:00

## 2018-09-13 RX ADMIN — MIDAZOLAM HYDROCHLORIDE 1 MG: 1 INJECTION, SOLUTION INTRAMUSCULAR; INTRAVENOUS at 14:20

## 2018-09-13 RX ADMIN — FENTANYL CITRATE 50 MCG: 50 INJECTION, SOLUTION INTRAMUSCULAR; INTRAVENOUS at 14:30

## 2018-09-13 ASSESSMENT — PAIN SCALES - GENERAL
PAINLEVEL_OUTOF10: 9
PAINLEVEL_OUTOF10: 5

## 2018-09-13 NOTE — PROGRESS NOTES
Med reconciliation completed. Pt has . Patient denies taking any blood thinners, antibiotics. Last dose ibuprofen 1.5 weeks ago.  Discharge instructions reviewed & copy given to patient. Pt denies having an advanced directive.

## 2018-09-20 ENCOUNTER — TELEPHONE (OUTPATIENT)
Dept: ENDOCRINOLOGY | Facility: MEDICAL CENTER | Age: 48
End: 2018-09-20

## 2018-09-20 NOTE — TELEPHONE ENCOUNTER
Pt called asking if we received labs from [facility]. Advised pt we dont have them. She will call them and have them fax most recent labs on her as well as her daughter who will also be seen here.

## 2018-09-24 ENCOUNTER — OFFICE VISIT (OUTPATIENT)
Dept: ENDOCRINOLOGY | Facility: MEDICAL CENTER | Age: 48
End: 2018-09-24
Payer: MEDICAID

## 2018-09-24 VITALS
BODY MASS INDEX: 39.49 KG/M2 | HEART RATE: 78 BPM | SYSTOLIC BLOOD PRESSURE: 115 MMHG | DIASTOLIC BLOOD PRESSURE: 80 MMHG | OXYGEN SATURATION: 100 % | WEIGHT: 237 LBS | HEIGHT: 65 IN

## 2018-09-24 DIAGNOSIS — C73 PAPILLARY THYROID CARCINOMA (HCC): ICD-10-CM

## 2018-09-24 DIAGNOSIS — E89.0 POSTSURGICAL HYPOTHYROIDISM: ICD-10-CM

## 2018-09-24 PROCEDURE — 99213 OFFICE O/P EST LOW 20 MIN: CPT | Performed by: INTERNAL MEDICINE

## 2018-09-24 RX ORDER — LEVOTHYROXINE SODIUM 137 UG/1
137 TABLET ORAL
Qty: 96 TAB | Refills: 3 | Status: SHIPPED | OUTPATIENT
Start: 2018-09-24 | End: 2019-03-29 | Stop reason: CLARIF

## 2018-09-24 NOTE — PROGRESS NOTES
"Endocrinology Clinic Progress Note    CC: Thyroid cancer    HPI:  Jayashree Carrasco is a 48 y.o. old patient who comes in today for routine follow up.  She is currently on levothyroxine 137 mcg daily.  Reports compliance with medications.  Recent thyroglobulin is undetectable and thyroglobulin antibodies undetectable.  Thyroid bed ultrasound in January 2018 did not show any worrisome findings.  Recent TSH is 2.2.    ROS:  Constitutional: Positive for fatigue    EXAM:  Vital signs: /80 (BP Location: Right arm, Patient Position: Sitting)   Pulse 78   Ht 1.651 m (5' 5\")   Wt 107.5 kg (237 lb)   LMP 11/21/2013   SpO2 100%   BMI 39.44 kg/m²   General: No apparent distress, cooperative  Eyes: No scleral icterus, no discharge  Resp: Normal effort  Extremities: No lower extremity edema  Psych: Alert and oriented, normal mood and affect    Assessment and Plan:    1. Papillary thyroid carcinoma (HCC)  · Status post total thyroidectomy in 1999 and radioactive iodine ablation in 1999 and 2001  · Recent thyroglobulin is 0.1, thyroglobulin antibody is negative, and thyroid bed ultrasound in January 2018 was unremarkable --she meets criteria for excellent response to therapy    2. Postsurgical hypothyroidism  · Recent TSH is 2.2, goal is to keep TSH in the range of 0.5-2.0  · Advised to take an extra half a tablet of levothyroxine on Sundays in addition to 137 mcg 1 tablet daily  · Repeat TSH and free T4 in 6 weeks  - TSH; Future  - FREE THYROXINE; Future  - levothyroxine (SYNTHROID) 137 MCG Tab; Take 1 Tab by mouth Every morning on an empty stomach. Plus an extra half a tab on Sundays  Dispense: 96 Tab; Refill: 3    Return in about 1 year (around 9/24/2019).    Thank you for allowing me to participate in the care of this patient.    Jerardo Storm M.D.    CC:   Shelby Bell M.D.    This note was created using voice recognition software (Dragon). The accuracy of the dictation is limited by the abilities of the " software. I have reviewed the note prior to signing, however some errors in grammar and context are still possible. If you have any questions related to this note please do not hesitate to contact our office.

## 2018-09-26 ENCOUNTER — TELEPHONE (OUTPATIENT)
Dept: ENDOCRINOLOGY | Facility: MEDICAL CENTER | Age: 48
End: 2018-09-26

## 2018-09-26 DIAGNOSIS — Z13.1 SCREENING FOR DIABETES MELLITUS (DM): ICD-10-CM

## 2018-10-18 ENCOUNTER — HOSPITAL ENCOUNTER (OUTPATIENT)
Facility: MEDICAL CENTER | Age: 48
End: 2018-10-19
Attending: EMERGENCY MEDICINE | Admitting: INTERNAL MEDICINE
Payer: MEDICAID

## 2018-10-18 ENCOUNTER — APPOINTMENT (OUTPATIENT)
Dept: RADIOLOGY | Facility: MEDICAL CENTER | Age: 48
End: 2018-10-18
Attending: EMERGENCY MEDICINE
Payer: MEDICAID

## 2018-10-18 PROBLEM — K22.2 SCHATZKI'S RING: Status: ACTIVE | Noted: 2018-10-18

## 2018-10-18 PROBLEM — R19.7 NAUSEA VOMITING AND DIARRHEA: Status: ACTIVE | Noted: 2018-10-18

## 2018-10-18 PROBLEM — R11.2 INTRACTABLE NAUSEA AND VOMITING: Status: ACTIVE | Noted: 2018-10-18

## 2018-10-18 LAB
ALBUMIN SERPL BCP-MCNC: 3.9 G/DL (ref 3.2–4.9)
ALBUMIN/GLOB SERPL: 1.2 G/DL
ALP SERPL-CCNC: 55 U/L (ref 30–99)
ALT SERPL-CCNC: 19 U/L (ref 2–50)
ANION GAP SERPL CALC-SCNC: 14 MMOL/L (ref 0–11.9)
AST SERPL-CCNC: 18 U/L (ref 12–45)
BASOPHILS # BLD AUTO: 0.4 % (ref 0–1.8)
BASOPHILS # BLD: 0.07 K/UL (ref 0–0.12)
BILIRUB SERPL-MCNC: 0.8 MG/DL (ref 0.1–1.5)
BUN SERPL-MCNC: 15 MG/DL (ref 8–22)
C DIFF DNA SPEC QL NAA+PROBE: NEGATIVE
C DIFF TOX GENS STL QL NAA+PROBE: NEGATIVE
C PNEUM DNA SPEC QL NAA+PROBE: NOT DETECTED
CALCIUM SERPL-MCNC: 8.9 MG/DL (ref 8.5–10.5)
CHLORIDE SERPL-SCNC: 106 MMOL/L (ref 96–112)
CO2 SERPL-SCNC: 20 MMOL/L (ref 20–33)
CREAT SERPL-MCNC: 0.77 MG/DL (ref 0.5–1.4)
EKG IMPRESSION: NORMAL
EOSINOPHIL # BLD AUTO: 0.05 K/UL (ref 0–0.51)
EOSINOPHIL NFR BLD: 0.3 % (ref 0–6.9)
ERYTHROCYTE [DISTWIDTH] IN BLOOD BY AUTOMATED COUNT: 45.8 FL (ref 35.9–50)
FLUAV H1 2009 PAND RNA SPEC QL NAA+PROBE: NOT DETECTED
FLUAV H1 RNA SPEC QL NAA+PROBE: NOT DETECTED
FLUAV H3 RNA SPEC QL NAA+PROBE: NOT DETECTED
FLUAV RNA SPEC QL NAA+PROBE: NOT DETECTED
FLUBV RNA SPEC QL NAA+PROBE: NOT DETECTED
GLOBULIN SER CALC-MCNC: 3.2 G/DL (ref 1.9–3.5)
GLUCOSE SERPL-MCNC: 135 MG/DL (ref 65–99)
HADV DNA SPEC QL NAA+PROBE: NOT DETECTED
HCOV RNA SPEC QL NAA+PROBE: NOT DETECTED
HCT VFR BLD AUTO: 44.4 % (ref 37–47)
HGB BLD-MCNC: 14.5 G/DL (ref 12–16)
HMPV RNA SPEC QL NAA+PROBE: NOT DETECTED
HPIV1 RNA SPEC QL NAA+PROBE: NOT DETECTED
HPIV2 RNA SPEC QL NAA+PROBE: NOT DETECTED
HPIV3 RNA SPEC QL NAA+PROBE: NOT DETECTED
HPIV4 RNA SPEC QL NAA+PROBE: NOT DETECTED
IMM GRANULOCYTES # BLD AUTO: 0.08 K/UL (ref 0–0.11)
IMM GRANULOCYTES NFR BLD AUTO: 0.5 % (ref 0–0.9)
LIPASE SERPL-CCNC: 7 U/L (ref 11–82)
LYMPHOCYTES # BLD AUTO: 0.57 K/UL (ref 1–4.8)
LYMPHOCYTES NFR BLD: 3.4 % (ref 22–41)
M PNEUMO DNA SPEC QL NAA+PROBE: NOT DETECTED
MAGNESIUM SERPL-MCNC: 1.7 MG/DL (ref 1.5–2.5)
MCH RBC QN AUTO: 29.8 PG (ref 27–33)
MCHC RBC AUTO-ENTMCNC: 32.7 G/DL (ref 33.6–35)
MCV RBC AUTO: 91.2 FL (ref 81.4–97.8)
MONOCYTES # BLD AUTO: 0.96 K/UL (ref 0–0.85)
MONOCYTES NFR BLD AUTO: 5.7 % (ref 0–13.4)
NEUTROPHILS # BLD AUTO: 15.14 K/UL (ref 2–7.15)
NEUTROPHILS NFR BLD: 89.7 % (ref 44–72)
NRBC # BLD AUTO: 0 K/UL
NRBC BLD-RTO: 0 /100 WBC
PLATELET # BLD AUTO: 265 K/UL (ref 164–446)
PMV BLD AUTO: 10.3 FL (ref 9–12.9)
POTASSIUM SERPL-SCNC: 4 MMOL/L (ref 3.6–5.5)
PROT SERPL-MCNC: 7.1 G/DL (ref 6–8.2)
RBC # BLD AUTO: 4.87 M/UL (ref 4.2–5.4)
RSV A RNA SPEC QL NAA+PROBE: NOT DETECTED
RSV B RNA SPEC QL NAA+PROBE: NOT DETECTED
RV+EV RNA SPEC QL NAA+PROBE: NOT DETECTED
SODIUM SERPL-SCNC: 140 MMOL/L (ref 135–145)
TROPONIN I SERPL-MCNC: <0.01 NG/ML (ref 0–0.04)
WBC # BLD AUTO: 16.9 K/UL (ref 4.8–10.8)

## 2018-10-18 PROCEDURE — 96376 TX/PRO/DX INJ SAME DRUG ADON: CPT | Mod: XU

## 2018-10-18 PROCEDURE — 96375 TX/PRO/DX INJ NEW DRUG ADDON: CPT | Mod: XU

## 2018-10-18 PROCEDURE — G0378 HOSPITAL OBSERVATION PER HR: HCPCS

## 2018-10-18 PROCEDURE — 87581 M.PNEUMON DNA AMP PROBE: CPT

## 2018-10-18 PROCEDURE — 99220 PR INITIAL OBSERVATION CARE,LEVL III: CPT | Mod: GC | Performed by: INTERNAL MEDICINE

## 2018-10-18 PROCEDURE — 700105 HCHG RX REV CODE 258: Performed by: INTERNAL MEDICINE

## 2018-10-18 PROCEDURE — 93005 ELECTROCARDIOGRAM TRACING: CPT | Performed by: INTERNAL MEDICINE

## 2018-10-18 PROCEDURE — 87633 RESP VIRUS 12-25 TARGETS: CPT

## 2018-10-18 PROCEDURE — 700111 HCHG RX REV CODE 636 W/ 250 OVERRIDE (IP): Performed by: PHYSICAL MEDICINE & REHABILITATION

## 2018-10-18 PROCEDURE — 87486 CHLMYD PNEUM DNA AMP PROBE: CPT

## 2018-10-18 PROCEDURE — 85025 COMPLETE CBC W/AUTO DIFF WBC: CPT

## 2018-10-18 PROCEDURE — 87899 AGENT NOS ASSAY W/OPTIC: CPT

## 2018-10-18 PROCEDURE — 96365 THER/PROPH/DIAG IV INF INIT: CPT | Mod: XU

## 2018-10-18 PROCEDURE — 87045 FECES CULTURE AEROBIC BACT: CPT

## 2018-10-18 PROCEDURE — 80053 COMPREHEN METABOLIC PANEL: CPT

## 2018-10-18 PROCEDURE — 83690 ASSAY OF LIPASE: CPT

## 2018-10-18 PROCEDURE — 700102 HCHG RX REV CODE 250 W/ 637 OVERRIDE(OP): Performed by: PHYSICAL MEDICINE & REHABILITATION

## 2018-10-18 PROCEDURE — 96374 THER/PROPH/DIAG INJ IV PUSH: CPT

## 2018-10-18 PROCEDURE — 87046 STOOL CULTR AEROBIC BACT EA: CPT

## 2018-10-18 PROCEDURE — 71260 CT THORAX DX C+: CPT

## 2018-10-18 PROCEDURE — 96366 THER/PROPH/DIAG IV INF ADDON: CPT

## 2018-10-18 PROCEDURE — 99285 EMERGENCY DEPT VISIT HI MDM: CPT

## 2018-10-18 PROCEDURE — 36415 COLL VENOUS BLD VENIPUNCTURE: CPT

## 2018-10-18 PROCEDURE — C9113 INJ PANTOPRAZOLE SODIUM, VIA: HCPCS | Performed by: INTERNAL MEDICINE

## 2018-10-18 PROCEDURE — 84484 ASSAY OF TROPONIN QUANT: CPT

## 2018-10-18 PROCEDURE — 87493 C DIFF AMPLIFIED PROBE: CPT

## 2018-10-18 PROCEDURE — 700111 HCHG RX REV CODE 636 W/ 250 OVERRIDE (IP): Performed by: EMERGENCY MEDICINE

## 2018-10-18 PROCEDURE — 83735 ASSAY OF MAGNESIUM: CPT

## 2018-10-18 PROCEDURE — 700111 HCHG RX REV CODE 636 W/ 250 OVERRIDE (IP): Performed by: INTERNAL MEDICINE

## 2018-10-18 PROCEDURE — 93005 ELECTROCARDIOGRAM TRACING: CPT | Performed by: EMERGENCY MEDICINE

## 2018-10-18 PROCEDURE — A9270 NON-COVERED ITEM OR SERVICE: HCPCS | Performed by: PHYSICAL MEDICINE & REHABILITATION

## 2018-10-18 PROCEDURE — 700111 HCHG RX REV CODE 636 W/ 250 OVERRIDE (IP)

## 2018-10-18 PROCEDURE — 700117 HCHG RX CONTRAST REV CODE 255: Performed by: EMERGENCY MEDICINE

## 2018-10-18 PROCEDURE — 700101 HCHG RX REV CODE 250: Performed by: PHYSICAL MEDICINE & REHABILITATION

## 2018-10-18 PROCEDURE — 96372 THER/PROPH/DIAG INJ SC/IM: CPT | Mod: XU

## 2018-10-18 RX ORDER — BISACODYL 10 MG
10 SUPPOSITORY, RECTAL RECTAL
Status: DISCONTINUED | OUTPATIENT
Start: 2018-10-18 | End: 2018-10-18

## 2018-10-18 RX ORDER — SODIUM CHLORIDE AND POTASSIUM CHLORIDE 150; 900 MG/100ML; MG/100ML
INJECTION, SOLUTION INTRAVENOUS CONTINUOUS
Status: DISCONTINUED | OUTPATIENT
Start: 2018-10-18 | End: 2018-10-19

## 2018-10-18 RX ORDER — LEVOTHYROXINE SODIUM 137 UG/1
137 TABLET ORAL DAILY
Status: DISCONTINUED | OUTPATIENT
Start: 2018-10-18 | End: 2018-10-19 | Stop reason: HOSPADM

## 2018-10-18 RX ORDER — ERGOCALCIFEROL 1.25 MG/1
50000 CAPSULE ORAL
Status: DISCONTINUED | OUTPATIENT
Start: 2018-10-20 | End: 2018-10-19 | Stop reason: HOSPADM

## 2018-10-18 RX ORDER — POLYETHYLENE GLYCOL 3350 17 G/17G
1 POWDER, FOR SOLUTION ORAL
Status: DISCONTINUED | OUTPATIENT
Start: 2018-10-18 | End: 2018-10-18

## 2018-10-18 RX ORDER — HYDROMORPHONE HYDROCHLORIDE 2 MG/ML
1 INJECTION, SOLUTION INTRAMUSCULAR; INTRAVENOUS; SUBCUTANEOUS ONCE
Status: COMPLETED | OUTPATIENT
Start: 2018-10-18 | End: 2018-10-18

## 2018-10-18 RX ORDER — LABETALOL HYDROCHLORIDE 5 MG/ML
10 INJECTION, SOLUTION INTRAVENOUS EVERY 4 HOURS PRN
Status: DISCONTINUED | OUTPATIENT
Start: 2018-10-18 | End: 2018-10-19 | Stop reason: HOSPADM

## 2018-10-18 RX ORDER — HYDROMORPHONE HYDROCHLORIDE 2 MG/ML
INJECTION, SOLUTION INTRAMUSCULAR; INTRAVENOUS; SUBCUTANEOUS
Status: COMPLETED
Start: 2018-10-18 | End: 2018-10-18

## 2018-10-18 RX ORDER — LORAZEPAM 2 MG/ML
0.25 INJECTION INTRAMUSCULAR EVERY 4 HOURS PRN
Status: DISCONTINUED | OUTPATIENT
Start: 2018-10-18 | End: 2018-10-19 | Stop reason: HOSPADM

## 2018-10-18 RX ORDER — MORPHINE SULFATE 4 MG/ML
4 INJECTION, SOLUTION INTRAMUSCULAR; INTRAVENOUS ONCE
Status: COMPLETED | OUTPATIENT
Start: 2018-10-18 | End: 2018-10-18

## 2018-10-18 RX ORDER — PHOSPHORATED CARBOHYDRATE 1.87; 21.5; 1.87 G/5ML; MG/5ML; G/5ML
15 SOLUTION ORAL ONCE
Status: DISCONTINUED | OUTPATIENT
Start: 2018-10-18 | End: 2018-10-18

## 2018-10-18 RX ORDER — AMOXICILLIN 250 MG
2 CAPSULE ORAL 2 TIMES DAILY PRN
Status: DISCONTINUED | OUTPATIENT
Start: 2018-10-18 | End: 2018-10-18

## 2018-10-18 RX ORDER — SODIUM CHLORIDE 9 MG/ML
INJECTION, SOLUTION INTRAVENOUS CONTINUOUS
Status: DISCONTINUED | OUTPATIENT
Start: 2018-10-18 | End: 2018-10-18

## 2018-10-18 RX ORDER — ONDANSETRON 2 MG/ML
4 INJECTION INTRAMUSCULAR; INTRAVENOUS EVERY 4 HOURS PRN
Status: DISCONTINUED | OUTPATIENT
Start: 2018-10-18 | End: 2018-10-18

## 2018-10-18 RX ORDER — TIZANIDINE HYDROCHLORIDE 6 MG/1
6 CAPSULE, GELATIN COATED ORAL 2 TIMES DAILY PRN
Status: SHIPPED | COMMUNITY
End: 2021-10-27

## 2018-10-18 RX ORDER — PANTOPRAZOLE SODIUM 40 MG/10ML
40 INJECTION, POWDER, LYOPHILIZED, FOR SOLUTION INTRAVENOUS DAILY
Status: DISCONTINUED | OUTPATIENT
Start: 2018-10-18 | End: 2018-10-19

## 2018-10-18 RX ORDER — ALBUTEROL SULFATE 90 UG/1
2 AEROSOL, METERED RESPIRATORY (INHALATION) EVERY 6 HOURS PRN
Status: DISCONTINUED | OUTPATIENT
Start: 2018-10-18 | End: 2018-10-19 | Stop reason: HOSPADM

## 2018-10-18 RX ORDER — SUCRALFATE 1 G/1
1 TABLET ORAL 3 TIMES DAILY
Refills: 1 | COMMUNITY
Start: 2018-07-23 | End: 2019-03-29 | Stop reason: CLARIF

## 2018-10-18 RX ORDER — ONDANSETRON 2 MG/ML
6 INJECTION INTRAMUSCULAR; INTRAVENOUS EVERY 4 HOURS PRN
Status: ACTIVE | OUTPATIENT
Start: 2018-10-18 | End: 2018-10-18

## 2018-10-18 RX ORDER — ONDANSETRON 2 MG/ML
4 INJECTION INTRAMUSCULAR; INTRAVENOUS ONCE
Status: COMPLETED | OUTPATIENT
Start: 2018-10-18 | End: 2018-10-18

## 2018-10-18 RX ORDER — HYDROMORPHONE HYDROCHLORIDE 2 MG/ML
0.5 INJECTION, SOLUTION INTRAMUSCULAR; INTRAVENOUS; SUBCUTANEOUS
Status: DISCONTINUED | OUTPATIENT
Start: 2018-10-18 | End: 2018-10-19 | Stop reason: HOSPADM

## 2018-10-18 RX ORDER — IPRATROPIUM BROMIDE AND ALBUTEROL SULFATE 2.5; .5 MG/3ML; MG/3ML
3 SOLUTION RESPIRATORY (INHALATION)
Status: DISCONTINUED | OUTPATIENT
Start: 2018-10-18 | End: 2018-10-19 | Stop reason: HOSPADM

## 2018-10-18 RX ORDER — SUCRALFATE 1 G/1
1 TABLET ORAL 3 TIMES DAILY
Status: DISCONTINUED | OUTPATIENT
Start: 2018-10-18 | End: 2018-10-19 | Stop reason: HOSPADM

## 2018-10-18 RX ORDER — MORPHINE SULFATE 4 MG/ML
4 INJECTION, SOLUTION INTRAMUSCULAR; INTRAVENOUS EVERY 4 HOURS PRN
Status: DISCONTINUED | OUTPATIENT
Start: 2018-10-18 | End: 2018-10-18

## 2018-10-18 RX ADMIN — LEVOTHYROXINE SODIUM 137 MCG: 137 TABLET ORAL at 16:04

## 2018-10-18 RX ADMIN — PROCHLORPERAZINE EDISYLATE 10 MG: 5 INJECTION INTRAMUSCULAR; INTRAVENOUS at 10:15

## 2018-10-18 RX ADMIN — LIDOCAINE HYDROCHLORIDE 15 ML: 20 SOLUTION OROPHARYNGEAL at 10:30

## 2018-10-18 RX ADMIN — ALBUTEROL SULFATE 2 PUFF: 90 AEROSOL, METERED RESPIRATORY (INHALATION) at 18:54

## 2018-10-18 RX ADMIN — LORAZEPAM 0.25 MG: 2 INJECTION INTRAMUSCULAR; INTRAVENOUS at 13:18

## 2018-10-18 RX ADMIN — POTASSIUM CHLORIDE AND SODIUM CHLORIDE: 900; 150 INJECTION, SOLUTION INTRAVENOUS at 17:38

## 2018-10-18 RX ADMIN — PANTOPRAZOLE SODIUM 40 MG: 40 INJECTION, POWDER, LYOPHILIZED, FOR SOLUTION INTRAVENOUS at 08:40

## 2018-10-18 RX ADMIN — HYDROMORPHONE HYDROCHLORIDE 1 MG: 2 INJECTION INTRAMUSCULAR; INTRAVENOUS; SUBCUTANEOUS at 06:30

## 2018-10-18 RX ADMIN — PROCHLORPERAZINE EDISYLATE 10 MG: 5 INJECTION INTRAMUSCULAR; INTRAVENOUS at 19:37

## 2018-10-18 RX ADMIN — MORPHINE SULFATE 4 MG: 4 INJECTION INTRAVENOUS at 05:12

## 2018-10-18 RX ADMIN — IOHEXOL 25 ML: 240 INJECTION, SOLUTION INTRATHECAL; INTRAVASCULAR; INTRAVENOUS; ORAL at 06:02

## 2018-10-18 RX ADMIN — HYDROMORPHONE HYDROCHLORIDE 0.5 MG: 2 INJECTION INTRAMUSCULAR; INTRAVENOUS; SUBCUTANEOUS at 19:37

## 2018-10-18 RX ADMIN — ONDANSETRON 4 MG: 2 INJECTION INTRAMUSCULAR; INTRAVENOUS at 07:55

## 2018-10-18 RX ADMIN — SUCRALFATE 1 G: 1 TABLET ORAL at 16:04

## 2018-10-18 RX ADMIN — IOHEXOL 80 ML: 350 INJECTION, SOLUTION INTRAVENOUS at 06:03

## 2018-10-18 RX ADMIN — LORAZEPAM 0.25 MG: 2 INJECTION INTRAMUSCULAR; INTRAVENOUS at 22:09

## 2018-10-18 RX ADMIN — HYDROMORPHONE HYDROCHLORIDE 1 MG: 2 INJECTION INTRAMUSCULAR; INTRAVENOUS; SUBCUTANEOUS at 10:16

## 2018-10-18 RX ADMIN — ENOXAPARIN SODIUM 40 MG: 100 INJECTION SUBCUTANEOUS at 16:04

## 2018-10-18 RX ADMIN — SODIUM CHLORIDE: 9 INJECTION, SOLUTION INTRAVENOUS at 07:57

## 2018-10-18 RX ADMIN — ONDANSETRON 4 MG: 2 INJECTION INTRAMUSCULAR; INTRAVENOUS at 04:56

## 2018-10-18 RX ADMIN — HYDROMORPHONE HYDROCHLORIDE 0.5 MG: 2 INJECTION INTRAMUSCULAR; INTRAVENOUS; SUBCUTANEOUS at 13:19

## 2018-10-18 RX ADMIN — HYDROMORPHONE HYDROCHLORIDE 1 MG: 2 INJECTION, SOLUTION INTRAMUSCULAR; INTRAVENOUS; SUBCUTANEOUS at 10:16

## 2018-10-18 ASSESSMENT — COPD QUESTIONNAIRES
HAVE YOU SMOKED AT LEAST 100 CIGARETTES IN YOUR ENTIRE LIFE: NO/DON'T KNOW
DURING THE PAST 4 WEEKS HOW MUCH DID YOU FEEL SHORT OF BREATH: NONE/LITTLE OF THE TIME
DO YOU EVER COUGH UP ANY MUCUS OR PHLEGM?: NO/ONLY WITH OCCASIONAL COLDS OR INFECTIONS
HAVE YOU SMOKED AT LEAST 100 CIGARETTES IN YOUR ENTIRE LIFE: NO/DON'T KNOW
DO YOU EVER COUGH UP ANY MUCUS OR PHLEGM?: NO/ONLY WITH OCCASIONAL COLDS OR INFECTIONS
IN THE PAST 12 MONTHS DO YOU DO LESS THAN YOU USED TO BECAUSE OF YOUR BREATHING PROBLEMS: DISAGREE/UNSURE
COPD SCREENING SCORE: 0
DURING THE PAST 4 WEEKS HOW MUCH DID YOU FEEL SHORT OF BREATH: NONE/LITTLE OF THE TIME
COPD SCREENING SCORE: 0

## 2018-10-18 ASSESSMENT — PATIENT HEALTH QUESTIONNAIRE - PHQ9
2. FEELING DOWN, DEPRESSED, IRRITABLE, OR HOPELESS: NOT AT ALL
2. FEELING DOWN, DEPRESSED, IRRITABLE, OR HOPELESS: NOT AT ALL
1. LITTLE INTEREST OR PLEASURE IN DOING THINGS: NOT AT ALL
SUM OF ALL RESPONSES TO PHQ9 QUESTIONS 1 AND 2: 0
1. LITTLE INTEREST OR PLEASURE IN DOING THINGS: NOT AT ALL
2. FEELING DOWN, DEPRESSED, IRRITABLE, OR HOPELESS: NOT AT ALL
SUM OF ALL RESPONSES TO PHQ9 QUESTIONS 1 AND 2: 0
1. LITTLE INTEREST OR PLEASURE IN DOING THINGS: NOT AT ALL
SUM OF ALL RESPONSES TO PHQ9 QUESTIONS 1 AND 2: 0

## 2018-10-18 ASSESSMENT — ENCOUNTER SYMPTOMS
SHORTNESS OF BREATH: 0
MYALGIAS: 0
WHEEZING: 0
CONSTIPATION: 0
TREMORS: 0
HEARTBURN: 1
LOSS OF CONSCIOUSNESS: 0
BLOOD IN STOOL: 0
NAUSEA: 1
BLURRED VISION: 0
DIARRHEA: 1
COUGH: 0
SEIZURES: 0
ABDOMINAL PAIN: 1
CLAUDICATION: 0
SPUTUM PRODUCTION: 0
FALLS: 0
SPEECH CHANGE: 0
DIZZINESS: 0
SENSORY CHANGE: 0
HEADACHES: 0
PHOTOPHOBIA: 0
CHILLS: 0
HEMOPTYSIS: 0
DOUBLE VISION: 0
FOCAL WEAKNESS: 0
ORTHOPNEA: 0
PALPITATIONS: 0
SORE THROAT: 0
VOMITING: 1
FEVER: 0
DIAPHORESIS: 0

## 2018-10-18 ASSESSMENT — PAIN SCALES - GENERAL
PAINLEVEL_OUTOF10: 9
PAINLEVEL_OUTOF10: 2
PAINLEVEL_OUTOF10: 0
PAINLEVEL_OUTOF10: 1
PAINLEVEL_OUTOF10: 2

## 2018-10-18 ASSESSMENT — LIFESTYLE VARIABLES
ALCOHOL_USE: NO
EVER_SMOKED: NEVER
EVER_SMOKED: NEVER
SUBSTANCE_ABUSE: 0

## 2018-10-18 NOTE — ED NOTES
Med Rec completed per patient  Allergies reviewed    Patient completed a Cefdinir RX last month

## 2018-10-18 NOTE — ED TRIAGE NOTES
"Chief Complaint   Patient presents with   • Nausea/Vomiting/Diarrhea     Sudden onset tonight at ~0000. No recent abx.   • Abdominal Pain     Sharp, stabbing RUQ abdominal pain.       /85   Pulse 86   Temp 36.4 °C (97.5 °F)   Resp (!) 24   Ht 1.626 m (5' 4\")   Wt 108.9 kg (240 lb)   LMP 11/21/2013   SpO2 97%   BMI 41.20 kg/m²     47 y/o female, two days s/p endoscopy, colonoscopy and esophageal dilation at [facility], here via EMS for sudden onset of N/V/D at ~0000 this noc.  Patient states that she also has sharp, RUQ abd pain and epigastric pain, however N/V/D began first. +chills, no fever on arrival to ED.  Patient actively vomiting on arrival.   "

## 2018-10-18 NOTE — CONSULTS
DATE OF SERVICE:  10/18/2018    REFERRING PHYSICIAN:  Kemar Ta MD    CHIEF COMPLAINT:  Abdominal pain.    HISTORY OF PRESENT ILLNESS:  This patient is a 48-year-old female who has a   history of recurrent dysphagia due to Schatzki's ring and has a history of   gastroesophageal reflux disease and is on chronic PPI therapy.  Two days prior   to admission, she had an upper endoscopy with dilatation as well as a   colonoscopy performed.  This was done with propofol.  Prior to this, she had a   bad experience with an asthma attack during a procedure, where she was given   propofol and they evidently elected to place her in a deeper level of sedation   this time.  About 30 hours after the procedure, she developed problems with   significant epigastric abdominal pain, which then radiated into her right   upper quadrant and was severe and associated with nausea and vomiting with no   blood in her vomit.  She had multiple loose watery bowel movements.  She has a   history of fibromyalgia.  There was no blood noted in either her stool or her   emesis.  The pain was severe enough, she sought attention in the emergency   room.    ALLERGIES:  1.  PROPOFOL  2.  CIPRO.  3.  SULFA.    MEDICATIONS:  As an outpatient, she is on:  1.  Proventil inhaler.  2.  Synthroid.  3.  Oxycodone.  4.  Protonix.  5.  Carafate.    PAST MEDICAL HISTORY:  1.  Gastroesophageal reflux disease.  2.  Recurrent Schatzki's rings, requiring dilatation every 3 years.  3.  Thyroid cancer with resultant thyroidectomy.  4.  Chronic back pain for which she is on opiates.  5.  Hysterectomy.  6.  Cholecystectomy.  7.  Fatty liver.  8.  Asthma.  9.  Fibromyalgia.    SOCIAL HISTORY:  She does not smoke or drink alcohol.    FAMILY HISTORY:  Noncontributory.    REVIEW OF SYSTEMS:  Rheumatologic, has chronic back pain and other than her GI   symptoms and please see the HPI for that 13-point review of systems is   negative.    PHYSICAL EXAMINATION:  VITAL  SIGNS:  Blood pressure 118/85, pulse 86, respiratory rate is 18 and   temperature is afebrile.  SKIN:  No stigmata of chronic liver disease.  HEENT:  Head normocephalic.  Eyes are nonicteric.  NECK:  Supple.  LYMPH NODES:  Negative supraclavicular, cervical and axillary.  HEART:  Regular rate and rhythm.  LUNGS:  Clear to auscultation and percussion.  ABDOMEN:  Normoactive bowel sounds.  No masses or organomegaly noted.  She has   moderate tenderness to palpation in the right upper quadrant, but until I   pressed on the patient's abdomen, she was quite comfortable.  EXTREMITIES:  Normal.  NEUROLOGIC:  Normal.  PSYCHIATRIC:  Normal.    LABORATORY DATA:  Hematocrit 44.4, platelet count 265,000, white blood cell   count 16.9, sodium 140, potassium 4.0, creatinine 0.77.  Liver function tests   are normal.  Lipase is 7.    IMAGING:  A CT scan of the abdomen and pelvis shows a thickened distal   esophageal wall, but no evident complication such as a perforation etc., from   her procedure.    IMPRESSION AND RECOMMENDATIONS:  1.  Abdominal pain in the right upper quadrant.  This could be a reaction or   dilatation, but she has had 2 episodes recently when she was given propofol,   one which induced an asthma attack and the symptom she is having are certainly   atypical and I ____ wonder if a component of what we are seeing could be a   manifestation of another drug reaction for propofol and I have recommended the   patient never use this medication again.  2.  Nausea and vomiting, wonder about a drug reaction, there is nothing of   significance on her CT scan, she is feeling better now and I would observe   her.  3.  Diarrhea.  This has eased up as well and we will follow this.  4.  Dysphagia, for which she had a recent dilatation with no evidence of   significant complications such as a perforation etc.  The patient is feeling   better.  I would recommend that when she improves, she started on a clear   liquid diet.  We  will actively follow the patient while she is here in the   hospital.  If you have any problems, please contact us.  I recommended she   never take propofol again.  5.  Gastroesophageal reflux disease, on Protonix.  6.  Fatty liver.  7.  Fibromyalgia.       ____________________________________     Cassius Sutton MD    JGLORIA / JT    DD:  10/18/2018 14:13:58  DT:  10/18/2018 15:02:14    D#:  [id number]  Job#:  [id number]

## 2018-10-18 NOTE — ED PROVIDER NOTES
ED Provider Note    CHIEF COMPLAINT  Chief Complaint   Patient presents with   • Nausea/Vomiting/Diarrhea     Sudden onset tonight at ~0000. No recent abx.   • Abdominal Pain     Sharp, stabbing RUQ abdominal pain.       HPI  Jayashree Carrasco is a 48 y.o. female who presents with nausea vomiting diarrhea following endoscopy colonoscopy and esophageal dilation 10/17/2018 at [facility].  Patient reports that she was feeling well following the procedure but at around 2 AM this morning developed symptoms.  Abdominal pain is epigastric.  Patient denies any fevers.  Patient denies any bilious or bloody emesis.  Patient denies any hematochezia or melena.  Patient denies any chest pain.    REVIEW OF SYSTEMS  Review of Systems   Constitutional: Positive for malaise/fatigue. Negative for fever.   Eyes: Negative for photophobia.   Cardiovascular: Negative for chest pain.   Gastrointestinal: Positive for abdominal pain, diarrhea, nausea and vomiting. Negative for blood in stool, constipation and melena.   Genitourinary: Negative for dysuria and urgency.   Neurological: Negative for dizziness.     See HPI for further details. All other systems are negative.     PAST MEDICAL HISTORY   has a past medical history of ASTHMA; Cancer (HCC); Chronic low back pain (4/12/2014); Chronic neck pain (4/12/2014); Fibromyalgia; GERD (gastroesophageal reflux disease); Thyroid disease; and Unspecified vitamin D deficiency (4/12/2014).    SOCIAL HISTORY  Social History     Social History Main Topics   • Smoking status: Never Smoker   • Smokeless tobacco: Never Used   • Alcohol use No   • Drug use: No   • Sexual activity: Yes     Partners: Male     Birth control/ protection: Surgical       SURGICAL HISTORY   has a past surgical history that includes cholecystectomy; thyroidectomy; and abdominal hysterectomy total.    CURRENT MEDICATIONS  Home Medications    **Home medications have not yet been reviewed for this encounter**          ALLERGIES  Allergies   Allergen Reactions   • Propofol      Hypotension, and severe asthma attack   • Ciprofloxacin Rash     Rxn - about 2012   • Sulfa Drugs Rash     Rxn - about 2012       PHYSICAL EXAM  Physical Exam   Constitutional: She is oriented to person, place, and time. She appears well-developed and well-nourished.   HENT:   Head: Normocephalic.   Eyes: Pupils are equal, round, and reactive to light. Conjunctivae are normal.   Neck: Normal range of motion. Neck supple.   Cardiovascular: Normal rate and regular rhythm.    Pulmonary/Chest: Effort normal and breath sounds normal.   Abdominal: Soft. Bowel sounds are normal. She exhibits no distension. There is no rebound and no guarding.   Mild epigastric ttp   Neurological: She is alert and oriented to person, place, and time.   Skin: Skin is warm and dry. No rash noted.     EKG is normal sinus rhythm normal axis normal intervals no ST changes consistent with acute regional ischemia    Labs  Results for orders placed or performed during the hospital encounter of 10/18/18   CBC WITH DIFFERENTIAL   Result Value Ref Range    WBC 16.9 (H) 4.8 - 10.8 K/uL    RBC 4.87 4.20 - 5.40 M/uL    Hemoglobin 14.5 12.0 - 16.0 g/dL    Hematocrit 44.4 37.0 - 47.0 %    MCV 91.2 81.4 - 97.8 fL    MCH 29.8 27.0 - 33.0 pg    MCHC 32.7 (L) 33.6 - 35.0 g/dL    RDW 45.8 35.9 - 50.0 fL    Platelet Count 265 164 - 446 K/uL    MPV 10.3 9.0 - 12.9 fL    Neutrophils-Polys 89.70 (H) 44.00 - 72.00 %    Lymphocytes 3.40 (L) 22.00 - 41.00 %    Monocytes 5.70 0.00 - 13.40 %    Eosinophils 0.30 0.00 - 6.90 %    Basophils 0.40 0.00 - 1.80 %    Immature Granulocytes 0.50 0.00 - 0.90 %    Nucleated RBC 0.00 /100 WBC    Neutrophils (Absolute) 15.14 (H) 2.00 - 7.15 K/uL    Lymphs (Absolute) 0.57 (L) 1.00 - 4.80 K/uL    Monos (Absolute) 0.96 (H) 0.00 - 0.85 K/uL    Eos (Absolute) 0.05 0.00 - 0.51 K/uL    Baso (Absolute) 0.07 0.00 - 0.12 K/uL    Immature Granulocytes (abs) 0.08 0.00 - 0.11 K/uL     NRBC (Absolute) 0.00 K/uL   CMP   Result Value Ref Range    Sodium 140 135 - 145 mmol/L    Potassium 4.0 3.6 - 5.5 mmol/L    Chloride 106 96 - 112 mmol/L    Co2 20 20 - 33 mmol/L    Anion Gap 14.0 (H) 0.0 - 11.9    Glucose 135 (H) 65 - 99 mg/dL    Bun 15 8 - 22 mg/dL    Creatinine 0.77 0.50 - 1.40 mg/dL    Calcium 8.9 8.5 - 10.5 mg/dL    AST(SGOT) 18 12 - 45 U/L    ALT(SGPT) 19 2 - 50 U/L    Alkaline Phosphatase 55 30 - 99 U/L    Total Bilirubin 0.8 0.1 - 1.5 mg/dL    Albumin 3.9 3.2 - 4.9 g/dL    Total Protein 7.1 6.0 - 8.2 g/dL    Globulin 3.2 1.9 - 3.5 g/dL    A-G Ratio 1.2 g/dL   LIPASE   Result Value Ref Range    Lipase 7 (L) 11 - 82 U/L   ESTIMATED GFR   Result Value Ref Range    GFR If African American >60 >60 mL/min/1.73 m 2    GFR If Non African American >60 >60 mL/min/1.73 m 2     Wet Read  CT-CHEST,ABDOMEN,PELVIS WITH   Final Result         1.  Thickening of the distal esophageal wall, given history of esophageal dilatation likely corresponds with area of stenosis. No visualized extraluminal contrast or air to suggest perforation.   2.  Bilateral ovarian cysts, consider follow-up with pelvic sonogram for further characterization as clinically appropriate.   3.  Hepatomegaly            COURSE & MEDICAL DECISION MAKING  Pertinent Labs & Imaging studies reviewed. (See chart for details)  Patient here with abdominal pain and signs of possible gastroenteritis following endoscopy.  Given the associated abdominal pain patient will need evaluation for possible perforation or procedural complication otherwise.  Stool samples have been sent including C. difficile given patient's recent hospitalization over this is highly unlikely the cause of patient's symptoms given the onset was so close to the hospitalization time.  Patient will be given analgesics and antiemetics.  Patient will receive CT chest abdomen pelvis to evaluate for possible perforation, this will be given with IV and p.o. Contrast.  Patient with  considerable leukocytosis but normal laboratory results otherwise.  Patient CT fails to reveal any evidence of perforation or surgical pathology otherwise.  Patient continues to be nauseous plaintive abdominal pain.  This is likely nonsurgical esophageal or gastric pain secondary to esophagitis versus gastritis.  Patient's lipase is unremarkable.  Her EKG is unremarkable she has no other cardiopulmonary symptoms.  I have also sent a troponin though I believe ischemic heart disease very unlikely as the cause of patient's symptoms.  Patient will be admitted for ongoing pain control and inability to tolerate p.o.  I sent stool cultures including C. Difficile.  I have discussed the case with patient's gastroenterologist on call who will see patient in the emergency department or on the floor.  I discussed the case with [facility] who has accepted the case and will assume care.      FINAL IMPRESSION  1.  Postprocedural pain, esophagitis, gastritis, intractable pain, inability to tolerate po, diarrhea, leukocytosis         Electronically signed by: Henri Reinoso, 10/18/2018 5:01 AM

## 2018-10-18 NOTE — ASSESSMENT & PLAN NOTE
-Started 1.5 days after EGD w/ esophageal dilation and colonscopy. This has not happened after EGD's previously. Pt also ate out after procedure and had chicken salad sandwich on Tuesday afternoon. Timing is not congruent with most common presentation of S. Aureus food poisoning.   -Denies sore throat/cough/runny nose/muscle aches/fever; less likely influenza.  -No appendicitis noted on imaging and no rebound tenderness or McBurney's point tenderness.   -s/p hysterectomy; not pregnant   -Probable gastroenteritis  -WBC elevated w/ left shift.   -CT chest/abdomen/pelvis does not demonstrate any obstructions, inflammatory processes, or cholelithiasis.   -Troponins and lipase negative.   -Zofran, Compazine, Ativan, Dilaudid, IV fluids for sx management

## 2018-10-18 NOTE — ASSESSMENT & PLAN NOTE
20 yr hx of severe gerd   EGD 10/16 w/ dilation   Con't home Protonix  Con't home Carafate  GI cocktail as needed

## 2018-10-18 NOTE — ED NOTES
Assumed care of pt. Call light in reach. Bed in lowest position. Care of plan discussed with pt with pt agreeing to care of plan. Communication board updated. All questions answered. Assessment completed.

## 2018-10-18 NOTE — H&P
..        Internal Medicine Admitting History and Physical    Note Author: Floridalma Cobos M.D.       Name Jayashree Carrasco     1970   Age/Sex 48 y.o. female   MRN [id number]   Code Status Full     After 5PM or if no immediate response to page, please call for cross-coverage  Attending/Team: Dr. Wood/ODESSA Espana See Patient List for primary contact information  Call [phone] to page    1st Call - Day Intern (R1):   Dr. Cobos 2nd Call - Day Sr. Resident (R2/R3):   Dr. Iyer       Chief Complaint:   Intractable nausea, vomiting, and diarrhea    HPI:  Jayashree Carrasco is a pleasant 47 yo female with a PMH of severe GERD on Protonix 40 bid, schatzki's ring s/p dilation q3 years w/ most recent dilation two days ago, fatty liver, thyroid cancer s/p thyroidectomy and radiation in remission since , mild asthma w/ prn inhaler, and chronic back and neck pain receiving regular injections from [facility] and taking Percocet 7/325 tid, presenting two days after an EGD and colonoscopy for intractable nausea, vomiting, and diarrhea. On Tuesday, 10/16 pt had an EGD and colonoscopy to evaluate and treat her Schatzi's ring and to evaluate previous rectal bleeding. On Wednesday, 10/17, she felt nauseated and around midnight of 10/18 she started experiencing severe right-sided abdominal pain which is described as a dull baseline pain with intermittent sharp, stabbing pains that radiate from the epigastrium towards her right flank. She reports having simultaneous non-bloody vomiting and non-bloody diarrhea around the onset of the pain and has been unable to stop. She has had EGD's in the past and has never vomited after the procedure. She is s/p cholecystectomy and partial hysterectomy. Denies hx of cholelithiasis. Endorses frequent UTI's. Pt also ate out after her procedure and had a chicken salad sandwich.     Hx of Cefdinir use 1 mo ago  No hx of recent travel.   No recent sick contacts.   Denies sore  throat/cough/runny nose/muscle aches.     Denies MJ use/smoking/drinking    ED labs: WBC 16.9 w/ left shift;  AG 14; Lipase WNL;  electrolytes WNL; C. Diff negative. Troponins negative.     ED imaging: CT chest/ab/pelvis: thickening of distal esophageal wall, bilateral overian cysts, hepatomegaly. No free air in the mediastinum or abdomen. No cholelithiasis noted.     In ED, pt received:  IV Zofran 8mg, IV Morphine 4mg , Dilaudid 1mg, GI cocktail, IV Compazine 10mg, and IV Ativan 0.25mg and IV NS. Pt did *not* feel better after receiving zofran and morphine but she did experience relief from pain and vomiting with administration of Compazine and Dilaudid.         Review of Systems   Constitutional: Negative for chills, diaphoresis and fever.   HENT: Negative for congestion and sore throat.    Eyes: Negative for blurred vision, double vision and photophobia.   Respiratory: Negative for cough, hemoptysis, sputum production, shortness of breath and wheezing.    Cardiovascular: Positive for leg swelling. Negative for chest pain, palpitations, orthopnea and claudication.   Gastrointestinal: Positive for abdominal pain, diarrhea, heartburn, nausea and vomiting. Negative for blood in stool.   Genitourinary: Negative for dysuria.   Musculoskeletal: Negative for falls and myalgias.   Skin: Negative for rash.   Neurological: Negative for dizziness, tremors, sensory change, speech change, focal weakness, seizures, loss of consciousness and headaches.   Psychiatric/Behavioral: Negative for substance abuse.             Past Medical History (Chronic medical problem, known complications and current treatment)    ..  Past Medical History:   Diagnosis Date   • ASTHMA    • Cancer (HCC)     papillary thyroid, removed in 1999, recurrence 2000 treated with radiation   • Chronic low back pain 4/12/2014    Takes 1-2 perc 10/325 usu only 1-2 times/day Last took perc a week rx from doc in [place] ( [facility])    • Chronic neck pain  4/12/2014   • Fibromyalgia    • GERD (gastroesophageal reflux disease)     w/ esophageal dilation X3   • Thyroid disease    • Unspecified vitamin D deficiency 4/12/2014         Past Surgical History:  Past Surgical History:   Procedure Laterality Date   • ABDOMINAL HYSTERECTOMY TOTAL     • CHOLECYSTECTOMY     • THYROIDECTOMY         Current Outpatient Medications:  Home Medications     Reviewed by Neto Rey (Pharmacy Tech) on 10/18/18 at 0812  Med List Status: Complete   Medication Last Dose Status   albuterol (PROVENTIL) 2.5mg/3ml Nebu Soln solution for nebulization 10/17/2018 Active   albuterol 108 (90 Base) MCG/ACT Aero Soln inhalation aerosol 10/17/2018 Active   levothyroxine (SYNTHROID) 137 MCG Tab 10/17/2018 Active   ondansetron (ZOFRAN ODT) 4 MG TABLET DISPERSIBLE 10/17/2018 Active   oxyCODONE-acetaminophen (PERCOCET) 7.5-325 MG per tablet 10/17/2018 Active   pantoprazole (PROTONIX) 40 MG Tablet Delayed Response 10/17/2018 Active   sucralfate (CARAFATE) 1 GM Tab 10/17/2018 Active   tizanidine (ZANAFLEX) 6 MG capsule 10/17/2018 Active   vitamin D, Ergocalciferol, (DRISDOL) 08651 units Cap capsule 10/13/2018 Active                Medication Allergy/Sensitivities:  Allergies   Allergen Reactions   • Propofol      Hypotension, and severe asthma attack   • Ciprofloxacin Rash     Rxn - about 2012   • Sulfa Drugs Rash     Rxn - about 2012         Family History (mandatory)   Family History   Problem Relation Age of Onset   • Stroke Mother 40   • Cancer Mother         leukemia   • Cancer Maternal Aunt 72        breast   • Diabetes Maternal Grandfather    • Cancer Cousin 51        bone     Extensive family hx of multiple types of cancer.     Social History (mandatory)   Social History     Social History   • Marital status: Single     Spouse name: N/A   • Number of children: N/A   • Years of education: N/A     Occupational History   • Not on file.     Social History Main Topics   • Smoking status: Never  "Smoker   • Smokeless tobacco: Never Used   • Alcohol use No   • Drug use: No   • Sexual activity: Yes     Partners: Male     Birth control/ protection: Surgical     Other Topics Concern   • Not on file     Social History Narrative   • No narrative on file     Living situation: [place] w/ family   PCP : Shelby Bell M.D.    Physical Exam     Vitals:    10/18/18 0900 10/18/18 1030 10/18/18 1100 10/18/18 1200   BP:       Pulse:       Resp:  14 18    Temp:       SpO2: 98% 97% 94% 96%   Weight:       Height:         Body mass index is 41.2 kg/m².  /85   Pulse 86   Temp 36.4 °C (97.5 °F)   Resp 18   Ht 1.626 m (5' 4\")   Wt 108.9 kg (240 lb)   LMP 11/21/2013   SpO2 96%   BMI 41.20 kg/m²   O2 therapy: Pulse Oximetry: 96 %    Physical Exam   Constitutional: She is oriented to person, place, and time.   Actively vomiting on initial physical exam.    HENT:   Head: Normocephalic and atraumatic.   Eyes: Pupils are equal, round, and reactive to light. Conjunctivae and EOM are normal. No scleral icterus.   Neck:   Full range of motion    Cardiovascular: Normal rate, regular rhythm, normal heart sounds and intact distal pulses.  Exam reveals no gallop and no friction rub.    No murmur heard.  Pulmonary/Chest: Effort normal. No stridor. She has no wheezes. She has no rales.   Pt on 0.5L O2. Not on home oxygen. Pt desats to high 70's without the oxygen    Abdominal: Soft. She exhibits no distension and no mass. There is no tenderness. There is no rebound and no guarding.   decreaesd bowel sounds    Musculoskeletal: She exhibits edema. She exhibits no tenderness.   Bilateral lower extremity edema, chronic    Neurological: She is oriented to person, place, and time. No cranial nerve deficit.   Skin: She is not diaphoretic.         Data Review       Old Records Request:   Deferred  Current Records review/summary: Completed    Lab Data Review:  Recent Results (from the past 24 hour(s))   CBC WITH DIFFERENTIAL    " Collection Time: 10/18/18  4:36 AM   Result Value Ref Range    WBC 16.9 (H) 4.8 - 10.8 K/uL    RBC 4.87 4.20 - 5.40 M/uL    Hemoglobin 14.5 12.0 - 16.0 g/dL    Hematocrit 44.4 37.0 - 47.0 %    MCV 91.2 81.4 - 97.8 fL    MCH 29.8 27.0 - 33.0 pg    MCHC 32.7 (L) 33.6 - 35.0 g/dL    RDW 45.8 35.9 - 50.0 fL    Platelet Count 265 164 - 446 K/uL    MPV 10.3 9.0 - 12.9 fL    Neutrophils-Polys 89.70 (H) 44.00 - 72.00 %    Lymphocytes 3.40 (L) 22.00 - 41.00 %    Monocytes 5.70 0.00 - 13.40 %    Eosinophils 0.30 0.00 - 6.90 %    Basophils 0.40 0.00 - 1.80 %    Immature Granulocytes 0.50 0.00 - 0.90 %    Nucleated RBC 0.00 /100 WBC    Neutrophils (Absolute) 15.14 (H) 2.00 - 7.15 K/uL    Lymphs (Absolute) 0.57 (L) 1.00 - 4.80 K/uL    Monos (Absolute) 0.96 (H) 0.00 - 0.85 K/uL    Eos (Absolute) 0.05 0.00 - 0.51 K/uL    Baso (Absolute) 0.07 0.00 - 0.12 K/uL    Immature Granulocytes (abs) 0.08 0.00 - 0.11 K/uL    NRBC (Absolute) 0.00 K/uL   CMP    Collection Time: 10/18/18  4:36 AM   Result Value Ref Range    Sodium 140 135 - 145 mmol/L    Potassium 4.0 3.6 - 5.5 mmol/L    Chloride 106 96 - 112 mmol/L    Co2 20 20 - 33 mmol/L    Anion Gap 14.0 (H) 0.0 - 11.9    Glucose 135 (H) 65 - 99 mg/dL    Bun 15 8 - 22 mg/dL    Creatinine 0.77 0.50 - 1.40 mg/dL    Calcium 8.9 8.5 - 10.5 mg/dL    AST(SGOT) 18 12 - 45 U/L    ALT(SGPT) 19 2 - 50 U/L    Alkaline Phosphatase 55 30 - 99 U/L    Total Bilirubin 0.8 0.1 - 1.5 mg/dL    Albumin 3.9 3.2 - 4.9 g/dL    Total Protein 7.1 6.0 - 8.2 g/dL    Globulin 3.2 1.9 - 3.5 g/dL    A-G Ratio 1.2 g/dL   LIPASE    Collection Time: 10/18/18  4:36 AM   Result Value Ref Range    Lipase 7 (L) 11 - 82 U/L   C Diff by PCR rflx Toxin    Collection Time: 10/18/18  4:36 AM   Result Value Ref Range    C Diff by PCR Negative Negative    [id number] Presumptive Negative Negative   ESTIMATED GFR    Collection Time: 10/18/18  4:36 AM   Result Value Ref Range    GFR If African American >60 >60 mL/min/1.73 m 2    GFR If  Non African American >60 >60 mL/min/1.73 m 2   TROPONIN    Collection Time: 10/18/18  4:36 AM   Result Value Ref Range    Troponin I <0.01 0.00 - 0.04 ng/mL   Magnesium    Collection Time: 10/18/18  4:36 AM   Result Value Ref Range    Magnesium 1.7 1.5 - 2.5 mg/dL   PEDIATRIC RESPIRATORY PANEL BY PCR    Collection Time: 10/18/18 11:20 AM   Result Value Ref Range    Adenovirus, PCR Not Detected     Coronavirus, PCR Not Detected     Human Metapneumovirus, PCR Not Detected     Rhinovirus / Enterovirus, PCR Not Detected     Influenza virus A RNA Not Detected     Influenza virus A H1 RNA Not Detected     Influenza A 2009, H1N1, PCR Not Detected     Influenza virus A H3 RNA Not Detected     Influenza virus B, PCR Not Detected     Parainfluenza virus 1, PCR Not Detected     Parainfluenza virus 2, PCR Not Detected     Parainfluenza virus 3, PCR Not Detected     Parainfluenza 4, PCR Not Detected     Resp Syncytial Virus A, PCR Not Detected     Resp Syncytial Virus B, PCR Not Detected     Chlamydia pneumoniae, PCR Not Detected     Mycoplasma pneumoniae, PCR Not Detected        Imaging/Procedures Review:    Independant Imaging Review: Completed  CT-CHEST,ABDOMEN,PELVIS WITH   Final Result         1.  Thickening of the distal esophageal wall, given history of esophageal dilatation likely corresponds with area of stenosis. No visualized extraluminal contrast or air to suggest perforation.   2.  Bilateral ovarian cysts, consider follow-up with pelvic sonogram for further characterization as clinically appropriate.   3.  Hepatomegaly             Records reviewed and summarized in current documentation :  Yes  [employer] teaching service handout given to patient:  No         Assessment/Plan     Nausea vomiting and diarrhea   Assessment & Plan    -Started 1.5 days after EGD w/ esophageal dilation and colonscopy. This has not happened after EGD's previously. Pt also ate out after procedure and had chicken salad sandwich on Tuesday  afternoon. Timing is not congruent with most common presentation of S. Aureus food poisoning.   -Denies sore throat/cough/runny nose/muscle aches/fever; less likely influenza.  -No appendicitis noted on imaging and no rebound tenderness or McBurney's point tenderness.   -s/p hysterectomy; not pregnant   -Probable gastroenteritis  -WBC elevated w/ left shift.   -CT chest/abdomen/pelvis does not demonstrate any obstructions, inflammatory processes, or cholelithiasis.   -Troponins and lipase negative.   -Zofran, Compazine, Ativan, Dilaudid, IV fluids for sx management        Schatzki's ring   Assessment & Plan    Pt received pneumatic dilation 10/16        GERD (gastroesophageal reflux disease)- (present on admission)   Assessment & Plan    20 yr hx of severe gerd   EGD 10/16 w/ dilation   Con't home Protonix  Con't home Carafate  GI cocktail as needed        History of papillary adenocarcinoma of thyroid- (present on admission)   Assessment & Plan    S/p total thyroidectomy and radation. Cancer free since 2000.   Levothyroxine 0.137 bid        Mild intermittent asthma without complication- (present on admission)   Assessment & Plan    Duoneb        Vitamin D deficiency- (present on admission)   Assessment & Plan    50k units weekly         Chronic neck pain- (present on admission)   Assessment & Plan    Pt takes Percocet 7/325 and tizanidine 6mg bid at home and receives regular steroid injections         Chronic low back pain- (present on admission)   Assessment & Plan    Pt takes Percocet 7/325 and tizanidine 6mg bid at home and receives regular steroid injections         Hypothyroidism- (present on admission)   Assessment & Plan    Levothyroxine 0.137 bid            Anticipated Hospital stay:  >2 midnights        Quality Measures  Quality-Core Measures  PCP: Shelby Bell M.D.

## 2018-10-18 NOTE — SENIOR ADMIT NOTE
"  Chief complaint: abdominal pain and repeated vomiting.     History of present illness: 48 Y old lady with hx of chronic dysphagia presented with epigastric pain and repeated vomiting started this morning. The patient reports that she had EGD and colonoscopy done in [facility] 2 days ago by Dr. Burk and she got dilatation of the esophagus due to Schatzki's ring. Today around 12 AM, she developed severe epigastric pain, 10/10, stabbing pain, radiated to right hypochondrial area, continuous. The pain is associated with repeated vomiting, of food particles and non bilious, non bloody fluid. She reports associated diarrhea, many bowel movement of liquid stool. Denies any melena or hematochezia, no fever or chills, no SOB or chest pain.  The patient has chronic dysphagia due to Schatzki's ring and gets EGD with dilatation every 3 to 4 years.   The patient also had hx of rectal bleeding 4 months ago and managed conservatively in [facility].      Past medical history: dysphagia due to Schatzki's ring, asthma, GERD, thyroid cancer, S/P total thyroidectomy, cervical and lumbar DJD on percocet     Hx of hysterectomy, thyroidectomy for thyroid cancer, hx of cholecystectomy.    Medications:     Social and family history: no smoking hx, no alcohol drinking, no illicit drugs.      Physical Exam     /85   Pulse 86   Temp 36.4 °C (97.5 °F)   Resp 18   Ht 1.626 m (5' 4\")   Wt 108.9 kg (240 lb)   LMP 11/21/2013   SpO2 96%   BMI 41.20 kg/m²     Constitutional:   oriented to person, place, and time and well-developed, not in acute distress  Head: Normocephalic and atraumatic.    Mouth/Throat: Oropharynx is clear and moist. No oropharyngeal exudate.   Eyes: Conjunctivae and EOM are normal. Pupils are equal, round, and reactive to light.  No scleral icterus.   Neck: Normal range of motion. Neck supple. No JVD present. No tracheal deviation present. No thyromegaly present.   Cardiovascular: " Normal rate, regular rhythm, normal heart sounds and intact distal pulses.  Exam reveals no gallop and no friction rub.    No murmur heard.  Pulmonary/Chest: Effort normal and breath sounds normal. No stridor. No respiratory distress.  No wheezes, no rales. No tenderness  Abdominal: Soft, epigastric and right hypochondrial tenderness, + BS, no organomegaly.    Extremities: no leg edema, peripheral pulses are intact  Neurological:  alert and oriented to person, place, and time. Reflexes are normal. No cranial nerve deficit. normal muscle tone and power. GCS score is 15. sensation is intact  Skin: No rash noted. No erythema. No pallor.   Psychiatric: Mood, memory, affect and judgment normal.       Assessment and Plan    Repeated vomiting and upper abdominal pain     48 Y old lady with hx of chronic dysphagia due to Schatzki's ring, just had EGD with esophageal dilatation done 2 days ago presented with repeated vomiting and upper abdominal pain, and some diarrhea. Her vitals are stable on admission. Abdominal examination revealed soft abdomen with epigastric and right hypochondrial tenderness.  Her CT chest and abdomen did not show any perforation or obvious complications. C-diff test negative for for diarrhea.  Her labs revealed leukocytosis, white blood cells up to 16,000, chemistry panel was unremarkable apart from mild hyperglycemia.  Lipase is negative, troponin negative.  Although it is not perforation but non specific pain and vomiting can occurs following esophageal dilatation.    Plan:  - antiemetic with Zofran and compazine   - pain medication with dilaudid   - IV fluid  - PPI  - will contact GI       DVT prophylaxis: enoxaparin   Code status:  Full code

## 2018-10-19 VITALS
BODY MASS INDEX: 41.03 KG/M2 | OXYGEN SATURATION: 96 % | HEART RATE: 83 BPM | WEIGHT: 240.3 LBS | RESPIRATION RATE: 16 BRPM | HEIGHT: 64 IN | SYSTOLIC BLOOD PRESSURE: 110 MMHG | TEMPERATURE: 97.5 F | DIASTOLIC BLOOD PRESSURE: 67 MMHG

## 2018-10-19 LAB
ALBUMIN SERPL BCP-MCNC: 3.4 G/DL (ref 3.2–4.9)
ALBUMIN/GLOB SERPL: 1.4 G/DL
ALP SERPL-CCNC: 45 U/L (ref 30–99)
ALT SERPL-CCNC: 14 U/L (ref 2–50)
ANION GAP SERPL CALC-SCNC: 7 MMOL/L (ref 0–11.9)
AST SERPL-CCNC: 12 U/L (ref 12–45)
BASOPHILS # BLD AUTO: 0.3 % (ref 0–1.8)
BASOPHILS # BLD: 0.02 K/UL (ref 0–0.12)
BILIRUB SERPL-MCNC: 0.4 MG/DL (ref 0.1–1.5)
BUN SERPL-MCNC: 8 MG/DL (ref 8–22)
CALCIUM SERPL-MCNC: 7.5 MG/DL (ref 8.5–10.5)
CHLORIDE SERPL-SCNC: 108 MMOL/L (ref 96–112)
CO2 SERPL-SCNC: 22 MMOL/L (ref 20–33)
CREAT SERPL-MCNC: 0.6 MG/DL (ref 0.5–1.4)
E COLI SXT1+2 STL IA: NORMAL
EOSINOPHIL # BLD AUTO: 0.01 K/UL (ref 0–0.51)
EOSINOPHIL NFR BLD: 0.1 % (ref 0–6.9)
ERYTHROCYTE [DISTWIDTH] IN BLOOD BY AUTOMATED COUNT: 46.5 FL (ref 35.9–50)
GLOBULIN SER CALC-MCNC: 2.4 G/DL (ref 1.9–3.5)
GLUCOSE SERPL-MCNC: 108 MG/DL (ref 65–99)
HCT VFR BLD AUTO: 36.9 % (ref 37–47)
HGB BLD-MCNC: 12.4 G/DL (ref 12–16)
IMM GRANULOCYTES # BLD AUTO: 0.04 K/UL (ref 0–0.11)
IMM GRANULOCYTES NFR BLD AUTO: 0.6 % (ref 0–0.9)
LYMPHOCYTES # BLD AUTO: 0.82 K/UL (ref 1–4.8)
LYMPHOCYTES NFR BLD: 12.3 % (ref 22–41)
MCH RBC QN AUTO: 30.8 PG (ref 27–33)
MCHC RBC AUTO-ENTMCNC: 33.6 G/DL (ref 33.6–35)
MCV RBC AUTO: 91.8 FL (ref 81.4–97.8)
MONOCYTES # BLD AUTO: 0.56 K/UL (ref 0–0.85)
MONOCYTES NFR BLD AUTO: 8.4 % (ref 0–13.4)
NEUTROPHILS # BLD AUTO: 5.22 K/UL (ref 2–7.15)
NEUTROPHILS NFR BLD: 78.3 % (ref 44–72)
NRBC # BLD AUTO: 0 K/UL
NRBC BLD-RTO: 0 /100 WBC
PLATELET # BLD AUTO: 189 K/UL (ref 164–446)
PMV BLD AUTO: 10.5 FL (ref 9–12.9)
POTASSIUM SERPL-SCNC: 3.4 MMOL/L (ref 3.6–5.5)
PROT SERPL-MCNC: 5.8 G/DL (ref 6–8.2)
RBC # BLD AUTO: 4.02 M/UL (ref 4.2–5.4)
SIGNIFICANT IND 70042: NORMAL
SITE SITE: NORMAL
SODIUM SERPL-SCNC: 137 MMOL/L (ref 135–145)
SOURCE SOURCE: NORMAL
WBC # BLD AUTO: 6.7 K/UL (ref 4.8–10.8)

## 2018-10-19 PROCEDURE — 700101 HCHG RX REV CODE 250: Performed by: PHYSICAL MEDICINE & REHABILITATION

## 2018-10-19 PROCEDURE — 85025 COMPLETE CBC W/AUTO DIFF WBC: CPT

## 2018-10-19 PROCEDURE — 700102 HCHG RX REV CODE 250 W/ 637 OVERRIDE(OP): Performed by: STUDENT IN AN ORGANIZED HEALTH CARE EDUCATION/TRAINING PROGRAM

## 2018-10-19 PROCEDURE — A9270 NON-COVERED ITEM OR SERVICE: HCPCS | Performed by: STUDENT IN AN ORGANIZED HEALTH CARE EDUCATION/TRAINING PROGRAM

## 2018-10-19 PROCEDURE — 96372 THER/PROPH/DIAG INJ SC/IM: CPT

## 2018-10-19 PROCEDURE — 700111 HCHG RX REV CODE 636 W/ 250 OVERRIDE (IP): Performed by: PHYSICAL MEDICINE & REHABILITATION

## 2018-10-19 PROCEDURE — A9270 NON-COVERED ITEM OR SERVICE: HCPCS | Performed by: PHYSICAL MEDICINE & REHABILITATION

## 2018-10-19 PROCEDURE — G0378 HOSPITAL OBSERVATION PER HR: HCPCS

## 2018-10-19 PROCEDURE — 80053 COMPREHEN METABOLIC PANEL: CPT

## 2018-10-19 PROCEDURE — 700102 HCHG RX REV CODE 250 W/ 637 OVERRIDE(OP): Performed by: PHYSICAL MEDICINE & REHABILITATION

## 2018-10-19 PROCEDURE — 96376 TX/PRO/DX INJ SAME DRUG ADON: CPT

## 2018-10-19 PROCEDURE — C9113 INJ PANTOPRAZOLE SODIUM, VIA: HCPCS | Performed by: INTERNAL MEDICINE

## 2018-10-19 PROCEDURE — 700111 HCHG RX REV CODE 636 W/ 250 OVERRIDE (IP): Performed by: INTERNAL MEDICINE

## 2018-10-19 RX ORDER — ACETAMINOPHEN 325 MG/1
650 TABLET ORAL EVERY 4 HOURS PRN
Status: DISCONTINUED | OUTPATIENT
Start: 2018-10-19 | End: 2018-10-19 | Stop reason: HOSPADM

## 2018-10-19 RX ORDER — OMEPRAZOLE 20 MG/1
20 CAPSULE, DELAYED RELEASE ORAL DAILY
Status: DISCONTINUED | OUTPATIENT
Start: 2018-10-20 | End: 2018-10-19 | Stop reason: HOSPADM

## 2018-10-19 RX ORDER — SODIUM CHLORIDE AND POTASSIUM CHLORIDE 300; 900 MG/100ML; MG/100ML
INJECTION, SOLUTION INTRAVENOUS CONTINUOUS
Status: DISCONTINUED | OUTPATIENT
Start: 2018-10-19 | End: 2018-10-19

## 2018-10-19 RX ORDER — LORATADINE 10 MG/1
10 TABLET ORAL DAILY
Status: DISCONTINUED | OUTPATIENT
Start: 2018-10-19 | End: 2018-10-19 | Stop reason: HOSPADM

## 2018-10-19 RX ORDER — PROCHLORPERAZINE MALEATE 5 MG/1
5 TABLET ORAL EVERY 8 HOURS PRN
Qty: 30 TAB | Refills: 0 | Status: SHIPPED | OUTPATIENT
Start: 2018-10-19 | End: 2019-03-29 | Stop reason: CLARIF

## 2018-10-19 RX ADMIN — SUCRALFATE 1 G: 1 TABLET ORAL at 06:22

## 2018-10-19 RX ADMIN — PANTOPRAZOLE SODIUM 40 MG: 40 INJECTION, POWDER, LYOPHILIZED, FOR SOLUTION INTRAVENOUS at 06:21

## 2018-10-19 RX ADMIN — ENOXAPARIN SODIUM 40 MG: 100 INJECTION SUBCUTANEOUS at 06:21

## 2018-10-19 RX ADMIN — LORATADINE 10 MG: 10 TABLET ORAL at 11:44

## 2018-10-19 RX ADMIN — PROCHLORPERAZINE EDISYLATE 10 MG: 5 INJECTION INTRAMUSCULAR; INTRAVENOUS at 07:58

## 2018-10-19 RX ADMIN — ACETAMINOPHEN 650 MG: 325 TABLET, FILM COATED ORAL at 07:58

## 2018-10-19 RX ADMIN — POTASSIUM CHLORIDE AND SODIUM CHLORIDE: 900; 300 INJECTION, SOLUTION INTRAVENOUS at 08:04

## 2018-10-19 RX ADMIN — LEVOTHYROXINE SODIUM 137 MCG: 137 TABLET ORAL at 06:21

## 2018-10-19 ASSESSMENT — PAIN SCALES - GENERAL: PAINLEVEL_OUTOF10: 7

## 2018-10-19 NOTE — PROGRESS NOTES
I called Dr. Burk from GI consultant who did EGD for the patient on last Tuesday 10/16 and discussed the case with him. He mentioned that her EGD showed only distal esophagitis and there is no significant stenosis and she did not need esophageal dilatation.

## 2018-10-19 NOTE — DISCHARGE SUMMARY
Discharge Summary    Attending: Lake Gorman MD  Senior resident: Galen Feliciano MD  Eric resident: Effie Hinojosa MD       CHIEF COMPLAINT ON ADMISSION  Chief Complaint   Patient presents with   • Nausea/Vomiting/Diarrhea     Sudden onset tonight at ~0000. No recent abx.   • Abdominal Pain     Sharp, stabbing RUQ abdominal pain.       Reason for Admission  EMS      Admission Date  10/18/2018    CODE STATUS  Full Code    HPI & HOSPITAL COURSE  Pleasant 48F with PMH of Schatzki's rings with routine dilation most recently two days prior to admission, GERD on PPI, fatty liver, thyroid CA s/p radiation and resection, chronic pain admitted with sudden onset of nausea, vomiting and RUQ abdominal pain which woke her from sleep on evening of admission on 10/18/18. On presentation, the patient reported abdominal pain that was sharp and radiated toward her right flank, nausea, vomiting, and diarrhea. Labs in the ED showed WBC 16.9 w/ left shift;  AG 14; Lipase WNL;  electrolytes WNL; C. Diff negative. Troponins negative.  In the ED, CT chest/ab/pelvis showed thickening of distal esophageal wall, bilateral overian cysts, hepatomegaly. No free air in the mediastinum or abdomen. No cholelithiasis noted.   In ED, patient received IV Zofran 8mg, IV Morphine 4mg , Dilaudid 1mg, GI cocktail, IV Compazine 10mg, and IV Ativan 0.25mg and IV NS. Pt did not feel better after receiving zofran and morphine but she did experience relief from pain and vomiting with administration of Compazine and Dilaudid. Patient was admitted for intractable nausea/vomiting and GI was consulted.     Dr. Sutton, gastroenterologist, came to see Ms Carrasco and believed that the RUQ pain was 2/2 to a reaction to the propofol or because of the dilatation during the EGD. Furthermore, he believed that the nausea and vomiting wsa likely 2/2 to a drug reaction, and that we should monitor her, advancing her diet as tolerated.    When patient was seen on  10/19/18, patient was doing much better, no longer had any nausea or vomiting, and her epigastric discomfort was improved. Patient did complain of a headache which resolved after receiving tylenol. Furthermore, patient complained of allergies that improved after receiving loratadine. She was excited that she might be discharged home and felt that all of the issues that brought her to the hospital were improved. Patient was asked to try to have a soft GI diet breakfast and to see if she tolerated it well. Patient was able to eat without any nausea or vomiting. She did ask to be discharged home with a couple of compazine pills to help with her symptoms.     Therefore, she is discharged in good and stable condition to home with close outpatient follow-up.    Discharge Date  10/19/2018    FOLLOW UP ITEMS POST DISCHARGE  Please follow up with your PCP in 1 week  Please follow up with GI      DISCHARGE DIAGNOSES  Active Problems:    Nausea vomiting and diarrhea POA: Unknown      Overview:                 Hypothyroidism POA: Yes    Chronic low back pain POA: Yes      Overview:           Chronic neck pain POA: Yes    Vitamin D deficiency POA: Yes      Overview: ICD-10 transition    Mild intermittent asthma without complication POA: Yes    History of papillary adenocarcinoma of thyroid POA: Yes    GERD (gastroesophageal reflux disease) POA: Yes    Schatzki's ring POA: Unknown  Resolved Problems:    * No resolved hospital problems. *      FOLLOW UP    [facility] MEDICINE [address] [id number]  In 1 week        MEDICATIONS ON DISCHARGE     Medication List      START taking these medications      Instructions   prochlorperazine 5 MG Tabs  Commonly known as:  COMPAZINE   Take 1 Tab by mouth every 8 hours as needed for Nausea/Vomiting.  Dose:  5 mg        CONTINUE taking these medications      Instructions   * albuterol 108 (90 Base) MCG/ACT Aers inhalation aerosol   Doctor's comments:  Patient  needs to establish care with her new provider for further refills.  Inhale 2 Puffs by mouth every 6 hours as needed for Shortness of Breath.  Dose:  2 Puff     * albuterol 2.5mg/3ml Nebu solution for nebulization  Commonly known as:  PROVENTIL   3 mL by Nebulization route every four hours as needed for Shortness of Breath.  Dose:  2.5 mg     levothyroxine 137 MCG Tabs  Commonly known as:  SYNTHROID   Take 1 Tab by mouth Every morning on an empty stomach. Plus an extra half a tab on Sundays  Dose:  137 mcg     ondansetron 4 MG Tbdp  Commonly known as:  ZOFRAN ODT   Take 1 Tab by mouth every 6 hours as needed for Nausea (vomiting).  Dose:  4 mg     oxyCODONE-acetaminophen 7.5-325 MG per tablet  Commonly known as:  PERCOCET   Take 1 Tab by mouth every four hours as needed.  Dose:  1 Tab     pantoprazole 40 MG Tbec  Commonly known as:  PROTONIX   Take 1 Tab by mouth 2 times a day.  Dose:  40 mg     sucralfate 1 GM Tabs  Commonly known as:  CARAFATE   Take 1 g by mouth 3 times a day.  Dose:  1 g     tizanidine 6 MG capsule  Commonly known as:  ZANAFLEX   Take 6 mg by mouth 2 Times a Day.  Dose:  6 mg     vitamin D (Ergocalciferol) 57581 units Caps capsule  Commonly known as:  DRISDOL   Take 50,000 Units by mouth every Saturday.  Dose:  02701 Units        * This list has 2 medication(s) that are the same as other medications prescribed for you. Read the directions carefully, and ask your doctor or other care provider to review them with you.                Allergies  Allergies   Allergen Reactions   • Propofol      Hypotension, and severe asthma attack   • Ciprofloxacin Rash     Rxn - about 2012   • Sulfa Drugs Rash     Rxn - about 2012       DIET  Orders Placed This Encounter   Procedures   • Diet Order Low Fiber(GI Soft) (advance as tolerated)     Standing Status:   Standing     Number of Occurrences:   1     Order Specific Question:   Diet:     Answer:   Low Fiber(GI Soft) [2]     Comments:   advance as tolerated        ACTIVITY  As tolerated.  Weight bearing as tolerated    CONSULTATIONS  Gastroenterology     PROCEDURES  None     LABORATORY  Lab Results   Component Value Date    SODIUM 137 10/19/2018    POTASSIUM 3.4 (L) 10/19/2018    CHLORIDE 108 10/19/2018    CO2 22 10/19/2018    GLUCOSE 108 (H) 10/19/2018    BUN 8 10/19/2018    CREATININE 0.60 10/19/2018    CREATININE 0.8 01/27/2009        Lab Results   Component Value Date    WBC 6.7 10/19/2018    HEMOGLOBIN 12.4 10/19/2018    HEMATOCRIT 36.9 (L) 10/19/2018    PLATELETCT 189 10/19/2018

## 2018-10-19 NOTE — PROGRESS NOTES
Assumed care of Pt at 0700 after report from CORRY Jean, assessment complete.  Pt A & O X 4, VSS, nsr; Pt c/o HA that feels like it could turn into migraine; MD at bedside ordered Tylenol and diet tray; Pt medicated per MAR, given coffee and warm compress for head.  Pt updated on POC; bed in low position, call light within reach - will continue to monitor.    1155  [facility] paged for updated on discharge per Pt request.  Pt feeling much better - HA mostly resolved, and now anxious to go home.  Waiting for call back.

## 2018-10-19 NOTE — NON-PROVIDER
[facility] FAMILY MEDICINE PROGRESS NOTE     Attending: MARVA ELLIOTT M.D.   Senior Resident: Galen Feliciano M.D. (PGY-2)  Eric Resident: Effie Hinojosa M.D. (PGY-1)  Medical Student: Vish Dahl ([facility])  PATIENT: Jayashree Carrasco; [id number]; 1970    ID: 48 y.o. female admitted for ***    SUBJECTIVE: No acute events overnight, ***    Past Medical History:   Diagnosis Date   • Chronic low back pain 4/12/2014    Takes 1-2 perc 10/325 usu only 1-2 times/day Last took perc a week rx from doc in [facility] ( Pain [place])    • Chronic neck pain 4/12/2014   • Unspecified vitamin D deficiency 4/12/2014   • ASTHMA    • Cancer (HCC)     papillary thyroid, removed in 1999, recurrence 2000 treated with radiation   • Fibromyalgia    • GERD (gastroesophageal reflux disease)     w/ esophageal dilation X3   • Thyroid disease        OBJECTIVE:  Vitals:    10/18/18 1515 10/18/18 2058 10/19/18 0107 10/19/18 0424   BP: 124/61 118/65 111/58 103/60   Pulse: 89 91 98 83   Resp: 18 20 20 16   Temp: 35.9 °C (96.6 °F) 37.1 °C (98.8 °F) 37.7 °C (99.9 °F) 36.9 °C (98.4 °F)   SpO2: 91% 99% 95% 95%   Weight: 109 kg (240 lb 4.8 oz)      Height:         No intake or output data in the 24 hours ending 10/19/18 0713    PE:  General: No acute distress, resting comfortably in bed.  HEENT: NC/AT. PERRLA. EOMI. MMM  Cardiovascular: RRR with no M/R/G.  Respiratory: Symmetrical chest. CTAB with no adventitious breath sounds   Abdomen: soft, NT/ND, no masses, +BS   EXT:  MENDOZA, 5/5 strength, No C/C/E 2+ pulses   Neuro: non focal with no numbness, tingling or changes in sensation    LABS:  Recent Labs      10/18/18   0436  10/19/18   0320   WBC  16.9*  6.7   RBC  4.87  4.02*   HEMOGLOBIN  14.5  12.4   HEMATOCRIT  44.4  36.9*   MCV  91.2  91.8   MCH  29.8  30.8   RDW  45.8  46.5   PLATELETCT  265  189   MPV  10.3  10.5   NEUTSPOLYS  89.70*  78.30*   LYMPHOCYTES  3.40*  12.30*   MONOCYTES  5.70  8.40   EOSINOPHILS  0.30  0.10   BASOPHILS  0.40  0.30      Recent Labs      10/18/18   0436  10/19/18   0320   SODIUM  140  137   POTASSIUM  4.0  3.4*   CHLORIDE  106  108   CO2  20  22   BUN  15  8   CREATININE  0.77  0.60   CALCIUM  8.9  7.5*   MAGNESIUM  1.7   --    ALBUMIN  3.9  3.4     Estimated GFR/CRCL = Estimated Creatinine Clearance: 138.3 mL/min (by C-G formula based on SCr of 0.6 mg/dL).  Recent Labs      10/18/18   0436  10/19/18   0320   GLUCOSE  135*  108*     Recent Labs      10/18/18   0436  10/19/18   0320   ASTSGOT  18  12   ALTSGPT  19  14   TBILIRUBIN  0.8  0.4   ALKPHOSPHAT  55  45   GLOBULIN  3.2  2.4     Recent Labs      10/18/18   0436   TROPONINI  <0.01               MICROBIOLOGY: ***    Results     Procedure Component Value Units Date/Time    VIRAL RESPIRATORY CULTURE [[id number]] Collected:  10/18/18 1120    Order Status:  Canceled Specimen:  Nasal from Nasal Updated:  10/18/18 1226    C Diff by PCR rflx Toxin [[id number]] Collected:  10/18/18 0436    Order Status:  Completed Specimen:  Stool from Stool Updated:  10/18/18 0806     C Diff by PCR Negative     Comment: C. difficile NOT detected by PCR.  Treatment not indicated per guidelines.  Repeat testing not indicated within 7 days.          027-NAP1-BI Presumptive Negative     Comment: Presumptive 027/NAP1/BI target DNA sequences are NOT DETECTED.       Narrative:       Does this patient have risk factors for C-diff?->Yes    CULTURE STOOL [[id number]] Collected:  10/18/18 0436    Order Status:  Completed Specimen:  Stool from Stool Updated:  10/18/18 0602    Narrative:       Does this patient have risk factors for C-diff?->Yes              IMAGING: ***  CT-CHEST,ABDOMEN,PELVIS WITH  Narrative:  10/18/2018 5:24 AM    HISTORY/REASON FOR EXAM:  Recent esophageal dilatation, evaluate for free air or fluid.    TECHNIQUE/EXAM DESCRIPTION: Transaxial MDCT scan of chest, abdomen, and pelvis post IV administration of 80 ml cc Omnipaque 350 IV contrast.    Low dose optimization technique was utilized for  this CT exam including automated exposure control and adjustment of the mA and/or kV according to patient size.    COMPARISON:  None.    CT CHEST FINDINGS:    The visualized portion of the thyroid appears within normal limits. The trachea and main stem airways are normal in caliber. There are no pathologically enlarged mediastinal lymph nodes.    The heart and pericardium appear within normal limits. The aorta and its main branch vessels are normal in caliber and configuration.    The pulmonary parenchyma is within normal limits.    The bony structures are age appropriate.    CT ABDOMEN FINDINGS:    The liver is normal in contour, no intrahepatic biliary ductal dilatation is seen. Hepatomegaly is identified. Postsurgical changes of cholecystectomy are seen.    The spleen, pancreas, and bilateral adrenal glands appear within normal limits.    The kidneys are unremarkable. The ureters are normal in caliber along their visualized course.    The colon appears within normal limits. The appendix is unremarkable. The small bowel is unremarkable. There is thickening of the distal esophageal wall. There is no extraluminal contrast or free air seen within the mediastinum or abdomen.    The bony structures are age appropriate.    CT PELVIS FINDINGS:    The bladder is within normal limits. Postsurgical changes of hysterectomy are seen.  1.4 cm right and 1.8 cm left ovarian cysts are seen.    The bony structures are age appropriate.  Impression: 1.  Thickening of the distal esophageal wall, given history of esophageal dilatation likely corresponds with area of stenosis. No visualized extraluminal contrast or air to suggest perforation.  2.  Bilateral ovarian cysts, consider follow-up with pelvic sonogram for further characterization as clinically appropriate.  3.  Hepatomegaly      MEDS:  Current Facility-Administered Medications   Medication Last Dose   • Respiratory Care per Protocol     • labetalol (NORMODYNE,TRANDATE)  injection 10 mg     • pantoprazole (PROTONIX) injection 40 mg 40 mg at 10/19/18 0621   • prochlorperazine (COMPAZINE) injection 10 mg 10 mg at 10/18/18 1937   • LORazepam (ATIVAN) injection 0.25 mg 0.25 mg at 10/18/18 2209   • HYDROmorphone (DILAUDID) injection 0.5 mg 0.5 mg at 10/18/18 1937   • ipratropium-albuterol (DUONEB) nebulizer solution     • enoxaparin (LOVENOX) inj 40 mg 40 mg at 10/19/18 0621   • [START ON 10/20/2018] vitamin D (Ergocalciferol) (DRISDOL) capsule 50,000 Units     • sucralfate (CARAFATE) tablet 1 g 1 g at 10/19/18 0622   • levothyroxine (SYNTHROID) tablet 137 mcg 137 mcg at 10/19/18 0621   • albuterol inhaler 2 Puff 2 Puff at 10/18/18 1854   • albuterol (PROVENTIL) 2.5mg/0.5ml nebulizer solution 2.5 mg     • 0.9 % NaCl with KCl 20 mEq infusion         PROBLEM LIST:  Problem List Items Addressed This Visit     None          ASSESSMENT/PLAN: ***  Jayashree Carrasco is a 48 y.o. female admitted with the following:  Active Problems:    Nausea vomiting and diarrhea    Hypothyroidism    Chronic low back pain    Chronic neck pain    Vitamin D deficiency    Mild intermittent asthma without complication    History of papillary adenocarcinoma of thyroid    GERD (gastroesophageal reflux disease)    Schatzki's ring        #***    #FEN/GI  -***    #Dispo  -***    #Core Measures   VTE PPx:***  Abx:***  Lines/Tubes:***/***  Fluids:***  Diet: ***  Code Status: ***        Vish Dahl, [facility]

## 2018-10-19 NOTE — PROGRESS NOTES
Patient is now under the care of the [facility] Family Medicine service, as she is a primary patient of theirs.

## 2018-10-19 NOTE — DISCHARGE INSTRUCTIONS
Nausea and Vomiting, Adult  Introduction  Feeling sick to your stomach (nausea) means that your stomach is upset or you feel like you have to throw up (vomit). Feeling more and more sick to your stomach can lead to throwing up. Throwing up happens when food and liquid from your stomach are thrown up and out the mouth. Throwing up can make you feel weak and cause you to get dehydrated. Dehydration can make you tired and thirsty, make you have a dry mouth, and make it so you pee (urinate) less often. Older adults and people with other diseases or a weak defense system (immune system) are at higher risk for dehydration. If you feel sick to your stomach or if you throw up, it is important to follow instructions from your doctor about how to take care of yourself.  Follow these instructions at home:  Eating and drinking  Follow these instructions as told by your doctor:  · Take an oral rehydration solution (ORS). This is a drink that is sold at pharmacies and stores.  · Drink clear fluids in small amounts as you are able, such as:  ¨ Water.  ¨ Ice chips.  ¨ Diluted fruit juice.  ¨ Low-calorie sports drinks.  · Eat bland, easy-to-digest foods in small amounts as you are able, such as:  ¨ Bananas.  ¨ Applesauce.  ¨ Rice.  ¨ Low-fat (lean) meats.  ¨ Toast.  ¨ Crackers.  · Avoid fluids that have a lot of sugar or caffeine in them.  · Avoid alcohol.  · Avoid spicy or fatty foods.  General instructions  · Drink enough fluid to keep your pee (urine) clear or pale yellow.  · Wash your hands often. If you cannot use soap and water, use hand .  · Make sure that all people in your home wash their hands well and often.  · Take over-the-counter and prescription medicines only as told by your doctor.  · Rest at home while you get better.  · Watch your condition for any changes.  · Breathe slowly and deeply when you feel sick to your stomach.  · Keep all follow-up visits as told by your doctor. This is important.  Contact a  doctor if:  · You have a fever.  · You cannot keep fluids down.  · Your symptoms get worse.  · You have new symptoms.  · You feel sick to your stomach for more than two days.  · You feel light-headed or dizzy.  · You have a headache.  · You have muscle cramps.  Get help right away if:  · You have pain in your chest, neck, arm, or jaw.  · You feel very weak or you pass out (faint).  · You throw up again and again.  · You see blood in your throw-up.  · Your throw-up looks like black coffee grounds.  · You have bloody or black poop (stools) or poop that look like tar.  · You have a very bad headache, a stiff neck, or both.  · You have a rash.  · You have very bad pain, cramping, or bloating in your belly (abdomen).  · You have trouble breathing.  · You are breathing very quickly.  · Your heart is beating very quickly.  · Your skin feels cold and clammy.  · You feel confused.  · You have pain when you pee.  · You have signs of dehydration, such as:  ¨ Dark pee, hardly any pee, or no pee.  ¨ Cracked lips.  ¨ Dry mouth.  ¨ Sunken eyes.  ¨ Sleepiness.  ¨ Weakness.  These symptoms may be an emergency. Do not wait to see if the symptoms will go away. Get medical help right away. Call your local emergency services (911 in the [place] Do not drive yourself to the hospital.   This information is not intended to replace advice given to you by your health care provider. Make sure you discuss any questions you have with your health care provider.  Document Released: 06/05/2009 Document Revised: 07/07/2017 Document Reviewed: 08/23/2016  © 2017 Elsevier    Discharge Instructions    Discharged to home by car with relative. Discharged via walking, hospital escort: Yes.  Special equipment needed: Not Applicable    Be sure to schedule a follow-up appointment with your primary care doctor or any specialists as instructed.     Discharge Plan:   Diet Plan: Discussed  Activity Level: Discussed  Confirmed Follow up Appointment: Patient to  Call and Schedule Appointment  Confirmed Symptoms Management: Discussed  Medication Reconciliation Updated: Yes  Influenza Vaccine Indication: Patient Refuses    I understand that a diet low in cholesterol, fat, and sodium is recommended for good health. Unless I have been given specific instructions below for another diet, I accept this instruction as my diet prescription.   Other diet: Regular    Special Instructions: None    · Is patient discharged on Warfarin / Coumadin?   No     Depression / Suicide Risk    As you are discharged from this [facility] facility, it is important to learn how to keep safe from harming yourself.    Recognize the warning signs:  · Abrupt changes in personality, positive or negative- including increase in energy   · Giving away possessions  · Change in eating patterns- significant weight changes-  positive or negative  · Change in sleeping patterns- unable to sleep or sleeping all the time   · Unwillingness or inability to communicate  · Depression  · Unusual sadness, discouragement and loneliness  · Talk of wanting to die  · Neglect of personal appearance   · Rebelliousness- reckless behavior  · Withdrawal from people/activities they love  · Confusion- inability to concentrate     If you or a loved one observes any of these behaviors or has concerns about self-harm, here's what you can do:  · Talk about it- your feelings and reasons for harming yourself  · Remove any means that you might use to hurt yourself (examples: pills, rope, extension cords, firearm)  · Get professional help from the community (Mental Health, Substance Abuse, psychological counseling)  · Do not be alone:Call your Safe Contact- someone whom you trust who will be there for you.  · Call your local CRISIS HOTLINE 232-3612 or 533-246-4216  · Call your local Children's Mobile Crisis Response Team [place] (363) 362-7334 or www.BrightRoll  · Call the toll free National Suicide Prevention Hotlines    · National Suicide Prevention Lifeline 728-763-NYOY (4858)  · National Hope Line Network 800-SUICIDE (920-8581)

## 2018-10-19 NOTE — PROGRESS NOTES
Pt given and understands discharge instructions, (1) Rx.  PIV removed, covered and wrapped with coban; Pt to  parking via wc; discharged home with family.

## 2018-10-20 ENCOUNTER — PATIENT OUTREACH (OUTPATIENT)
Dept: HEALTH INFORMATION MANAGEMENT | Facility: OTHER | Age: 48
End: 2018-10-20

## 2018-10-21 LAB
BACTERIA STL CULT: NORMAL
E COLI SXT1+2 STL IA: NORMAL
SIGNIFICANT IND 70042: NORMAL
SITE SITE: NORMAL
SOURCE SOURCE: NORMAL

## 2018-11-06 ENCOUNTER — OFFICE VISIT (OUTPATIENT)
Dept: URGENT CARE | Facility: PHYSICIAN GROUP | Age: 48
End: 2018-11-06
Payer: MEDICAID

## 2018-11-06 VITALS
HEART RATE: 84 BPM | OXYGEN SATURATION: 95 % | BODY MASS INDEX: 41.54 KG/M2 | DIASTOLIC BLOOD PRESSURE: 98 MMHG | SYSTOLIC BLOOD PRESSURE: 140 MMHG | WEIGHT: 242 LBS | RESPIRATION RATE: 16 BRPM | TEMPERATURE: 97.2 F

## 2018-11-06 DIAGNOSIS — Z87.09 HISTORY OF ASTHMA: ICD-10-CM

## 2018-11-06 DIAGNOSIS — J22 ACUTE LOWER RESPIRATORY TRACT INFECTION: ICD-10-CM

## 2018-11-06 DIAGNOSIS — R06.2 WHEEZING: ICD-10-CM

## 2018-11-06 PROCEDURE — 99214 OFFICE O/P EST MOD 30 MIN: CPT | Performed by: NURSE PRACTITIONER

## 2018-11-06 RX ORDER — LIDOCAINE 50 MG/G
1 OINTMENT TOPICAL PRN
Refills: 2 | COMMUNITY
Start: 2018-11-02 | End: 2020-09-23

## 2018-11-06 RX ORDER — PROMETHAZINE HYDROCHLORIDE 12.5 MG/1
TABLET ORAL
Refills: 0 | COMMUNITY
Start: 2018-10-23 | End: 2019-03-29 | Stop reason: CLARIF

## 2018-11-06 RX ORDER — FLUTICASONE PROPIONATE 50 MCG
2 SPRAY, SUSPENSION (ML) NASAL DAILY
Refills: 5 | COMMUNITY
Start: 2018-11-02 | End: 2020-09-23

## 2018-11-06 RX ORDER — PREDNISONE 20 MG/1
TABLET ORAL
Refills: 0 | COMMUNITY
Start: 2018-11-01 | End: 2019-03-29 | Stop reason: CLARIF

## 2018-11-06 RX ORDER — AZITHROMYCIN 250 MG/1
TABLET, FILM COATED ORAL
Qty: 6 TAB | Refills: 0 | Status: SHIPPED | OUTPATIENT
Start: 2018-11-06 | End: 2019-01-28

## 2018-11-06 RX ORDER — ALBUTEROL SULFATE 90 UG/1
2 AEROSOL, METERED RESPIRATORY (INHALATION) EVERY 6 HOURS PRN
Qty: 8.5 G | Refills: 0 | Status: SHIPPED | OUTPATIENT
Start: 2018-11-06 | End: 2019-03-29 | Stop reason: CLARIF

## 2018-11-06 RX ORDER — ONDANSETRON 4 MG/1
TABLET, FILM COATED ORAL
Refills: 2 | COMMUNITY
Start: 2018-10-12 | End: 2019-03-29 | Stop reason: CLARIF

## 2018-11-06 RX ORDER — IBUPROFEN 800 MG/1
800 TABLET ORAL EVERY 8 HOURS PRN
Refills: 5 | COMMUNITY
Start: 2018-11-02 | End: 2020-09-23

## 2018-11-06 RX ORDER — BENZONATATE 100 MG/1
100 CAPSULE ORAL 3 TIMES DAILY PRN
Qty: 60 CAP | Refills: 0 | Status: SHIPPED | OUTPATIENT
Start: 2018-11-06 | End: 2019-03-29 | Stop reason: CLARIF

## 2018-11-06 RX ORDER — NORTRIPTYLINE HYDROCHLORIDE 10 MG/1
10 CAPSULE ORAL
Refills: 0 | COMMUNITY
Start: 2018-10-09

## 2018-11-06 NOTE — PROGRESS NOTES
Chief Complaint   Patient presents with   • URI     cough, headaches       HISTORY OF PRESENT ILLNESS: Patient is a 48 y.o. female who presents today due to symptoms that started six weeks ago. Pt reports a productive cough. Reports associated wheezing, fever, chills, fatigue, malaise, and body aches. Admits to both a h/o asthma and CAP, requiring hospitalization 4 years ago. No immunocompromise. Has tried OTC cold medications without significant relief of symptoms. No recent ABX use. She was seen by her PCP after onset, was given a short burst of steroids without improvement. No other aggravating or alleviating factors.     Patient Active Problem List    Diagnosis Date Noted   • Nausea vomiting and diarrhea 10/18/2018     Priority: High   • Schatzki's ring 10/18/2018   • Mild intermittent asthma without complication 05/30/2017   • History of papillary adenocarcinoma of thyroid 05/30/2017   • GERD (gastroesophageal reflux disease) 05/30/2017   • Obesity (BMI 35.0-39.9 without comorbidity) 05/30/2017   • Abnormal drug screen 05/02/2017   • Chronic low back pain 04/12/2014   • Chronic neck pain 04/12/2014   • Vitamin D deficiency 04/12/2014   • Hypothyroidism 06/07/2012   • Fibromyalgia 06/07/2012       Allergies:Propofol; Ciprofloxacin; and Sulfa drugs    Current Outpatient Prescriptions Ordered in [facility]   Medication Sig Dispense Refill   • albuterol 108 (90 Base) MCG/ACT Aero Soln inhalation aerosol Inhale 2 Puffs by mouth every 6 hours as needed for Shortness of Breath. 8.5 g 0   • Spacer/Aero Chamber Mouthpiece Misc 1 Device by Does not apply route as needed. 1 Device 0   • azithromycin (ZITHROMAX) 250 MG Tab Take two tabs on day one followed by one tab on days 2-5. 6 Tab 0   • benzonatate (TESSALON) 100 MG Cap Take 1 Cap by mouth 3 times a day as needed for Cough. 60 Cap 0   • promethazine (PHENERGAN) 12.5 MG tablet   0   • predniSONE (DELTASONE) 20 MG Tab   0   • ondansetron (ZOFRAN) 4 MG Tab tablet   2   •  nortriptyline (PAMELOR) 10 MG Cap   0   • lidocaine (XYLOCAINE) 5 % Ointment   2   • ibuprofen (MOTRIN) 800 MG Tab   5   • fluticasone (FLONASE) 50 MCG/ACT nasal spray   5   • prochlorperazine (COMPAZINE) 5 MG Tab Take 1 Tab by mouth every 8 hours as needed for Nausea/Vomiting. 30 Tab 0   • tizanidine (ZANAFLEX) 6 MG capsule Take 6 mg by mouth 2 Times a Day.     • sucralfate (CARAFATE) 1 GM Tab Take 1 g by mouth 3 times a day.  1   • levothyroxine (SYNTHROID) 137 MCG Tab Take 1 Tab by mouth Every morning on an empty stomach. Plus an extra half a tab on Sundays 96 Tab 3   • oxyCODONE-acetaminophen (PERCOCET) 7.5-325 MG per tablet Take 1 Tab by mouth every four hours as needed.     • ondansetron (ZOFRAN ODT) 4 MG TABLET DISPERSIBLE Take 1 Tab by mouth every 6 hours as needed for Nausea (vomiting). 10 Tab 0   • albuterol (PROVENTIL) 2.5mg/3ml Nebu Soln solution for nebulization 3 mL by Nebulization route every four hours as needed for Shortness of Breath. 75 mL 0   • pantoprazole (PROTONIX) 40 MG Tablet Delayed Response Take 1 Tab by mouth 2 times a day. 60 Tab 0   • albuterol 108 (90 Base) MCG/ACT Aero Soln inhalation aerosol Inhale 2 Puffs by mouth every 6 hours as needed for Shortness of Breath. 8.5 g 0   • vitamin D, Ergocalciferol, (DRISDOL) 41095 units Cap capsule Take 50,000 Units by mouth every Saturday.       No current Epic-ordered facility-administered medications on file.        Past Medical History:   Diagnosis Date   • ASTHMA    • Cancer (HCC)     papillary thyroid, removed in 1999, recurrence 2000 treated with radiation   • Chronic low back pain 4/12/2014    Takes 1-2 perc 10/325 usu only 1-2 times/day Last took perc a week rx from doc in [facility] ( Pain [place])    • Chronic neck pain 4/12/2014   • Fibromyalgia    • GERD (gastroesophageal reflux disease)     w/ esophageal dilation X3   • Thyroid disease    • Unspecified vitamin D deficiency 4/12/2014       Social History   Substance Use Topics   •  Smoking status: Never Smoker   • Smokeless tobacco: Never Used   • Alcohol use No       Family Status   Relation Status   • Mo    • Fa Other   • MAunt (Not Specified)   • MGFa (Not Specified)   • Cou (Not Specified)     Family History   Problem Relation Age of Onset   • Stroke Mother 40   • Cancer Mother         leukemia   • Cancer Maternal Aunt 72        breast   • Diabetes Maternal Grandfather    • Cancer Cousin 51        bone       ROS:  Review of Systems   Constitutional: Positive for subjective fever, chills, fatigue, malaise. Negative for weight loss.  HENT: Negative for congestion, ear pressure, and sore throat. Negative for ear pain, nosebleeds, and neck pain.    Eyes: Negative for vision changes.   Cardiovascular: Negative for chest pain, palpitations, orthopnea and leg swelling.   Respiratory: Positive for cough and sputum production, shortness of breath and wheezing.   Gastrointestinal: Negative for abdominal pain, nausea, vomiting or diarrhea.   Skin: Negative for rash, diaphoresis.     Exam:  Blood pressure 140/98, pulse 84, temperature 36.2 °C (97.2 °F), resp. rate 16, weight 109.8 kg (242 lb), last menstrual period 2013, SpO2 95 %, not currently breastfeeding.  General: well-nourished, well-developed female in NAD  Head: normocephalic, atraumatic  Eyes: PERRLA, EOM within normal limits, no conjunctival injection, no scleral icterus, visual fields and acuity grossly intact.  Ears: normal shape and symmetry, no tenderness, no discharge. External canals are without any significant edema or erythema. Tympanic membranes are without any inflammation, no effusion. Gross auditory acuity is intact.  Nose: symmetrical without tenderness, mild discharge, erythema present bilateral nares.  Mouth/Throat: reasonable hygiene, no exudates or tonsillar enlargement. Mild erythema present.   Neck: no masses, range of motion within normal limits, no tracheal deviation.  Lymph: mild cervical adenopathy. No  supraclavicular adenopathy.   Neuro: alert and oriented. Cranial nerves 1-12 grossly intact.   Cardiovascular: regular rate and rhythm without murmurs, rubs, or gallops. No edema.   Pulmonary: no distress. Chest is symmetrical with respiration. No wheezes, crackles, or rhonchi.   Musculoskeletal: appropriate muscle tone, gait is stable.  Skin: warm, dry, intact, no clubbing, no cyanosis.   Psych: appropriate mood, affect, judgement.         Assessment/Plan:  1. Acute lower respiratory tract infection  albuterol 108 (90 Base) MCG/ACT Aero Soln inhalation aerosol    Spacer/Aero Chamber Mouthpiece Misc    azithromycin (ZITHROMAX) 250 MG Tab    benzonatate (TESSALON) 100 MG Cap   2. Wheezing  albuterol 108 (90 Base) MCG/ACT Aero Soln inhalation aerosol    Spacer/Aero Chamber Mouthpiece Misc   3. History of asthma  albuterol 108 (90 Base) MCG/ACT Aero Soln inhalation aerosol    Spacer/Aero Chamber Mouthpiece Misc    azithromycin (ZITHROMAX) 250 MG Tab           Azithromycin as directed. Tessalon as needed. Albuterol as needed, spacer provided.   Rest, increase fluids, hand and respiratory hygiene.   May take OTC medications as directed for symptom relief. Honey for cough.   Supportive care, differential diagnoses, and indications for immediate follow-up discussed with patient.   Pathogenesis of diagnosis discussed including typical length and natural progression.  Instructed to return to clinic or nearest emergency department for any change in condition, further concerns, or worsening of symptoms.  Patient states understanding of the plan of care and discharge instructions.  Instructed to make an appointment with their primary care provider in the next 3-7 days if not significantly improving and for further care.         Please note that this dictation was created using voice recognition software. I have made every reasonable attempt to correct obvious errors, but I expect that there are errors of grammar and possibly  content that I did not discover before finalizing the note.      ABDIEL Carroll.

## 2018-12-12 ENCOUNTER — HOSPITAL ENCOUNTER (OUTPATIENT)
Facility: MEDICAL CENTER | Age: 48
End: 2018-12-12
Attending: PHYSICIAN ASSISTANT
Payer: MEDICAID

## 2018-12-12 ENCOUNTER — OFFICE VISIT (OUTPATIENT)
Dept: URGENT CARE | Facility: CLINIC | Age: 48
End: 2018-12-12
Payer: MEDICAID

## 2018-12-12 VITALS
BODY MASS INDEX: 41.32 KG/M2 | RESPIRATION RATE: 13 BRPM | OXYGEN SATURATION: 98 % | TEMPERATURE: 97.6 F | DIASTOLIC BLOOD PRESSURE: 74 MMHG | SYSTOLIC BLOOD PRESSURE: 122 MMHG | WEIGHT: 242 LBS | HEART RATE: 71 BPM | HEIGHT: 64 IN

## 2018-12-12 DIAGNOSIS — J06.9 UPPER RESPIRATORY TRACT INFECTION, UNSPECIFIED TYPE: ICD-10-CM

## 2018-12-12 DIAGNOSIS — Z20.2 STD EXPOSURE: ICD-10-CM

## 2018-12-12 LAB
APPEARANCE UR: ABNORMAL
BILIRUB UR STRIP-MCNC: ABNORMAL MG/DL
CANDIDA DNA VAG QL PROBE+SIG AMP: POSITIVE
COLOR UR AUTO: YELLOW
G VAGINALIS DNA VAG QL PROBE+SIG AMP: NEGATIVE
GLUCOSE UR STRIP.AUTO-MCNC: ABNORMAL MG/DL
INT CON NEG: NORMAL
INT CON POS: NORMAL
KETONES UR STRIP.AUTO-MCNC: ABNORMAL MG/DL
LEUKOCYTE ESTERASE UR QL STRIP.AUTO: ABNORMAL
NITRITE UR QL STRIP.AUTO: ABNORMAL
PH UR STRIP.AUTO: 8.5 [PH] (ref 5–8)
PROT UR QL STRIP: ABNORMAL MG/DL
RBC UR QL AUTO: ABNORMAL
S PYO AG THROAT QL: NORMAL
SP GR UR STRIP.AUTO: 1.01
T VAGINALIS DNA VAG QL PROBE+SIG AMP: NEGATIVE
UROBILINOGEN UR STRIP-MCNC: 0.2 MG/DL

## 2018-12-12 PROCEDURE — 87510 GARDNER VAG DNA DIR PROBE: CPT

## 2018-12-12 PROCEDURE — 99214 OFFICE O/P EST MOD 30 MIN: CPT | Mod: 25 | Performed by: PHYSICIAN ASSISTANT

## 2018-12-12 PROCEDURE — 87660 TRICHOMONAS VAGIN DIR PROBE: CPT

## 2018-12-12 PROCEDURE — 87480 CANDIDA DNA DIR PROBE: CPT

## 2018-12-12 PROCEDURE — 87491 CHLMYD TRACH DNA AMP PROBE: CPT

## 2018-12-12 PROCEDURE — 87591 N.GONORRHOEAE DNA AMP PROB: CPT

## 2018-12-12 PROCEDURE — 81002 URINALYSIS NONAUTO W/O SCOPE: CPT | Performed by: PHYSICIAN ASSISTANT

## 2018-12-12 PROCEDURE — 87880 STREP A ASSAY W/OPTIC: CPT | Performed by: PHYSICIAN ASSISTANT

## 2018-12-12 RX ORDER — NAPROXEN 500 MG/1
TABLET ORAL
Qty: 30 TAB | Refills: 0 | Status: SHIPPED | OUTPATIENT
Start: 2018-12-12 | End: 2019-03-29 | Stop reason: CLARIF

## 2018-12-12 RX ORDER — DEXTROMETHORPHAN HYDROBROMIDE AND PROMETHAZINE HYDROCHLORIDE 15; 6.25 MG/5ML; MG/5ML
SYRUP ORAL
Qty: 180 ML | Refills: 0 | Status: SHIPPED | OUTPATIENT
Start: 2018-12-12 | End: 2019-03-29 | Stop reason: CLARIF

## 2018-12-12 ASSESSMENT — ENCOUNTER SYMPTOMS
JOINT SWELLING: 0
FEVER: 0
HEADACHES: 0
NECK PAIN: 0
ANOREXIA: 0
FATIGUE: 0
SORE THROAT: 0
ABDOMINAL PAIN: 0
NAUSEA: 0
MYALGIAS: 0
VISUAL CHANGE: 0
VOMITING: 0
COUGH: 1
NUMBNESS: 0
SWOLLEN GLANDS: 0
VERTIGO: 0

## 2018-12-12 NOTE — PROGRESS NOTES
Subjective:      Jayashree Carrasco is a 48 y.o. female who presents with Sexually Transmitted Diseases and Pharyngitis            Sexually Transmitted Diseases   This is a new problem. The current episode started 1 to 4 weeks ago. The problem occurs constantly. The problem has been unchanged. Associated symptoms include congestion, coughing and urinary symptoms. Pertinent negatives include no abdominal pain, anorexia, fatigue, fever, headaches, joint swelling, myalgias, nausea, neck pain, numbness, rash, sore throat, swollen glands, vertigo, visual change or vomiting. Nothing aggravates the symptoms. The treatment provided no relief.   Pharyngitis    Associated symptoms include congestion and coughing. Pertinent negatives include no abdominal pain, headaches, neck pain, swollen glands or vomiting.     Patient reports having her boyfriend cheated on her 1 month ago.  She has not been with him subsequently.  Denies any vaginal discharge or complaints but wants to be checked for STDs.  Has a history of four-day cough, runny nose, congestion.  Denies fevers, shortness of breath, chest pain or chest tightness.  Her grandkids have similar symptoms are being seen in the clinic    Past medical history: Is not pertinent to this examination  Past surgical history: Not pertinent to this examination  Family history: Is not pertinent to this examination  Allergies:Propofol; Ciprofloxacin; and Sulfa drugs    Social history: Is reviewed in Epic  Meds:   Current Outpatient Prescriptions on File Prior to Visit   Medication Sig Dispense Refill   • promethazine (PHENERGAN) 12.5 MG tablet   0   • predniSONE (DELTASONE) 20 MG Tab   0   • ondansetron (ZOFRAN) 4 MG Tab tablet   2   • nortriptyline (PAMELOR) 10 MG Cap   0   • lidocaine (XYLOCAINE) 5 % Ointment   2   • ibuprofen (MOTRIN) 800 MG Tab   5   • fluticasone (FLONASE) 50 MCG/ACT nasal spray   5   • tizanidine (ZANAFLEX) 6 MG capsule Take 6 mg by mouth 2 Times a Day.     • sucralfate  (CARAFATE) 1 GM Tab Take 1 g by mouth 3 times a day.  1   • oxyCODONE-acetaminophen (PERCOCET) 7.5-325 MG per tablet Take 1 Tab by mouth every four hours as needed.     • vitamin D, Ergocalciferol, (DRISDOL) 45161 units Cap capsule Take 50,000 Units by mouth every Saturday.     • albuterol 108 (90 Base) MCG/ACT Aero Soln inhalation aerosol Inhale 2 Puffs by mouth every 6 hours as needed for Shortness of Breath. 8.5 g 0   • Spacer/Aero Chamber Mouthpiece Misc 1 Device by Does not apply route as needed. 1 Device 0   • azithromycin (ZITHROMAX) 250 MG Tab Take two tabs on day one followed by one tab on days 2-5. 6 Tab 0   • benzonatate (TESSALON) 100 MG Cap Take 1 Cap by mouth 3 times a day as needed for Cough. 60 Cap 0   • prochlorperazine (COMPAZINE) 5 MG Tab Take 1 Tab by mouth every 8 hours as needed for Nausea/Vomiting. 30 Tab 0   • levothyroxine (SYNTHROID) 137 MCG Tab Take 1 Tab by mouth Every morning on an empty stomach. Plus an extra half a tab on Sundays 96 Tab 3   • ondansetron (ZOFRAN ODT) 4 MG TABLET DISPERSIBLE Take 1 Tab by mouth every 6 hours as needed for Nausea (vomiting). 10 Tab 0   • albuterol (PROVENTIL) 2.5mg/3ml Nebu Soln solution for nebulization 3 mL by Nebulization route every four hours as needed for Shortness of Breath. 75 mL 0   • pantoprazole (PROTONIX) 40 MG Tablet Delayed Response Take 1 Tab by mouth 2 times a day. 60 Tab 0   • albuterol 108 (90 Base) MCG/ACT Aero Soln inhalation aerosol Inhale 2 Puffs by mouth every 6 hours as needed for Shortness of Breath. 8.5 g 0     No current facility-administered medications on file prior to visit.          Review of Systems   Constitutional: Negative for fatigue and fever.   HENT: Positive for congestion. Negative for sore throat.    Respiratory: Positive for cough.    Gastrointestinal: Negative for abdominal pain, anorexia, nausea and vomiting.   Musculoskeletal: Negative for joint swelling, myalgias and neck pain.   Skin: Negative for rash.  "  Neurological: Negative for vertigo, numbness and headaches.          Objective:     /74 (BP Location: Right arm, Patient Position: Sitting, BP Cuff Size: Adult)   Pulse 71   Temp 36.4 °C (97.6 °F) (Temporal)   Resp 13   Ht 1.626 m (5' 4\")   Wt 109.8 kg (242 lb)   LMP 11/21/2013   SpO2 98%   BMI 41.54 kg/m²      Physical Exam       Gen.: Patient is A and O ×3, no acute distress, well-nourished well-hydrated  Vitals: Are listed and unremarkable  HEENT: Heads normocephalic, atraumatic, PERRLA, extraocular movements intact, TMs and oropharynx clear  Neck: Soft supple without cervical lymphadenopathy  Cardiovascular: Regular rate and rhythm normal S1 and S2. No murmurs, rubs or gallops  Lungs are clear to auscultation bilaterally. no wheezes rales or rhonchi  Abdomen is soft, nontender, nondistended with good bowel sounds, no hepatosplenomegaly  Skin: Is well perfused without evidence of rash or lesions  Neurological:  cranial nerves II through XII were assessed and intact.  Musculoskeletal: Full range of motion, 5 out of 5 strength against resistance  Neurovascularly: Intact with a 2 second cap refill, good distal pulses      Urgent CARE course: Urinalysis was unremarkable.  And urine pregnancy was negative  Gonorrhea and chlamydia and vaginal pathogens pending  .  Rapid strep was negative  Assessment/Plan:     1. Upper respiratory tract infection, unspecified type    - POCT Rapid Strep A  - naproxen (NAPROSYN) 500 MG Tab; Take one pill twice a days as needed with food  Dispense: 30 Tab; Refill: 0  - promethazine-dextromethorphan (PROMETHAZINE-DM) 6.25-15 MG/5ML syrup; Take 5mls every six hours as needed for cough  Dispense: 180 mL; Refill: 0    2. STD exposure      - VAGINAL PATHOGENS DNA PANEL; Standing  - CHLAMYDIA/GC PCR URINE OR SWAB; Future  - POCT Urinalysis  - HIV AG/AB COMBO ASSAY DIAGNOSTIC; Future  - RPR  - VAGINAL PATHOGENS DNA PANEL      "

## 2018-12-14 ENCOUNTER — HOSPITAL ENCOUNTER (OUTPATIENT)
Dept: LAB | Facility: MEDICAL CENTER | Age: 48
End: 2018-12-14
Attending: PHYSICIAN ASSISTANT
Payer: MEDICAID

## 2018-12-14 ENCOUNTER — TELEPHONE (OUTPATIENT)
Dept: ENDOCRINOLOGY | Facility: MEDICAL CENTER | Age: 48
End: 2018-12-14

## 2018-12-14 DIAGNOSIS — Z20.2 STD EXPOSURE: ICD-10-CM

## 2018-12-14 DIAGNOSIS — Z85.850 HISTORY OF THYROID CANCER: ICD-10-CM

## 2018-12-14 PROCEDURE — 87389 HIV-1 AG W/HIV-1&-2 AB AG IA: CPT

## 2018-12-14 PROCEDURE — 36415 COLL VENOUS BLD VENIPUNCTURE: CPT

## 2018-12-14 PROCEDURE — 86780 TREPONEMA PALLIDUM: CPT

## 2018-12-14 NOTE — TELEPHONE ENCOUNTER
1. Caller Name: Jayashree Carrasco                                             Call Back Number: [phone] (home)         Patient approves a detailed voicemail message: N\A    Patient has an appt with you on 02/13/19. She needs lab orders placed please.

## 2018-12-15 LAB
C TRACH DNA SPEC QL NAA+PROBE: NEGATIVE
EKG IMPRESSION: NORMAL
HIV 1+2 AB+HIV1 P24 AG SERPL QL IA: NON REACTIVE
N GONORRHOEA DNA SPEC QL NAA+PROBE: NEGATIVE
SPEC CONTAINER SPEC: NORMAL
SPECIMEN SOURCE: NORMAL
TREPONEMA PALLIDUM IGG+IGM AB [PRESENCE] IN SERUM OR PLASMA BY IMMUNOASSAY: NON REACTIVE

## 2018-12-18 ENCOUNTER — TELEPHONE (OUTPATIENT)
Dept: URGENT CARE | Facility: CLINIC | Age: 48
End: 2018-12-18

## 2018-12-18 DIAGNOSIS — B37.31 VAGINAL CANDIDIASIS: ICD-10-CM

## 2018-12-18 RX ORDER — FLUCONAZOLE 150 MG/1
150 TABLET ORAL DAILY
Qty: 1 TAB | Refills: 0 | Status: SHIPPED | OUTPATIENT
Start: 2018-12-18 | End: 2019-03-29 | Stop reason: CLARIF

## 2018-12-18 NOTE — TELEPHONE ENCOUNTER
I called patient back but was not able to leave a voice message.  Could not leave a message on her mailbox.  Her vaginal cultures were negative except positive for yeast.  I did call in Diflucan.  Did task medical assistant as well to let her know at [facility]

## 2018-12-28 ENCOUNTER — OFFICE VISIT (OUTPATIENT)
Dept: URGENT CARE | Facility: PHYSICIAN GROUP | Age: 48
End: 2018-12-28
Payer: MEDICAID

## 2018-12-28 VITALS
OXYGEN SATURATION: 97 % | RESPIRATION RATE: 16 BRPM | HEIGHT: 65 IN | DIASTOLIC BLOOD PRESSURE: 78 MMHG | BODY MASS INDEX: 39.99 KG/M2 | WEIGHT: 240 LBS | HEART RATE: 84 BPM | TEMPERATURE: 98.3 F | SYSTOLIC BLOOD PRESSURE: 110 MMHG

## 2018-12-28 DIAGNOSIS — R05.8 POST-VIRAL COUGH SYNDROME: ICD-10-CM

## 2018-12-28 DIAGNOSIS — R30.9 PAINFUL URINATION: ICD-10-CM

## 2018-12-28 DIAGNOSIS — R05.9 COUGH: ICD-10-CM

## 2018-12-28 LAB
APPEARANCE UR: CLEAR
BILIRUB UR STRIP-MCNC: NEGATIVE MG/DL
COLOR UR AUTO: NORMAL
GLUCOSE UR STRIP.AUTO-MCNC: NEGATIVE MG/DL
INT CON NEG: NEGATIVE
INT CON POS: POSITIVE
KETONES UR STRIP.AUTO-MCNC: NEGATIVE MG/DL
LEUKOCYTE ESTERASE UR QL STRIP.AUTO: NEGATIVE
NITRITE UR QL STRIP.AUTO: NEGATIVE
PH UR STRIP.AUTO: 5 [PH] (ref 5–8)
POC URINE PREGNANCY TEST: NEGATIVE
PROT UR QL STRIP: NEGATIVE MG/DL
RBC UR QL AUTO: NORMAL
SP GR UR STRIP.AUTO: 1.03
UROBILINOGEN UR STRIP-MCNC: NEGATIVE MG/DL

## 2018-12-28 PROCEDURE — 81002 URINALYSIS NONAUTO W/O SCOPE: CPT | Performed by: PHYSICIAN ASSISTANT

## 2018-12-28 PROCEDURE — 81025 URINE PREGNANCY TEST: CPT | Performed by: PHYSICIAN ASSISTANT

## 2018-12-28 PROCEDURE — 99214 OFFICE O/P EST MOD 30 MIN: CPT | Mod: 25 | Performed by: PHYSICIAN ASSISTANT

## 2018-12-28 RX ORDER — METHYLPREDNISOLONE 4 MG/1
4 TABLET ORAL SEE ADMIN INSTRUCTIONS
Qty: 21 TAB | Refills: 0 | Status: SHIPPED | OUTPATIENT
Start: 2018-12-28 | End: 2019-03-29 | Stop reason: CLARIF

## 2018-12-28 RX ORDER — DEXTROMETHORPHAN HYDROBROMIDE AND PROMETHAZINE HYDROCHLORIDE 15; 6.25 MG/5ML; MG/5ML
5 SYRUP ORAL EVERY 4 HOURS PRN
Qty: 120 ML | Refills: 0 | Status: SHIPPED | OUTPATIENT
Start: 2018-12-28 | End: 2018-12-28 | Stop reason: SDUPTHER

## 2018-12-28 RX ORDER — DEXTROMETHORPHAN HYDROBROMIDE AND PROMETHAZINE HYDROCHLORIDE 15; 6.25 MG/5ML; MG/5ML
5 SYRUP ORAL EVERY 4 HOURS PRN
Qty: 120 ML | Refills: 0 | Status: SHIPPED | OUTPATIENT
Start: 2018-12-28 | End: 2019-03-29 | Stop reason: CLARIF

## 2018-12-28 RX ORDER — METHYLPREDNISOLONE 4 MG/1
4 TABLET ORAL SEE ADMIN INSTRUCTIONS
Qty: 21 TAB | Refills: 0 | Status: SHIPPED | OUTPATIENT
Start: 2018-12-28 | End: 2018-12-28 | Stop reason: SDUPTHER

## 2018-12-29 NOTE — PROGRESS NOTES
Chief Complaint   Patient presents with   • Painful Urination   • Cough   • Back Pain   • Nausea       HISTORY OF PRESENT ILLNESS: Patient is a 48 y.o. female who presents today because she has a 2-3-week history of cough.  She has been using her albuterol inhaler without improvement.  Has been using some over-the-counter cough medications.  She also complains of several days of discomfort with urination    Patient Active Problem List    Diagnosis Date Noted   • Nausea vomiting and diarrhea 10/18/2018     Priority: High   • Vaginal candidiasis 12/18/2018   • Schatzki's ring 10/18/2018   • Mild intermittent asthma without complication 05/30/2017   • History of papillary adenocarcinoma of thyroid 05/30/2017   • GERD (gastroesophageal reflux disease) 05/30/2017   • Obesity (BMI 35.0-39.9 without comorbidity) 05/30/2017   • Abnormal drug screen 05/02/2017   • Chronic low back pain 04/12/2014   • Chronic neck pain 04/12/2014   • Vitamin D deficiency 04/12/2014   • Hypothyroidism 06/07/2012   • Fibromyalgia 06/07/2012       Allergies:Propofol; Ciprofloxacin; and Sulfa drugs    Current Outpatient Prescriptions Ordered in [facility]   Medication Sig Dispense Refill   • promethazine-dextromethorphan (PROMETHAZINE-DM) 6.25-15 MG/5ML syrup Take 5 mL by mouth every four hours as needed for Cough. 120 mL 0   • MethylPREDNISolone (MEDROL DOSEPAK) 4 MG Tablet Therapy Pack Take 1 Tab by mouth See Admin Instructions. 21 Tab 0   • fluconazole (DIFLUCAN) 150 MG tablet Take 1 Tab by mouth every day. 1 Tab 0   • naproxen (NAPROSYN) 500 MG Tab Take one pill twice a days as needed with food 30 Tab 0   • promethazine-dextromethorphan (PROMETHAZINE-DM) 6.25-15 MG/5ML syrup Take 5mls every six hours as needed for cough 180 mL 0   • nortriptyline (PAMELOR) 10 MG Cap   0   • lidocaine (XYLOCAINE) 5 % Ointment   2   • ibuprofen (MOTRIN) 800 MG Tab   5   • fluticasone (FLONASE) 50 MCG/ACT nasal spray   5   • Spacer/Aero Chamber Mouthpiece Misc 1  Device by Does not apply route as needed. 1 Device 0   • tizanidine (ZANAFLEX) 6 MG capsule Take 6 mg by mouth 2 Times a Day.     • levothyroxine (SYNTHROID) 137 MCG Tab Take 1 Tab by mouth Every morning on an empty stomach. Plus an extra half a tab on Sundays 96 Tab 3   • oxyCODONE-acetaminophen (PERCOCET) 7.5-325 MG per tablet Take 1 Tab by mouth every four hours as needed.     • albuterol (PROVENTIL) 2.5mg/3ml Nebu Soln solution for nebulization 3 mL by Nebulization route every four hours as needed for Shortness of Breath. 75 mL 0   • pantoprazole (PROTONIX) 40 MG Tablet Delayed Response Take 1 Tab by mouth 2 times a day. 60 Tab 0   • albuterol 108 (90 Base) MCG/ACT Aero Soln inhalation aerosol Inhale 2 Puffs by mouth every 6 hours as needed for Shortness of Breath. 8.5 g 0   • vitamin D, Ergocalciferol, (DRISDOL) 77404 units Cap capsule Take 50,000 Units by mouth every Saturday.     • promethazine (PHENERGAN) 12.5 MG tablet   0   • predniSONE (DELTASONE) 20 MG Tab   0   • ondansetron (ZOFRAN) 4 MG Tab tablet   2   • albuterol 108 (90 Base) MCG/ACT Aero Soln inhalation aerosol Inhale 2 Puffs by mouth every 6 hours as needed for Shortness of Breath. 8.5 g 0   • azithromycin (ZITHROMAX) 250 MG Tab Take two tabs on day one followed by one tab on days 2-5. (Patient not taking: Reported on 12/28/2018) 6 Tab 0   • benzonatate (TESSALON) 100 MG Cap Take 1 Cap by mouth 3 times a day as needed for Cough. (Patient not taking: Reported on 12/28/2018) 60 Cap 0   • prochlorperazine (COMPAZINE) 5 MG Tab Take 1 Tab by mouth every 8 hours as needed for Nausea/Vomiting. (Patient not taking: Reported on 12/28/2018) 30 Tab 0   • sucralfate (CARAFATE) 1 GM Tab Take 1 g by mouth 3 times a day.  1   • ondansetron (ZOFRAN ODT) 4 MG TABLET DISPERSIBLE Take 1 Tab by mouth every 6 hours as needed for Nausea (vomiting). (Patient not taking: Reported on 12/28/2018) 10 Tab 0     No current Epic-ordered facility-administered medications on  "file.        Past Medical History:   Diagnosis Date   • ASTHMA    • Cancer (HCC)     papillary thyroid, removed in , recurrence  treated with radiation   • Chronic low back pain 2014    Takes 1-2 perc 10/325 usu only 1-2 times/day Last took perc a week rx from [facility] ( [facility] [place])    • Chronic neck pain 2014   • Fibromyalgia    • GERD (gastroesophageal reflux disease)     w/ esophageal dilation X3   • Thyroid disease    • Unspecified vitamin D deficiency 2014       Social History   Substance Use Topics   • Smoking status: Never Smoker   • Smokeless tobacco: Never Used   • Alcohol use No       Family Status   Relation Status   • Mo    • Fa Other   • MAunt (Not Specified)   • MGFa (Not Specified)   • Cou (Not Specified)     Family History   Problem Relation Age of Onset   • Stroke Mother 40   • Cancer Mother         leukemia   • Cancer Maternal Aunt 72        breast   • Diabetes Maternal Grandfather    • Cancer Cousin 51        bone       ROS:  Review of Systems   Constitutional: Negative for fever, chills, weight loss and malaise/fatigue.   HENT: Negative for ear pain, nosebleeds, congestion, sore throat and neck pain.    Eyes: Negative for blurred vision.   Respiratory: Positive for cough, minimal sputum production, positive for cough related shortness of breath and wheezing.    Cardiovascular: Negative for chest pain, palpitations, orthopnea and leg swelling.   Gastrointestinal: Negative for heartburn, nausea, vomiting and abdominal pain.   Genitourinary: Negative for dysuria, urgency and frequency.     Exam:  Blood pressure 110/78, pulse 84, temperature 36.8 °C (98.3 °F), temperature source Temporal, resp. rate 16, height 1.651 m (5' 5\"), weight 108.9 kg (240 lb), last menstrual period 2013, SpO2 97 %, not currently breastfeeding.  General:  Well nourished, well developed female in NAD, frequent tight cough in the office  Head:Normocephalic, atraumatic  Eyes: " PERRLA, EOM within normal limits, no conjunctival injection, no scleral icterus, visual fields and acuity grossly intact.  Ears: Normal shape and symmetry, no tenderness, no discharge. External canals are without any significant edema or erythema. Tympanic membranes are without any inflammation, no effusion. Gross auditory acuity is intact  Nose: Symmetrical without tenderness, no discharge.  Mouth: reasonable hygiene, no erythema exudates or tonsillar enlargement.  Neck: no masses, range of motion within normal limits, no tracheal deviation. No obvious thyroid enlargement.  Pulmonary: chest is symmetrical with respiration, no wheezes, crackles, or rhonchi.  Bronchial bilaterally  Cardiovascular: regular rate and rhythm without murmurs, rubs, or gallops.  Extremities: no clubbing, cyanosis, or edema.    Urinalysis is unremarkable, with exception of specific gravity 1.030    HCG negative    Please note that this dictation was created using voice recognition software. I have made every reasonable attempt to correct obvious errors, but I expect that there are errors of grammar and possibly content that I did not discover before finalizing the note.    Assessment/Plan:  1. Painful urination  POCT Urinalysis    POCT Pregnancy   2. Post-viral cough syndrome  MethylPREDNISolone (MEDROL DOSEPAK) 4 MG Tablet Therapy Pack   3. Cough  promethazine-dextromethorphan (PROMETHAZINE-DM) 6.25-15 MG/5ML syrup   Concerning the painful urination, recommended increasing p.o. fluids, follow-up with primary care    Followup with primary care in the next 7-10 days if not significantly improving, return to the urgent care or go to the emergency room sooner for any worsening of symptoms.

## 2019-01-03 ENCOUNTER — OFFICE VISIT (OUTPATIENT)
Dept: URGENT CARE | Facility: PHYSICIAN GROUP | Age: 49
End: 2019-01-03
Payer: MEDICAID

## 2019-01-03 ENCOUNTER — APPOINTMENT (OUTPATIENT)
Dept: RADIOLOGY | Facility: IMAGING CENTER | Age: 49
End: 2019-01-03
Attending: NURSE PRACTITIONER
Payer: MEDICAID

## 2019-01-03 VITALS
BODY MASS INDEX: 40.48 KG/M2 | SYSTOLIC BLOOD PRESSURE: 128 MMHG | HEART RATE: 82 BPM | TEMPERATURE: 97.9 F | HEIGHT: 65 IN | DIASTOLIC BLOOD PRESSURE: 72 MMHG | WEIGHT: 243 LBS | RESPIRATION RATE: 16 BRPM | OXYGEN SATURATION: 96 %

## 2019-01-03 DIAGNOSIS — R07.9 CHEST PAIN AT REST: ICD-10-CM

## 2019-01-03 DIAGNOSIS — M54.5 LOW BACK PAIN, UNSPECIFIED BACK PAIN LATERALITY, UNSPECIFIED CHRONICITY, WITH SCIATICA PRESENCE UNSPECIFIED: ICD-10-CM

## 2019-01-03 DIAGNOSIS — J20.9 ACUTE BRONCHITIS, UNSPECIFIED ORGANISM: ICD-10-CM

## 2019-01-03 DIAGNOSIS — R05.9 COUGH: ICD-10-CM

## 2019-01-03 LAB
APPEARANCE UR: CLEAR
BILIRUB UR STRIP-MCNC: ABNORMAL MG/DL
COLOR UR AUTO: YELLOW
FLUAV+FLUBV AG SPEC QL IA: NORMAL
GLUCOSE UR STRIP.AUTO-MCNC: ABNORMAL MG/DL
INT CON NEG: NORMAL
INT CON POS: NORMAL
KETONES UR STRIP.AUTO-MCNC: ABNORMAL MG/DL
LEUKOCYTE ESTERASE UR QL STRIP.AUTO: ABNORMAL
NITRITE UR QL STRIP.AUTO: ABNORMAL
PH UR STRIP.AUTO: 8.5 [PH] (ref 5–8)
PROT UR QL STRIP: 30 MG/DL
RBC UR QL AUTO: ABNORMAL
SP GR UR STRIP.AUTO: 1.01
UROBILINOGEN UR STRIP-MCNC: 0.2 MG/DL

## 2019-01-03 PROCEDURE — 81002 URINALYSIS NONAUTO W/O SCOPE: CPT | Performed by: NURSE PRACTITIONER

## 2019-01-03 PROCEDURE — 87804 INFLUENZA ASSAY W/OPTIC: CPT | Performed by: NURSE PRACTITIONER

## 2019-01-03 PROCEDURE — 71046 X-RAY EXAM CHEST 2 VIEWS: CPT | Performed by: FAMILY MEDICINE

## 2019-01-03 PROCEDURE — 99214 OFFICE O/P EST MOD 30 MIN: CPT | Mod: 25 | Performed by: NURSE PRACTITIONER

## 2019-01-03 RX ORDER — AMOXICILLIN AND CLAVULANATE POTASSIUM 875; 125 MG/1; MG/1
1 TABLET, FILM COATED ORAL 2 TIMES DAILY
Qty: 14 TAB | Refills: 0 | Status: SHIPPED | OUTPATIENT
Start: 2019-01-03 | End: 2019-01-28

## 2019-01-03 RX ORDER — ACETAMINOPHEN 500 MG
500-1000 TABLET ORAL EVERY 6 HOURS PRN
Qty: 30 TAB | Refills: 0 | Status: SHIPPED | OUTPATIENT
Start: 2019-01-03 | End: 2019-03-29 | Stop reason: CLARIF

## 2019-01-03 ASSESSMENT — ENCOUNTER SYMPTOMS
WHEEZING: 0
SPUTUM PRODUCTION: 1
BACK PAIN: 1
NAUSEA: 0
MYALGIAS: 1
COUGH: 1
ORTHOPNEA: 0
CHILLS: 0
SORE THROAT: 0
SHORTNESS OF BREATH: 1
HEADACHES: 1
DIARRHEA: 0
FEVER: 0
ABDOMINAL PAIN: 1
EYE DISCHARGE: 0

## 2019-01-04 NOTE — PROGRESS NOTES
"Subjective:      Jayashree Carrasco is a 48 y.o. female who presents with Nasal Congestion (DX with pnumonia last month ) and Cough            HPI New problem. 48 year old female with nasal congestion, cough, chest pain, back pain and right upper quadrant pain for a week. She states she was diagnosed with \"beginning pneumonia\" at [facility] last month but no antibiotics. She does have asthma as well. No gall bladder, no urinary symptoms, fever, chills or other bodyaches. She has been taking over the counter medications for this issue.  Propofol; Ciprofloxacin; and Sulfa drugs  Current Outpatient Prescriptions on File Prior to Visit   Medication Sig Dispense Refill   • promethazine (PHENERGAN) 12.5 MG tablet   0   • tizanidine (ZANAFLEX) 6 MG capsule Take 6 mg by mouth 2 Times a Day.     • sucralfate (CARAFATE) 1 GM Tab Take 1 g by mouth 3 times a day.  1   • levothyroxine (SYNTHROID) 137 MCG Tab Take 1 Tab by mouth Every morning on an empty stomach. Plus an extra half a tab on Sundays 96 Tab 3   • oxyCODONE-acetaminophen (PERCOCET) 7.5-325 MG per tablet Take 1 Tab by mouth every four hours as needed.     • ondansetron (ZOFRAN ODT) 4 MG TABLET DISPERSIBLE Take 1 Tab by mouth every 6 hours as needed for Nausea (vomiting). 10 Tab 0   • pantoprazole (PROTONIX) 40 MG Tablet Delayed Response Take 1 Tab by mouth 2 times a day. 60 Tab 0   • vitamin D, Ergocalciferol, (DRISDOL) 71862 units Cap capsule Take 50,000 Units by mouth every Saturday.     • promethazine-dextromethorphan (PROMETHAZINE-DM) 6.25-15 MG/5ML syrup Take 5 mL by mouth every four hours as needed for Cough. (Patient not taking: Reported on 1/3/2019) 120 mL 0   • MethylPREDNISolone (MEDROL DOSEPAK) 4 MG Tablet Therapy Pack Take 1 Tab by mouth See Admin Instructions. (Patient not taking: Reported on 1/3/2019) 21 Tab 0   • fluconazole (DIFLUCAN) 150 MG tablet Take 1 Tab by mouth every day. (Patient not taking: Reported on 1/3/2019) 1 Tab 0   • naproxen (NAPROSYN) " 500 MG Tab Take one pill twice a days as needed with food (Patient not taking: Reported on 1/3/2019) 30 Tab 0   • promethazine-dextromethorphan (PROMETHAZINE-DM) 6.25-15 MG/5ML syrup Take 5mls every six hours as needed for cough (Patient not taking: Reported on 1/3/2019) 180 mL 0   • predniSONE (DELTASONE) 20 MG Tab   0   • ondansetron (ZOFRAN) 4 MG Tab tablet   2   • nortriptyline (PAMELOR) 10 MG Cap   0   • lidocaine (XYLOCAINE) 5 % Ointment   2   • ibuprofen (MOTRIN) 800 MG Tab   5   • fluticasone (FLONASE) 50 MCG/ACT nasal spray   5   • albuterol 108 (90 Base) MCG/ACT Aero Soln inhalation aerosol Inhale 2 Puffs by mouth every 6 hours as needed for Shortness of Breath. 8.5 g 0   • Spacer/Aero Chamber Mouthpiece Misc 1 Device by Does not apply route as needed. (Patient not taking: Reported on 1/3/2019) 1 Device 0   • azithromycin (ZITHROMAX) 250 MG Tab Take two tabs on day one followed by one tab on days 2-5. (Patient not taking: Reported on 12/28/2018) 6 Tab 0   • benzonatate (TESSALON) 100 MG Cap Take 1 Cap by mouth 3 times a day as needed for Cough. (Patient not taking: Reported on 1/3/2019) 60 Cap 0   • prochlorperazine (COMPAZINE) 5 MG Tab Take 1 Tab by mouth every 8 hours as needed for Nausea/Vomiting. (Patient not taking: Reported on 12/28/2018) 30 Tab 0   • albuterol (PROVENTIL) 2.5mg/3ml Nebu Soln solution for nebulization 3 mL by Nebulization route every four hours as needed for Shortness of Breath. 75 mL 0   • albuterol 108 (90 Base) MCG/ACT Aero Soln inhalation aerosol Inhale 2 Puffs by mouth every 6 hours as needed for Shortness of Breath. 8.5 g 0     No current facility-administered medications on file prior to visit.      Social History     Social History   • Marital status: Single     Spouse name: N/A   • Number of children: N/A   • Years of education: N/A     Occupational History   • Not on file.     Social History Main Topics   • Smoking status: Never Smoker   • Smokeless tobacco: Never Used   •  "Alcohol use No   • Drug use: No   • Sexual activity: Yes     Partners: Male     Birth control/ protection: Surgical     Other Topics Concern   • Not on file     Social History Narrative   • No narrative on file     family history includes Cancer in her mother; Cancer (age of onset: 51) in her cousin; Cancer (age of onset: 72) in her maternal aunt; Diabetes in her maternal grandfather; Stroke (age of onset: 40) in her mother.      Review of Systems   Constitutional: Positive for malaise/fatigue. Negative for chills and fever.   HENT: Positive for congestion. Negative for sore throat.    Eyes: Negative for discharge.   Respiratory: Positive for cough, sputum production and shortness of breath. Negative for wheezing.    Cardiovascular: Positive for chest pain. Negative for orthopnea.   Gastrointestinal: Positive for abdominal pain. Negative for diarrhea and nausea.   Musculoskeletal: Positive for back pain and myalgias.   Neurological: Positive for headaches.   Endo/Heme/Allergies: Negative for environmental allergies.          Objective:     /72   Pulse 82   Temp 36.6 °C (97.9 °F) (Temporal)   Resp 16   Ht 1.651 m (5' 5\")   Wt 110.2 kg (243 lb)   LMP 11/21/2013   SpO2 96%   BMI 40.44 kg/m²      Physical Exam   Constitutional: She is oriented to person, place, and time. She appears well-developed and well-nourished. No distress.   HENT:   Head: Normocephalic and atraumatic.   Right Ear: External ear and ear canal normal. Tympanic membrane is not injected and not perforated. No middle ear effusion.   Left Ear: External ear and ear canal normal. Tympanic membrane is not injected and not perforated.  No middle ear effusion.   Nose: Mucosal edema present.   Mouth/Throat: Posterior oropharyngeal erythema present. No oropharyngeal exudate.   Eyes: Conjunctivae are normal. Right eye exhibits no discharge. Left eye exhibits no discharge.   Neck: Normal range of motion. Neck supple.   Cardiovascular: Normal rate, " regular rhythm and normal heart sounds.    No murmur heard.  Pulmonary/Chest: Effort normal and breath sounds normal. No respiratory distress. She has no wheezes. She has no rales.   Musculoskeletal: Normal range of motion.   Normal movement of all 4 extremities.   Lymphadenopathy:     She has no cervical adenopathy.        Right: No supraclavicular adenopathy present.        Left: No supraclavicular adenopathy present.   Neurological: She is alert and oriented to person, place, and time. Gait normal.   Skin: Skin is warm and dry.   Psychiatric: She has a normal mood and affect. Her behavior is normal. Thought content normal.   Nursing note and vitals reviewed.              Assessment/Plan:     1. Chest pain at rest  EKG - Clinic Performed   2. Cough  DX-CHEST-2 VIEWS    POCT Influenza A/B   3. Acute bronchitis, unspecified organism  amoxicillin-clavulanate (AUGMENTIN) 875-125 MG Tab   4. Low back pain, unspecified back pain laterality, unspecified chronicity, with sciatica presence unspecified  POCT Urinalysis    acetaminophen (TYLENOL) 500 MG Tab     EKG with NSR, rate of 74; no ectopy or ST deviation.  Her indigestion type chest pain could be due to her GERD and her use of naprosyn, we will stop this.  augmentin  Extra strength tylenol. No more nsaids.  Differential diagnosis, natural history, supportive care, and indications for immediate follow-up discussed at length.   Her chest film is negative as well.

## 2019-01-28 ENCOUNTER — OFFICE VISIT (OUTPATIENT)
Dept: URGENT CARE | Facility: CLINIC | Age: 49
End: 2019-01-28
Payer: MEDICAID

## 2019-01-28 VITALS
HEIGHT: 65 IN | TEMPERATURE: 98.2 F | RESPIRATION RATE: 16 BRPM | SYSTOLIC BLOOD PRESSURE: 120 MMHG | WEIGHT: 243 LBS | OXYGEN SATURATION: 96 % | BODY MASS INDEX: 40.48 KG/M2 | HEART RATE: 87 BPM | DIASTOLIC BLOOD PRESSURE: 76 MMHG

## 2019-01-28 DIAGNOSIS — J01.40 ACUTE PANSINUSITIS, RECURRENCE NOT SPECIFIED: ICD-10-CM

## 2019-01-28 DIAGNOSIS — H66.001 ACUTE SUPPURATIVE OTITIS MEDIA OF RIGHT EAR WITHOUT SPONTANEOUS RUPTURE OF TYMPANIC MEMBRANE, RECURRENCE NOT SPECIFIED: ICD-10-CM

## 2019-01-28 DIAGNOSIS — R10.9 FLANK PAIN: ICD-10-CM

## 2019-01-28 DIAGNOSIS — R30.0 DYSURIA: ICD-10-CM

## 2019-01-28 LAB
APPEARANCE UR: NORMAL
BILIRUB UR STRIP-MCNC: NEGATIVE MG/DL
COLOR UR AUTO: NORMAL
GLUCOSE UR STRIP.AUTO-MCNC: NEGATIVE MG/DL
KETONES UR STRIP.AUTO-MCNC: NEGATIVE MG/DL
LEUKOCYTE ESTERASE UR QL STRIP.AUTO: NEGATIVE
NITRITE UR QL STRIP.AUTO: NEGATIVE
PH UR STRIP.AUTO: 5.5 [PH] (ref 5–8)
PROT UR QL STRIP: NEGATIVE MG/DL
RBC UR QL AUTO: NORMAL
SP GR UR STRIP.AUTO: >=1.03
UROBILINOGEN UR STRIP-MCNC: 0.2 MG/DL

## 2019-01-28 PROCEDURE — 81002 URINALYSIS NONAUTO W/O SCOPE: CPT | Performed by: NURSE PRACTITIONER

## 2019-01-28 PROCEDURE — 99214 OFFICE O/P EST MOD 30 MIN: CPT | Mod: 25 | Performed by: NURSE PRACTITIONER

## 2019-01-28 RX ORDER — AMOXICILLIN AND CLAVULANATE POTASSIUM 875; 125 MG/1; MG/1
1 TABLET, FILM COATED ORAL 2 TIMES DAILY
Qty: 14 TAB | Refills: 0 | Status: SHIPPED | OUTPATIENT
Start: 2019-01-28 | End: 2019-02-04

## 2019-01-28 ASSESSMENT — ENCOUNTER SYMPTOMS
FOCAL WEAKNESS: 0
ORTHOPNEA: 0
NAUSEA: 0
BLURRED VISION: 0
FEVER: 0
WHEEZING: 0
SHORTNESS OF BREATH: 0
SPUTUM PRODUCTION: 1
CHILLS: 1
SORE THROAT: 1
TINGLING: 0
PALPITATIONS: 0
EYE PAIN: 0
CONSTIPATION: 0
SENSORY CHANGE: 0
SINUS PAIN: 1
BACK PAIN: 0
COUGH: 1
ABDOMINAL PAIN: 0
DIZZINESS: 0
NECK PAIN: 0
BRUISES/BLEEDS EASILY: 0
HEADACHES: 1
HEMOPTYSIS: 0
MYALGIAS: 0
VOMITING: 0
DIARRHEA: 0
FLANK PAIN: 1

## 2019-01-28 ASSESSMENT — LIFESTYLE VARIABLES: SUBSTANCE_ABUSE: 0

## 2019-01-28 NOTE — PROGRESS NOTES
Subjective:      Jayashree Carrasco is a 48 y.o. female who presents with Sinusitis (got dx with on Friday, wants antibiotics); Pyelonephritis (got dx with on Friday); and Ear Pain (got dx with on Friday)    Past Medical History:   Diagnosis Date   • ASTHMA    • Cancer (HCC)     papillary thyroid, removed in 1999, recurrence 2000 treated with radiation   • Chronic low back pain 4/12/2014    Takes 1-2 perc 10/325 usu only 1-2 times/day Last took perc a week rx from doc in [place] ( [facility])    • Chronic neck pain 4/12/2014   • Fibromyalgia    • GERD (gastroesophageal reflux disease)     w/ esophageal dilation X3   • Thyroid disease    • Unspecified vitamin D deficiency 4/12/2014     Past Surgical History:   Procedure Laterality Date   • ABDOMINAL HYSTERECTOMY TOTAL     • CHOLECYSTECTOMY     • THYROIDECTOMY       Family history review with pt and not pertinent to today's problem.   Social History   Substance Use Topics   • Smoking status: Never Smoker   • Smokeless tobacco: Never Used   • Alcohol use No     Allergies   Allergen Reactions   • Propofol      Hypotension, and severe asthma attack   • Ciprofloxacin Rash     Rxn - about 2012   • Sulfa Drugs Rash     Rxn - about 2012             HPI this is a new problem.  Talon is a 48-year-old female who presents with a sinusitis and kidney infection.  She was seen by her primary care provider 3 days ago who told her she did want to give her an antibiotic at this point because she wanted to wait until her culture came back.  Patient has been having increased right flank pain.  She takes 250 Keflex QD for UTI prophylaxis.  She has been hospitalized several times in the past for kidney infections.  She is also having some bilateral ear pain that has gotten worse over the weekend.  Subjective fevers starting over the weekend.  She has burning with urination.  She has tried to increase her fluid intake without relief of symptoms.  No other aggravating or alleviating factors  "at this time.    Review of Systems   Constitutional: Positive for chills and malaise/fatigue. Negative for fever.   HENT: Positive for congestion, ear pain, sinus pain and sore throat. Negative for ear discharge.    Eyes: Negative for blurred vision and pain.   Respiratory: Positive for cough and sputum production. Negative for hemoptysis, shortness of breath and wheezing.    Cardiovascular: Negative for chest pain, palpitations and orthopnea.   Gastrointestinal: Negative for abdominal pain, constipation, diarrhea, nausea and vomiting.   Genitourinary: Positive for dysuria and flank pain. Negative for frequency, hematuria and urgency.   Musculoskeletal: Negative for back pain, myalgias and neck pain.   Skin: Negative for rash.   Neurological: Positive for headaches. Negative for dizziness, tingling, sensory change and focal weakness.   Endo/Heme/Allergies: Negative for environmental allergies. Does not bruise/bleed easily.   Psychiatric/Behavioral: Negative for substance abuse.          Objective:     /76   Pulse 87   Temp 36.8 °C (98.2 °F)   Resp 16   Ht 1.651 m (5' 5\")   Wt 110.2 kg (243 lb)   LMP 11/21/2013   SpO2 96%   BMI 40.44 kg/m²      Physical Exam   Constitutional: She is oriented to person, place, and time. She appears well-developed and well-nourished. No distress.   HENT:   Head: Normocephalic.   Right Ear: Hearing, external ear and ear canal normal. Tympanic membrane is erythematous. Tympanic membrane is not bulging. A middle ear effusion is present.   Left Ear: Hearing, tympanic membrane, external ear and ear canal normal.   Nose: Mucosal edema and sinus tenderness present. No rhinorrhea. Right sinus exhibits frontal sinus tenderness. Right sinus exhibits no maxillary sinus tenderness. Left sinus exhibits frontal sinus tenderness. Left sinus exhibits no maxillary sinus tenderness.   Mouth/Throat: Uvula is midline and mucous membranes are normal. No uvula swelling. Oropharyngeal exudate " (pnd) present.   Eyes: Pupils are equal, round, and reactive to light. Right conjunctiva is injected. Left conjunctiva is injected.   Neck: Trachea normal, normal range of motion, full passive range of motion without pain and phonation normal. Neck supple.   Cardiovascular: Normal rate, regular rhythm, intact distal pulses and normal pulses.  PMI is not displaced.    Pulmonary/Chest: Effort normal and breath sounds normal.   Lymphadenopathy:     She has no cervical adenopathy.        Right: No supraclavicular adenopathy present.        Left: No supraclavicular adenopathy present.   Neurological: She is alert and oriented to person, place, and time. Gait normal.   Skin: Skin is warm and dry. She is not diaphoretic.   Psychiatric: She has a normal mood and affect. Her speech is normal and behavior is normal. Cognition and memory are normal.   Nursing note and vitals reviewed.       UA negative        Assessment/Plan:     1. Acute suppurative otitis media of right ear without spontaneous rupture of tympanic membrane, recurrence not specified  amoxicillin-clavulanate (AUGMENTIN) 875-125 MG Tab   2. Acute pansinusitis, recurrence not specified  amoxicillin-clavulanate (AUGMENTIN) 875-125 MG Tab   3. Flank pain  POCT Urinalysis   4. Dysuria       I will not culture UA as she has a culture that is already in progress from [facility] medicine.   Educated in proper administration of medication(s) ordered today including safety, possible SE, risks, benefits, rationale and alternatives to therapy.   Return to clinic or PCP 5-7  days if current symptoms are not resolving in a satisfactory manner or sooner if new or worsening symptoms occur.   Patient was advised of signs and symptoms which would warrant further evaluation and /or emergent evaluation in ER.  Verbalized agreement with this treatment plan and seemed to understand without barriers. Questions were encouraged and answered to patients satisfaction.   Aftercare instructions  were given to pt/ caregiver.

## 2019-02-13 ENCOUNTER — APPOINTMENT (OUTPATIENT)
Dept: ENDOCRINOLOGY | Facility: MEDICAL CENTER | Age: 49
End: 2019-02-13
Payer: MEDICAID

## 2019-03-28 ENCOUNTER — HOSPITAL ENCOUNTER (OUTPATIENT)
Facility: MEDICAL CENTER | Age: 49
End: 2019-03-30
Attending: EMERGENCY MEDICINE | Admitting: HOSPITALIST
Payer: MEDICAID

## 2019-03-28 ENCOUNTER — APPOINTMENT (OUTPATIENT)
Dept: RADIOLOGY | Facility: MEDICAL CENTER | Age: 49
End: 2019-03-28
Payer: MEDICAID

## 2019-03-28 DIAGNOSIS — E66.9 OBESITY (BMI 35.0-39.9 WITHOUT COMORBIDITY): ICD-10-CM

## 2019-03-28 DIAGNOSIS — R07.9 CHEST PAIN, UNSPECIFIED TYPE: ICD-10-CM

## 2019-03-28 LAB
ALBUMIN SERPL BCP-MCNC: 4.1 G/DL (ref 3.2–4.9)
ALBUMIN/GLOB SERPL: 1.5 G/DL
ALP SERPL-CCNC: 51 U/L (ref 30–99)
ALT SERPL-CCNC: 13 U/L (ref 2–50)
ANION GAP SERPL CALC-SCNC: 8 MMOL/L (ref 0–11.9)
AST SERPL-CCNC: 16 U/L (ref 12–45)
BASOPHILS # BLD AUTO: 0.4 % (ref 0–1.8)
BASOPHILS # BLD: 0.07 K/UL (ref 0–0.12)
BILIRUB SERPL-MCNC: 0.3 MG/DL (ref 0.1–1.5)
BUN SERPL-MCNC: 15 MG/DL (ref 8–22)
CALCIUM SERPL-MCNC: 8.7 MG/DL (ref 8.5–10.5)
CHLORIDE SERPL-SCNC: 107 MMOL/L (ref 96–112)
CO2 SERPL-SCNC: 27 MMOL/L (ref 20–33)
CREAT SERPL-MCNC: 0.69 MG/DL (ref 0.5–1.4)
EOSINOPHIL # BLD AUTO: 0.28 K/UL (ref 0–0.51)
EOSINOPHIL NFR BLD: 1.7 % (ref 0–6.9)
ERYTHROCYTE [DISTWIDTH] IN BLOOD BY AUTOMATED COUNT: 45.3 FL (ref 35.9–50)
GLOBULIN SER CALC-MCNC: 2.7 G/DL (ref 1.9–3.5)
GLUCOSE SERPL-MCNC: 117 MG/DL (ref 65–99)
HCT VFR BLD AUTO: 42.8 % (ref 37–47)
HGB BLD-MCNC: 13.9 G/DL (ref 12–16)
IMM GRANULOCYTES # BLD AUTO: 0.08 K/UL (ref 0–0.11)
IMM GRANULOCYTES NFR BLD AUTO: 0.5 % (ref 0–0.9)
LYMPHOCYTES # BLD AUTO: 2.6 K/UL (ref 1–4.8)
LYMPHOCYTES NFR BLD: 15.5 % (ref 22–41)
MCH RBC QN AUTO: 30.3 PG (ref 27–33)
MCHC RBC AUTO-ENTMCNC: 32.5 G/DL (ref 33.6–35)
MCV RBC AUTO: 93.2 FL (ref 81.4–97.8)
MONOCYTES # BLD AUTO: 0.97 K/UL (ref 0–0.85)
MONOCYTES NFR BLD AUTO: 5.8 % (ref 0–13.4)
NEUTROPHILS # BLD AUTO: 12.75 K/UL (ref 2–7.15)
NEUTROPHILS NFR BLD: 76.1 % (ref 44–72)
NRBC # BLD AUTO: 0 K/UL
NRBC BLD-RTO: 0 /100 WBC
PLATELET # BLD AUTO: 279 K/UL (ref 164–446)
PMV BLD AUTO: 9.8 FL (ref 9–12.9)
POTASSIUM SERPL-SCNC: 3.7 MMOL/L (ref 3.6–5.5)
PROT SERPL-MCNC: 6.8 G/DL (ref 6–8.2)
RBC # BLD AUTO: 4.59 M/UL (ref 4.2–5.4)
SODIUM SERPL-SCNC: 142 MMOL/L (ref 135–145)
WBC # BLD AUTO: 16.8 K/UL (ref 4.8–10.8)

## 2019-03-28 PROCEDURE — 99285 EMERGENCY DEPT VISIT HI MDM: CPT

## 2019-03-28 PROCEDURE — 84145 PROCALCITONIN (PCT): CPT

## 2019-03-28 PROCEDURE — 84484 ASSAY OF TROPONIN QUANT: CPT

## 2019-03-28 PROCEDURE — 93005 ELECTROCARDIOGRAM TRACING: CPT | Performed by: EMERGENCY MEDICINE

## 2019-03-28 PROCEDURE — 36415 COLL VENOUS BLD VENIPUNCTURE: CPT

## 2019-03-28 PROCEDURE — 85025 COMPLETE CBC W/AUTO DIFF WBC: CPT

## 2019-03-28 PROCEDURE — 80053 COMPREHEN METABOLIC PANEL: CPT

## 2019-03-28 PROCEDURE — 93005 ELECTROCARDIOGRAM TRACING: CPT

## 2019-03-29 ENCOUNTER — APPOINTMENT (OUTPATIENT)
Dept: RADIOLOGY | Facility: MEDICAL CENTER | Age: 49
End: 2019-03-29
Attending: EMERGENCY MEDICINE
Payer: MEDICAID

## 2019-03-29 ENCOUNTER — APPOINTMENT (OUTPATIENT)
Dept: RADIOLOGY | Facility: MEDICAL CENTER | Age: 49
End: 2019-03-29
Attending: HOSPITALIST
Payer: MEDICAID

## 2019-03-29 PROBLEM — K04.7 INFECTED TOOTH: Status: ACTIVE | Noted: 2019-03-29

## 2019-03-29 PROBLEM — D72.829 LEUKOCYTOSIS: Status: ACTIVE | Noted: 2019-03-29

## 2019-03-29 PROBLEM — R07.9 PAIN IN THE CHEST: Status: ACTIVE | Noted: 2019-03-29

## 2019-03-29 LAB
EKG IMPRESSION: NORMAL
EKG IMPRESSION: NORMAL
PROCALCITONIN SERPL-MCNC: <0.05 NG/ML
TROPONIN I SERPL-MCNC: <0.01 NG/ML (ref 0–0.04)

## 2019-03-29 PROCEDURE — 96365 THER/PROPH/DIAG IV INF INIT: CPT | Mod: XU

## 2019-03-29 PROCEDURE — 96375 TX/PRO/DX INJ NEW DRUG ADDON: CPT | Mod: XU

## 2019-03-29 PROCEDURE — 700111 HCHG RX REV CODE 636 W/ 250 OVERRIDE (IP)

## 2019-03-29 PROCEDURE — 36415 COLL VENOUS BLD VENIPUNCTURE: CPT

## 2019-03-29 PROCEDURE — 96372 THER/PROPH/DIAG INJ SC/IM: CPT | Mod: XU

## 2019-03-29 PROCEDURE — 700102 HCHG RX REV CODE 250 W/ 637 OVERRIDE(OP): Performed by: HOSPITALIST

## 2019-03-29 PROCEDURE — 700105 HCHG RX REV CODE 258: Performed by: HOSPITALIST

## 2019-03-29 PROCEDURE — 700117 HCHG RX CONTRAST REV CODE 255: Performed by: EMERGENCY MEDICINE

## 2019-03-29 PROCEDURE — 96366 THER/PROPH/DIAG IV INF ADDON: CPT

## 2019-03-29 PROCEDURE — 700111 HCHG RX REV CODE 636 W/ 250 OVERRIDE (IP): Performed by: HOSPITALIST

## 2019-03-29 PROCEDURE — G0378 HOSPITAL OBSERVATION PER HR: HCPCS

## 2019-03-29 PROCEDURE — 71275 CT ANGIOGRAPHY CHEST: CPT

## 2019-03-29 PROCEDURE — 96376 TX/PRO/DX INJ SAME DRUG ADON: CPT | Mod: XU

## 2019-03-29 PROCEDURE — 84484 ASSAY OF TROPONIN QUANT: CPT

## 2019-03-29 PROCEDURE — A9270 NON-COVERED ITEM OR SERVICE: HCPCS | Performed by: HOSPITALIST

## 2019-03-29 PROCEDURE — A9502 TC99M TETROFOSMIN: HCPCS

## 2019-03-29 PROCEDURE — 700101 HCHG RX REV CODE 250: Performed by: HOSPITALIST

## 2019-03-29 PROCEDURE — 99220 PR INITIAL OBSERVATION CARE,LEVL III: CPT | Performed by: HOSPITALIST

## 2019-03-29 PROCEDURE — 93005 ELECTROCARDIOGRAM TRACING: CPT | Mod: XU | Performed by: HOSPITALIST

## 2019-03-29 RX ORDER — REGADENOSON 0.08 MG/ML
INJECTION, SOLUTION INTRAVENOUS
Status: COMPLETED
Start: 2019-03-29 | End: 2019-03-29

## 2019-03-29 RX ORDER — ALBUTEROL SULFATE 90 UG/1
2 AEROSOL, METERED RESPIRATORY (INHALATION) EVERY 4 HOURS PRN
Status: DISCONTINUED | OUTPATIENT
Start: 2019-03-29 | End: 2019-03-30 | Stop reason: HOSPADM

## 2019-03-29 RX ORDER — ASPIRIN 300 MG/1
300 SUPPOSITORY RECTAL DAILY
Status: DISCONTINUED | OUTPATIENT
Start: 2019-03-29 | End: 2019-03-30 | Stop reason: HOSPADM

## 2019-03-29 RX ORDER — POLYETHYLENE GLYCOL 3350 17 G/17G
1 POWDER, FOR SOLUTION ORAL
Status: DISCONTINUED | OUTPATIENT
Start: 2019-03-29 | End: 2019-03-30 | Stop reason: HOSPADM

## 2019-03-29 RX ORDER — ONDANSETRON 4 MG/1
4 TABLET, ORALLY DISINTEGRATING ORAL EVERY 4 HOURS PRN
Status: DISCONTINUED | OUTPATIENT
Start: 2019-03-29 | End: 2019-03-30 | Stop reason: HOSPADM

## 2019-03-29 RX ORDER — LEVOTHYROXINE SODIUM 137 UG/1
137 TABLET ORAL 2 TIMES DAILY
COMMUNITY
End: 2019-11-19 | Stop reason: SDUPTHER

## 2019-03-29 RX ORDER — CLINDAMYCIN PHOSPHATE 600 MG/50ML
600 INJECTION, SOLUTION INTRAVENOUS EVERY 8 HOURS
Status: DISCONTINUED | OUTPATIENT
Start: 2019-03-29 | End: 2019-03-30 | Stop reason: HOSPADM

## 2019-03-29 RX ORDER — OXYBUTYNIN CHLORIDE 5 MG/1
5 TABLET ORAL EVERY MORNING
COMMUNITY
End: 2020-11-22

## 2019-03-29 RX ORDER — OMEPRAZOLE 20 MG/1
20 CAPSULE, DELAYED RELEASE ORAL 2 TIMES DAILY
Status: DISCONTINUED | OUTPATIENT
Start: 2019-03-29 | End: 2019-03-30 | Stop reason: HOSPADM

## 2019-03-29 RX ORDER — SODIUM CHLORIDE 9 MG/ML
INJECTION, SOLUTION INTRAVENOUS CONTINUOUS
Status: DISCONTINUED | OUTPATIENT
Start: 2019-03-29 | End: 2019-03-30 | Stop reason: HOSPADM

## 2019-03-29 RX ORDER — OXYCODONE HYDROCHLORIDE 5 MG/1
5 TABLET ORAL
Status: DISCONTINUED | OUTPATIENT
Start: 2019-03-29 | End: 2019-03-30 | Stop reason: HOSPADM

## 2019-03-29 RX ORDER — AMOXICILLIN 250 MG
2 CAPSULE ORAL 2 TIMES DAILY
Status: DISCONTINUED | OUTPATIENT
Start: 2019-03-29 | End: 2019-03-30 | Stop reason: HOSPADM

## 2019-03-29 RX ORDER — OXYCODONE HYDROCHLORIDE 10 MG/1
10 TABLET ORAL
Status: DISCONTINUED | OUTPATIENT
Start: 2019-03-29 | End: 2019-03-30 | Stop reason: HOSPADM

## 2019-03-29 RX ORDER — ASPIRIN 325 MG
325 TABLET ORAL DAILY
Status: DISCONTINUED | OUTPATIENT
Start: 2019-03-29 | End: 2019-03-30 | Stop reason: HOSPADM

## 2019-03-29 RX ORDER — PROMETHAZINE HYDROCHLORIDE 25 MG/1
12.5-25 SUPPOSITORY RECTAL EVERY 4 HOURS PRN
Status: DISCONTINUED | OUTPATIENT
Start: 2019-03-29 | End: 2019-03-30 | Stop reason: HOSPADM

## 2019-03-29 RX ORDER — SUCRALFATE 1 G/1
1 TABLET ORAL 3 TIMES DAILY
Status: DISCONTINUED | OUTPATIENT
Start: 2019-03-29 | End: 2019-03-30 | Stop reason: HOSPADM

## 2019-03-29 RX ORDER — CETIRIZINE HYDROCHLORIDE 10 MG/1
10 TABLET ORAL DAILY
COMMUNITY
End: 2020-09-23

## 2019-03-29 RX ORDER — PROMETHAZINE HYDROCHLORIDE 25 MG/1
25 TABLET ORAL 2 TIMES DAILY PRN
COMMUNITY
End: 2020-07-05 | Stop reason: ALTCHOICE

## 2019-03-29 RX ORDER — ONDANSETRON 2 MG/ML
4 INJECTION INTRAMUSCULAR; INTRAVENOUS ONCE
Status: COMPLETED | OUTPATIENT
Start: 2019-03-29 | End: 2019-03-29

## 2019-03-29 RX ORDER — NITROFURANTOIN 25; 75 MG/1; MG/1
100 CAPSULE ORAL DAILY
Status: ON HOLD | COMMUNITY
Start: 2019-03-13 | End: 2019-03-30

## 2019-03-29 RX ORDER — ONDANSETRON 2 MG/ML
4 INJECTION INTRAMUSCULAR; INTRAVENOUS EVERY 4 HOURS PRN
Status: DISCONTINUED | OUTPATIENT
Start: 2019-03-29 | End: 2019-03-30 | Stop reason: HOSPADM

## 2019-03-29 RX ORDER — BISACODYL 10 MG
10 SUPPOSITORY, RECTAL RECTAL
Status: DISCONTINUED | OUTPATIENT
Start: 2019-03-29 | End: 2019-03-30 | Stop reason: HOSPADM

## 2019-03-29 RX ORDER — LEVOTHYROXINE SODIUM 137 UG/1
137 TABLET ORAL
Status: DISCONTINUED | OUTPATIENT
Start: 2019-03-29 | End: 2019-03-30 | Stop reason: HOSPADM

## 2019-03-29 RX ORDER — CLINDAMYCIN HYDROCHLORIDE 300 MG/1
300 CAPSULE ORAL 4 TIMES DAILY
COMMUNITY
Start: 2019-03-25 | End: 2020-07-11

## 2019-03-29 RX ORDER — MORPHINE SULFATE 4 MG/ML
4 INJECTION, SOLUTION INTRAMUSCULAR; INTRAVENOUS ONCE
Status: COMPLETED | OUTPATIENT
Start: 2019-03-29 | End: 2019-03-29

## 2019-03-29 RX ORDER — HEPARIN SODIUM 5000 [USP'U]/ML
5000 INJECTION, SOLUTION INTRAVENOUS; SUBCUTANEOUS EVERY 8 HOURS
Status: DISCONTINUED | OUTPATIENT
Start: 2019-03-29 | End: 2019-03-30 | Stop reason: HOSPADM

## 2019-03-29 RX ORDER — PROMETHAZINE HYDROCHLORIDE 25 MG/1
12.5-25 TABLET ORAL EVERY 4 HOURS PRN
Status: DISCONTINUED | OUTPATIENT
Start: 2019-03-29 | End: 2019-03-30 | Stop reason: HOSPADM

## 2019-03-29 RX ORDER — MORPHINE SULFATE 4 MG/ML
4 INJECTION, SOLUTION INTRAMUSCULAR; INTRAVENOUS
Status: DISCONTINUED | OUTPATIENT
Start: 2019-03-29 | End: 2019-03-30 | Stop reason: HOSPADM

## 2019-03-29 RX ORDER — ASPIRIN 81 MG/1
324 TABLET, CHEWABLE ORAL DAILY
Status: DISCONTINUED | OUTPATIENT
Start: 2019-03-29 | End: 2019-03-30 | Stop reason: HOSPADM

## 2019-03-29 RX ADMIN — OMEPRAZOLE 20 MG: 20 CAPSULE, DELAYED RELEASE ORAL at 05:49

## 2019-03-29 RX ADMIN — CLINDAMYCIN IN 5 PERCENT DEXTROSE 600 MG: 12 INJECTION, SOLUTION INTRAVENOUS at 17:43

## 2019-03-29 RX ADMIN — HEPARIN SODIUM 5000 UNITS: 5000 INJECTION, SOLUTION INTRAVENOUS; SUBCUTANEOUS at 20:58

## 2019-03-29 RX ADMIN — OXYCODONE HYDROCHLORIDE 10 MG: 10 TABLET ORAL at 22:19

## 2019-03-29 RX ADMIN — IOHEXOL 100 ML: 350 INJECTION, SOLUTION INTRAVENOUS at 00:11

## 2019-03-29 RX ADMIN — ONDANSETRON 4 MG: 2 INJECTION INTRAMUSCULAR; INTRAVENOUS at 00:51

## 2019-03-29 RX ADMIN — OXYCODONE HYDROCHLORIDE 10 MG: 10 TABLET ORAL at 17:48

## 2019-03-29 RX ADMIN — SUCRALFATE 1 G: 1 TABLET ORAL at 05:49

## 2019-03-29 RX ADMIN — ONDANSETRON 4 MG: 2 INJECTION INTRAMUSCULAR; INTRAVENOUS at 07:38

## 2019-03-29 RX ADMIN — ONDANSETRON 4 MG: 2 INJECTION INTRAMUSCULAR; INTRAVENOUS at 12:06

## 2019-03-29 RX ADMIN — PROCHLORPERAZINE EDISYLATE 10 MG: 5 INJECTION INTRAMUSCULAR; INTRAVENOUS at 20:35

## 2019-03-29 RX ADMIN — SODIUM CHLORIDE: 9 INJECTION, SOLUTION INTRAVENOUS at 12:09

## 2019-03-29 RX ADMIN — ONDANSETRON 4 MG: 2 INJECTION INTRAMUSCULAR; INTRAVENOUS at 17:48

## 2019-03-29 RX ADMIN — MORPHINE SULFATE 4 MG: 4 INJECTION INTRAVENOUS at 14:23

## 2019-03-29 RX ADMIN — SODIUM CHLORIDE: 9 INJECTION, SOLUTION INTRAVENOUS at 05:51

## 2019-03-29 RX ADMIN — HEPARIN SODIUM 5000 UNITS: 5000 INJECTION, SOLUTION INTRAVENOUS; SUBCUTANEOUS at 04:29

## 2019-03-29 RX ADMIN — MORPHINE SULFATE 4 MG: 4 INJECTION INTRAVENOUS at 00:50

## 2019-03-29 RX ADMIN — REGADENOSON 0.4 MG: 0.08 INJECTION, SOLUTION INTRAVENOUS at 15:28

## 2019-03-29 RX ADMIN — LIDOCAINE HYDROCHLORIDE 15 ML: 20 SOLUTION OROPHARYNGEAL at 09:21

## 2019-03-29 RX ADMIN — ASPIRIN 325 MG: 325 TABLET ORAL at 05:49

## 2019-03-29 RX ADMIN — MORPHINE SULFATE 4 MG: 4 INJECTION INTRAVENOUS at 20:12

## 2019-03-29 RX ADMIN — OMEPRAZOLE 20 MG: 20 CAPSULE, DELAYED RELEASE ORAL at 17:43

## 2019-03-29 RX ADMIN — OXYCODONE HYDROCHLORIDE 5 MG: 10 TABLET ORAL at 07:36

## 2019-03-29 RX ADMIN — LIDOCAINE HYDROCHLORIDE 15 ML: 20 SOLUTION OROPHARYNGEAL at 02:13

## 2019-03-29 RX ADMIN — CLINDAMYCIN IN 5 PERCENT DEXTROSE 600 MG: 12 INJECTION, SOLUTION INTRAVENOUS at 11:11

## 2019-03-29 RX ADMIN — HEPARIN SODIUM 5000 UNITS: 5000 INJECTION, SOLUTION INTRAVENOUS; SUBCUTANEOUS at 17:43

## 2019-03-29 RX ADMIN — CLINDAMYCIN IN 5 PERCENT DEXTROSE 600 MG: 12 INJECTION, SOLUTION INTRAVENOUS at 02:00

## 2019-03-29 RX ADMIN — SUCRALFATE 1 G: 1 TABLET ORAL at 17:43

## 2019-03-29 RX ADMIN — SUCRALFATE 1 G: 1 TABLET ORAL at 12:05

## 2019-03-29 RX ADMIN — LEVOTHYROXINE SODIUM 137 MCG: 137 TABLET ORAL at 07:29

## 2019-03-29 RX ADMIN — OXYCODONE HYDROCHLORIDE 10 MG: 10 TABLET ORAL at 12:05

## 2019-03-29 ASSESSMENT — PATIENT HEALTH QUESTIONNAIRE - PHQ9
SUM OF ALL RESPONSES TO PHQ9 QUESTIONS 1 AND 2: 0
2. FEELING DOWN, DEPRESSED, IRRITABLE, OR HOPELESS: NOT AT ALL
2. FEELING DOWN, DEPRESSED, IRRITABLE, OR HOPELESS: NOT AT ALL
SUM OF ALL RESPONSES TO PHQ9 QUESTIONS 1 AND 2: 0
1. LITTLE INTEREST OR PLEASURE IN DOING THINGS: NOT AT ALL
1. LITTLE INTEREST OR PLEASURE IN DOING THINGS: NOT AT ALL

## 2019-03-29 ASSESSMENT — ENCOUNTER SYMPTOMS
NAUSEA: 0
SHORTNESS OF BREATH: 1
PALPITATIONS: 0
BRUISES/BLEEDS EASILY: 0
HALLUCINATIONS: 0
DIZZINESS: 0
HEMOPTYSIS: 0
DOUBLE VISION: 0
SENSORY CHANGE: 0
BLURRED VISION: 0
DEPRESSION: 0
CHILLS: 0
FEVER: 0
HEARTBURN: 0
SPEECH CHANGE: 0
ABDOMINAL PAIN: 0
VOMITING: 0
FLANK PAIN: 0
WEAKNESS: 0
MYALGIAS: 0
EYE DISCHARGE: 0
FOCAL WEAKNESS: 0
COUGH: 0

## 2019-03-29 ASSESSMENT — COPD QUESTIONNAIRES
COPD SCREENING SCORE: 1
DURING THE PAST 4 WEEKS HOW MUCH DID YOU FEEL SHORT OF BREATH: NONE/LITTLE OF THE TIME
HAVE YOU SMOKED AT LEAST 100 CIGARETTES IN YOUR ENTIRE LIFE: NO/DON'T KNOW
DO YOU EVER COUGH UP ANY MUCUS OR PHLEGM?: NO/ONLY WITH OCCASIONAL COLDS OR INFECTIONS

## 2019-03-29 ASSESSMENT — LIFESTYLE VARIABLES
TOTAL SCORE: 0
HOW MANY TIMES IN THE PAST YEAR HAVE YOU HAD 5 OR MORE DRINKS IN A DAY: 0
EVER FELT BAD OR GUILTY ABOUT YOUR DRINKING: NO
EVER_SMOKED: YES
HAVE PEOPLE ANNOYED YOU BY CRITICIZING YOUR DRINKING: NO
ON A TYPICAL DAY WHEN YOU DRINK ALCOHOL HOW MANY DRINKS DO YOU HAVE: 0
CONSUMPTION TOTAL: NEGATIVE
EVER_SMOKED: NEVER
AVERAGE NUMBER OF DAYS PER WEEK YOU HAVE A DRINK CONTAINING ALCOHOL: 0
TOTAL SCORE: 0
ALCOHOL_USE: YES
HAVE YOU EVER FELT YOU SHOULD CUT DOWN ON YOUR DRINKING: NO
EVER HAD A DRINK FIRST THING IN THE MORNING TO STEADY YOUR NERVES TO GET RID OF A HANGOVER: NO
SUBSTANCE_ABUSE: 0
TOTAL SCORE: 0

## 2019-03-29 ASSESSMENT — PAIN SCALES - WONG BAKER: WONGBAKER_NUMERICALRESPONSE: HURTS EVEN MORE

## 2019-03-29 NOTE — ED PROVIDER NOTES
ED Provider Note    ED Provider Note      Primary care provider: Shelby Bell M.D.    CHIEF COMPLAINT  Chief Complaint   Patient presents with   • Chest Pain     sudden onset, 10/10 stabbing, radiating down arm and into shoulders.        HPI  Jayashree Carrasco is a 48 y.o. female who presents to the Emergency Department with chief complaint Wife and chest pain.  Patient reports at 9 PM this evening she had 10 out of 10 severe acute onset of pain substernally with radiation into her abdomen into her neck and through to her back.  She did state it was maximal at onset.  Minor shortness of breath with onset nausea no emesis.  She is had no fevers no chills no cough or congestion.  No changes in urination bowel movement patient states that 8 years prior she did have an event where she told her troponins were elevated and she was hospitalized she states that she is not seen a cardiologist or primary care physician since that time no history of angiogram or stenting.  No other acute symptoms or concerns at this time.  Pain is currently down to 7 out of 10 after she was given aspirin nitroglycerin and fentanyl from EMS.        REVIEW OF SYSTEMS  10 systems reviewed and otherwise negative, pertinent positives and negatives listed in the history of present illness.        PAST MEDICAL HISTORY   has a past medical history of ASTHMA; Cancer (HCC); Chronic low back pain (4/12/2014); Chronic neck pain (4/12/2014); Fibromyalgia; GERD (gastroesophageal reflux disease); Thyroid disease; and Unspecified vitamin D deficiency (4/12/2014).    SURGICAL HISTORY   has a past surgical history that includes cholecystectomy; thyroidectomy; and abdominal hysterectomy total.    SOCIAL HISTORY  Social History   Substance Use Topics   • Smoking status: Never Smoker   • Smokeless tobacco: Never Used   • Alcohol use No      History   Drug Use No       FAMILY HISTORY  Non-Contributory    CURRENT MEDICATIONS  Home Medications     Reviewed by Vonda  "ROBERT Jackson R.N. (Registered Nurse) on 03/28/19 at 2313  Med List Status: <None>   Medication Last Dose Status   acetaminophen (TYLENOL) 500 MG Tab  Active   albuterol (PROVENTIL) 2.5mg/3ml Nebu Soln solution for nebulization  Active   albuterol 108 (90 Base) MCG/ACT Aero Soln inhalation aerosol  Active   albuterol 108 (90 Base) MCG/ACT Aero Soln inhalation aerosol  Active   benzonatate (TESSALON) 100 MG Cap  Active   fluconazole (DIFLUCAN) 150 MG tablet  Active   fluticasone (FLONASE) 50 MCG/ACT nasal spray  Active   ibuprofen (MOTRIN) 800 MG Tab  Active   levothyroxine (SYNTHROID) 137 MCG Tab  Active   lidocaine (XYLOCAINE) 5 % Ointment  Active   MethylPREDNISolone (MEDROL DOSEPAK) 4 MG Tablet Therapy Pack  Active   naproxen (NAPROSYN) 500 MG Tab  Active   nortriptyline (PAMELOR) 10 MG Cap  Active   ondansetron (ZOFRAN ODT) 4 MG TABLET DISPERSIBLE  Active   ondansetron (ZOFRAN) 4 MG Tab tablet  Active   oxyCODONE-acetaminophen (PERCOCET) 7.5-325 MG per tablet  Active   pantoprazole (PROTONIX) 40 MG Tablet Delayed Response  Active   predniSONE (DELTASONE) 20 MG Tab  Active   prochlorperazine (COMPAZINE) 5 MG Tab  Active   promethazine (PHENERGAN) 12.5 MG tablet  Active   promethazine-dextromethorphan (PROMETHAZINE-DM) 6.25-15 MG/5ML syrup  Active   promethazine-dextromethorphan (PROMETHAZINE-DM) 6.25-15 MG/5ML syrup  Active   Spacer/Aero Chamber Mouthpiece Misc  Active   sucralfate (CARAFATE) 1 GM Tab  Active   tizanidine (ZANAFLEX) 6 MG capsule  Active   vitamin D, Ergocalciferol, (DRISDOL) 45153 units Cap capsule  Active                ALLERGIES  Allergies   Allergen Reactions   • Propofol      Hypotension, and severe asthma attack   • Ciprofloxacin Rash     Rxn - about 2012   • Sulfa Drugs Rash     Rxn - about 2012       PHYSICAL EXAM  VITAL SIGNS: /66   Temp 36.2 °C (97.2 °F) (Temporal)   Resp 17   Ht 1.753 m (5' 9\")   Wt 122 kg (268 lb 15.4 oz)   LMP 11/21/2013   SpO2 94%   BMI 39.72 kg/m²   Pulse " ox interpretation: I interpret this pulse ox as normal.  Constitutional: Alert and oriented x 3, minimal distress  HEENT: Atraumatic normocephalic, pupils are equal round reactive to light extraocular movements are intact. The nares is clear, external ears are normal, mouth shows moist mucous membranes  Neck: Supple, no JVD no tracheal deviation  Cardiovascular: Regular rate and rhythm no murmur rub or gallop 2+ pulses peripherally x4  Thorax & Lungs: No respiratory distress, no wheezes rales or rhonchi, No chest tenderness.   GI: Soft nontender nondistended positive bowel sounds, no peritoneal signs  Skin: Warm dry no acute rash or lesion  Musculoskeletal: Moving all extremities with full range and 5 of 5 strength, no acute  deformity  Neurologic: Cranial nerves III through XII are grossly intact, no sensory deficit, no cerebellar dysfunction   Psychiatric: Appropriate affect for situation at this time      DIAGNOSTIC STUDIES / PROCEDURES  LABS      Results for orders placed or performed during the hospital encounter of 03/28/19   CBC with Differential   Result Value Ref Range    WBC 16.8 (H) 4.8 - 10.8 K/uL    RBC 4.59 4.20 - 5.40 M/uL    Hemoglobin 13.9 12.0 - 16.0 g/dL    Hematocrit 42.8 37.0 - 47.0 %    MCV 93.2 81.4 - 97.8 fL    MCH 30.3 27.0 - 33.0 pg    MCHC 32.5 (L) 33.6 - 35.0 g/dL    RDW 45.3 35.9 - 50.0 fL    Platelet Count 279 164 - 446 K/uL    MPV 9.8 9.0 - 12.9 fL    Neutrophils-Polys 76.10 (H) 44.00 - 72.00 %    Lymphocytes 15.50 (L) 22.00 - 41.00 %    Monocytes 5.80 0.00 - 13.40 %    Eosinophils 1.70 0.00 - 6.90 %    Basophils 0.40 0.00 - 1.80 %    Immature Granulocytes 0.50 0.00 - 0.90 %    Nucleated RBC 0.00 /100 WBC    Neutrophils (Absolute) 12.75 (H) 2.00 - 7.15 K/uL    Lymphs (Absolute) 2.60 1.00 - 4.80 K/uL    Monos (Absolute) 0.97 (H) 0.00 - 0.85 K/uL    Eos (Absolute) 0.28 0.00 - 0.51 K/uL    Baso (Absolute) 0.07 0.00 - 0.12 K/uL    Immature Granulocytes (abs) 0.08 0.00 - 0.11 K/uL    NRBC  (Absolute) 0.00 K/uL   Complete Metabolic Panel (CMP)   Result Value Ref Range    Sodium 142 135 - 145 mmol/L    Potassium 3.7 3.6 - 5.5 mmol/L    Chloride 107 96 - 112 mmol/L    Co2 27 20 - 33 mmol/L    Anion Gap 8.0 0.0 - 11.9    Glucose 117 (H) 65 - 99 mg/dL    Bun 15 8 - 22 mg/dL    Creatinine 0.69 0.50 - 1.40 mg/dL    Calcium 8.7 8.5 - 10.5 mg/dL    AST(SGOT) 16 12 - 45 U/L    ALT(SGPT) 13 2 - 50 U/L    Alkaline Phosphatase 51 30 - 99 U/L    Total Bilirubin 0.3 0.1 - 1.5 mg/dL    Albumin 4.1 3.2 - 4.9 g/dL    Total Protein 6.8 6.0 - 8.2 g/dL    Globulin 2.7 1.9 - 3.5 g/dL    A-G Ratio 1.5 g/dL   Troponin   Result Value Ref Range    Troponin I <0.01 0.00 - 0.04 ng/mL   ESTIMATED GFR   Result Value Ref Range    GFR If African American >60 >60 mL/min/1.73 m 2    GFR If Non African American >60 >60 mL/min/1.73 m 2   PROCALCITONIN   Result Value Ref Range    Procalcitonin <0.05 <0.25 ng/mL   EKG   Result Value Ref Range    Report       [facility] Emergency Dept.    Test Date:  2019  Pt Name:    CORAL ZAMAN                 Department: ER  MRN:        [id number]                      Room:       [facility]  Gender:     Female                       Technician: [id number]  :        1970                   Requested By:ER TRIAGE PROTOCOL  Order #:    [id number]                    Reading MD: DAYANA OCHOA MD    Measurements  Intervals                                Axis  Rate:       78                           P:          43  WI:         148                          QRS:        27  QRSD:       80                           T:          25  QT:         376  QTc:        429    Interpretive Statements  normal sinus rhythm at    78 , no ST elevation no ST depression no T-wave  inversion appropriate R-wave progression normal axis normal intervals no  other  ischemic or arrhythmic features  Compared to ECG 10/18/2018 05:16:34  Sinus arrhythmia no longer present  Ventricular premature complex(es) no  "longe r present    Electronically Signed On 3- 0:32:21 PDT by DAYANA OCHOA MD         All labs reviewed by me.      RADIOLOGY  CT-CTA COMPLETE THORACOABDOMINAL AORTA   Final Result      1.  No thoracic or dominant aortic dissection or aneurysm.      2.  No acute cardiopulmonary process identified.      3.  Small hiatal hernia.            NM-CARDIAC STRESS TEST    (Results Pending)     The radiologist's interpretation of all radiological studies have been reviewed by me.    COURSE & MEDICAL DECISION MAKING  Pertinent Labs & Imaging studies reviewed. (See chart for details)    11:44 PM - Patient seen and examined at bedside.  Patient had maximal chest pain at onset with radiation into her abdomen and back code aorta activated.      CTA shows no aneurysmal change or dissection of the aorta.  Patient's troponin is negative at this point her EKG is nonischemic.  Patient does state history of previous N STEMI without any intervention approximately 8 years prior.  Patient be admitted to the hospital for further evaluation and treatment at this time.  I discussed case with Dr. Mcnamara's help with this patient's greatly appreciated admitted in guarded condition.      Patient noted to have slightly elevated blood pressure likely circumstantial secondary to presenting complaint. Referred to primary care physician for further evaluation.      /66   Temp 36.2 °C (97.2 °F) (Temporal)   Resp 17   Ht 1.753 m (5' 9\")   Wt 122 kg (268 lb 15.4 oz)   LMP 11/21/2013   SpO2 94%   BMI 39.72 kg/m²             FINAL IMPRESSION  1. Chest pain, unspecified type Active       This dictation has been created using voice recognition software and/or scribes. The accuracy of the dictation is limited by the abilities of the software and the expertise of the scribes. I expect there may be some errors of grammar and possibly content. I made every attempt to manually correct the errors within my dictation. However, errors " related to voice recognition software and/or scribes may still exist and should be interpreted within the appropriate context.

## 2019-03-29 NOTE — ED NOTES
Second troponin sent to lab per MD order; hospitalist paged per pt request for another GI cocktail, stating she is still having a burning pain in esophagus.

## 2019-03-29 NOTE — PROGRESS NOTES
Attending Hospitalist is Dr Mcnamara from [id number]. Please call this Physician for orders, updates and questions.

## 2019-03-29 NOTE — ED NOTES
Pt c/o 6/10 chest pain currently, insisting on ice chips. Instructed on NPO status in ED, pt offered mouth swab instead. No apparent distress at this time, vss, blood drawn and sent to lab. Chest pain protocol initiated.

## 2019-03-29 NOTE — PROGRESS NOTES
Attending Hospitalist today is Dr Santos starting at 0900. Please call this Physician for orders, updates and questions.

## 2019-03-29 NOTE — DISCHARGE PLANNING
Care Transition Team Assessment    Spoke with patient at bedside. Anticipate no needs @ present time. Daughter will be ride @ D/C.    Information Source  Orientation : Oriented x 4  Information Given By: Patient    Readmission Evaluation  Is this a readmission?: No    Interdisciplinary Discharge Planning  Does Admitting Nurse Feel This Could be a Complex Discharge?: No  Primary Care Physician: Kaitlin Ivory  Lives with - Patient's Self Care Capacity: Adult Children  Patient or legal guardian wants to designate a caregiver (see row info): No  Support Systems: Family Member(s)  Housing / Facility: [address]  Do You Take your Prescribed Medications Regularly: Yes  Able to Return to Previous ADL's: Yes  Mobility Issues: No  Prior Services: None  Patient Expects to be Discharged to:: Home  Assistance Needed: No  Durable Medical Equipment: Not Applicable    Discharge Preparedness  What are your discharge supports?: Child  Prior Functional Level: Ambulatory    Functional Assesment  Prior Functional Level: Ambulatory    Finances  Prescription Coverage: Yes    Vision / Hearing Impairment  Vision Impairment : Yes  Right Eye Vision: Impaired, Wears Glasses  Left Eye Vision: Impaired, Wears Glasses  Hearing Impairment : No    Anticipated Discharge Information  Anticipated discharge disposition: Home  Discharge Address: [facility] [address] [facility] [address] [address]  Discharge Contact Phone Number: [phone]

## 2019-03-29 NOTE — ED NOTES
PT medicated per MAR for pain/nausea. Per nuc med pt to be taken for stress test around 1430 today, pt updated on this. VSS and pt denies further needs at this time.

## 2019-03-29 NOTE — ED TRIAGE NOTES
"Pt bib ems from [place]   Chief Complaint   Patient presents with   • Chest Pain     sudden onset, 10/10 stabbing, radiating down arm and into shoulders.      Medicated with 324mg asa, 3x intra nasal nitro, 1 inch nitro paste. A total of 7mg morphine, 12.5mg phenergan. With some relief    Blood pressure 108/66, temperature 36.2 °C (97.2 °F), temperature source Temporal, resp. rate 17, height 1.753 m (5' 9\"), weight 122 kg (268 lb 15.4 oz), last menstrual period 11/21/2013, SpO2 94 %, not currently breastfeeding.      "

## 2019-03-29 NOTE — PROGRESS NOTES
Pt arrived to unit via wheelchair at 1150. Pt oriented to room, unit, and plan of care. Tele-monitor placed, SR 63; Admit profile and assessment completed; Pt c/o nausea with CP, 8/10; Medicated per MAR; ABX completed; Pending stress at 1430; Diet restrictions discussed; All questions answered at this time. Call light within reach; fall precautions in place.

## 2019-03-29 NOTE — ED NOTES
Medicated per mar, hospital bed ordered for pt comfort, pt denies further needs at this time. Warm blanket provided

## 2019-03-29 NOTE — ED NOTES
Med rec complete per patient and [facility] pharmacy  Allergies reviewed      Patient is currently on Clindamycin for her teeth, she stopped her Macrobid she takes daily for UTI prophylaxis while she's on other antibiotic and when she completes she will start back on Macrobid      Verified all meds with pharmacy    Pharmacy stated she only takes thyroid once daily and an extra half tablet on Sunday but patient insists she takes it twice daily

## 2019-03-29 NOTE — ASSESSMENT & PLAN NOTE
Chest pain radiating into the neck  Initial troponin and ekg unrmarkable   CTA unremarkable except with hiatal hernia   Cont with serial troponin and ekg   Cardiac monitoring   caridac stress in am

## 2019-03-30 ENCOUNTER — PATIENT OUTREACH (OUTPATIENT)
Dept: HEALTH INFORMATION MANAGEMENT | Facility: OTHER | Age: 49
End: 2019-03-30

## 2019-03-30 ENCOUNTER — APPOINTMENT (OUTPATIENT)
Dept: CARDIOLOGY | Facility: MEDICAL CENTER | Age: 49
End: 2019-03-30
Attending: HOSPITALIST
Payer: MEDICAID

## 2019-03-30 VITALS
HEART RATE: 71 BPM | TEMPERATURE: 98 F | RESPIRATION RATE: 16 BRPM | OXYGEN SATURATION: 93 % | SYSTOLIC BLOOD PRESSURE: 102 MMHG | HEIGHT: 65 IN | WEIGHT: 249.12 LBS | BODY MASS INDEX: 41.51 KG/M2 | DIASTOLIC BLOOD PRESSURE: 61 MMHG

## 2019-03-30 PROBLEM — R07.9 PAIN IN THE CHEST: Status: RESOLVED | Noted: 2019-03-29 | Resolved: 2019-03-30

## 2019-03-30 PROBLEM — D72.829 LEUKOCYTOSIS: Status: RESOLVED | Noted: 2019-03-29 | Resolved: 2019-03-30

## 2019-03-30 LAB
ALBUMIN SERPL BCP-MCNC: 3.3 G/DL (ref 3.2–4.9)
ALBUMIN/GLOB SERPL: 1.4 G/DL
ALP SERPL-CCNC: 42 U/L (ref 30–99)
ALT SERPL-CCNC: 9 U/L (ref 2–50)
ANION GAP SERPL CALC-SCNC: 7 MMOL/L (ref 0–11.9)
AST SERPL-CCNC: 22 U/L (ref 12–45)
BASOPHILS # BLD AUTO: 0.4 % (ref 0–1.8)
BASOPHILS # BLD: 0.03 K/UL (ref 0–0.12)
BILIRUB SERPL-MCNC: 0.3 MG/DL (ref 0.1–1.5)
BUN SERPL-MCNC: 12 MG/DL (ref 8–22)
CALCIUM SERPL-MCNC: 7.7 MG/DL (ref 8.5–10.5)
CHLORIDE SERPL-SCNC: 110 MMOL/L (ref 96–112)
CO2 SERPL-SCNC: 21 MMOL/L (ref 20–33)
CREAT SERPL-MCNC: 0.65 MG/DL (ref 0.5–1.4)
EOSINOPHIL # BLD AUTO: 0.24 K/UL (ref 0–0.51)
EOSINOPHIL NFR BLD: 3.6 % (ref 0–6.9)
ERYTHROCYTE [DISTWIDTH] IN BLOOD BY AUTOMATED COUNT: 46.4 FL (ref 35.9–50)
GLOBULIN SER CALC-MCNC: 2.3 G/DL (ref 1.9–3.5)
GLUCOSE SERPL-MCNC: 123 MG/DL (ref 65–99)
HCT VFR BLD AUTO: 38.4 % (ref 37–47)
HGB BLD-MCNC: 12 G/DL (ref 12–16)
IMM GRANULOCYTES # BLD AUTO: 0.02 K/UL (ref 0–0.11)
IMM GRANULOCYTES NFR BLD AUTO: 0.3 % (ref 0–0.9)
LV EJECT FRACT  99904: 65
LV EJECT FRACT MOD 2C 99903: 59.29
LV EJECT FRACT MOD 4C 99902: 63.2
LV EJECT FRACT MOD BP 99901: 63.31
LYMPHOCYTES # BLD AUTO: 2.57 K/UL (ref 1–4.8)
LYMPHOCYTES NFR BLD: 38.4 % (ref 22–41)
MCH RBC QN AUTO: 29.9 PG (ref 27–33)
MCHC RBC AUTO-ENTMCNC: 31.3 G/DL (ref 33.6–35)
MCV RBC AUTO: 95.8 FL (ref 81.4–97.8)
MONOCYTES # BLD AUTO: 0.59 K/UL (ref 0–0.85)
MONOCYTES NFR BLD AUTO: 8.8 % (ref 0–13.4)
NEUTROPHILS # BLD AUTO: 3.24 K/UL (ref 2–7.15)
NEUTROPHILS NFR BLD: 48.5 % (ref 44–72)
NRBC # BLD AUTO: 0 K/UL
NRBC BLD-RTO: 0 /100 WBC
PLATELET # BLD AUTO: 227 K/UL (ref 164–446)
PMV BLD AUTO: 10 FL (ref 9–12.9)
POTASSIUM SERPL-SCNC: 5.3 MMOL/L (ref 3.6–5.5)
PROT SERPL-MCNC: 5.6 G/DL (ref 6–8.2)
RBC # BLD AUTO: 4.01 M/UL (ref 4.2–5.4)
SODIUM SERPL-SCNC: 138 MMOL/L (ref 135–145)
WBC # BLD AUTO: 6.7 K/UL (ref 4.8–10.8)

## 2019-03-30 PROCEDURE — 93306 TTE W/DOPPLER COMPLETE: CPT | Mod: 26 | Performed by: INTERNAL MEDICINE

## 2019-03-30 PROCEDURE — A9270 NON-COVERED ITEM OR SERVICE: HCPCS | Performed by: HOSPITALIST

## 2019-03-30 PROCEDURE — 700105 HCHG RX REV CODE 258: Performed by: HOSPITALIST

## 2019-03-30 PROCEDURE — 700102 HCHG RX REV CODE 250 W/ 637 OVERRIDE(OP): Performed by: HOSPITALIST

## 2019-03-30 PROCEDURE — 96366 THER/PROPH/DIAG IV INF ADDON: CPT

## 2019-03-30 PROCEDURE — 96376 TX/PRO/DX INJ SAME DRUG ADON: CPT | Mod: XU

## 2019-03-30 PROCEDURE — 99217 PR OBSERVATION CARE DISCHARGE: CPT | Performed by: HOSPITALIST

## 2019-03-30 PROCEDURE — G0378 HOSPITAL OBSERVATION PER HR: HCPCS

## 2019-03-30 PROCEDURE — 700101 HCHG RX REV CODE 250: Performed by: HOSPITALIST

## 2019-03-30 PROCEDURE — 80053 COMPREHEN METABOLIC PANEL: CPT

## 2019-03-30 PROCEDURE — 700111 HCHG RX REV CODE 636 W/ 250 OVERRIDE (IP): Performed by: HOSPITALIST

## 2019-03-30 PROCEDURE — 96372 THER/PROPH/DIAG INJ SC/IM: CPT

## 2019-03-30 PROCEDURE — 93306 TTE W/DOPPLER COMPLETE: CPT

## 2019-03-30 PROCEDURE — 85025 COMPLETE CBC W/AUTO DIFF WBC: CPT

## 2019-03-30 RX ORDER — SUCRALFATE 1 G/1
1 TABLET ORAL
Qty: 56 TAB | Refills: 0 | Status: SHIPPED | OUTPATIENT
Start: 2019-03-30 | End: 2019-04-13

## 2019-03-30 RX ADMIN — OMEPRAZOLE 20 MG: 20 CAPSULE, DELAYED RELEASE ORAL at 05:33

## 2019-03-30 RX ADMIN — SUCRALFATE 1 G: 1 TABLET ORAL at 05:34

## 2019-03-30 RX ADMIN — LEVOTHYROXINE SODIUM 137 MCG: 137 TABLET ORAL at 05:35

## 2019-03-30 RX ADMIN — ASPIRIN 325 MG: 325 TABLET ORAL at 05:33

## 2019-03-30 RX ADMIN — SODIUM CHLORIDE: 9 INJECTION, SOLUTION INTRAVENOUS at 02:22

## 2019-03-30 RX ADMIN — CLINDAMYCIN IN 5 PERCENT DEXTROSE 600 MG: 12 INJECTION, SOLUTION INTRAVENOUS at 02:21

## 2019-03-30 RX ADMIN — HEPARIN SODIUM 5000 UNITS: 5000 INJECTION, SOLUTION INTRAVENOUS; SUBCUTANEOUS at 05:33

## 2019-03-30 RX ADMIN — MORPHINE SULFATE 4 MG: 4 INJECTION INTRAVENOUS at 04:01

## 2019-03-30 ASSESSMENT — LIFESTYLE VARIABLES: REASON UNABLE TO ASSESS: N

## 2019-03-30 NOTE — DISCHARGE SUMMARY
"Discharge Summary    CHIEF COMPLAINT ON ADMISSION  Chief Complaint   Patient presents with   • Chest Pain     sudden onset, 10/10 stabbing, radiating down arm and into shoulders.      Reason for Admission  Chest pain     Admission Date  3/28/2019    CODE STATUS  Full Code    HPI & HOSPITAL COURSE  Ms. Carrasco is a 48-year-old female who presented to the emergency department on 3/28/2019 with complaints of chest pain that she states felt like \"really bad heartburn\".  She has a past medical history significant for asthma, acid reflux, hypothyroidism, and Schatzki's ring.  She follows with an outpatient gastroenterologist and has been maintained on protonix.  She is also undergone multiple EGDs with dilation.  Initial lab work was significant for leukocytosis with a WBC level of 16.8.  Her troponins and procalcitonin level were negative but she was noted to have an infected tooth.  Her EKGs were negative for acute ischemia or infarction.  A cardiac stress test was done and showed no evidence of ischemia but was positive for a slight protrusion at the inferior apex consistent with aneurysm.  A CTA was done and showed a small hiatal hernia but was otherwise unremarkable.  An echocardiogram was also done which was reviewed by cardiologist Dr. Campbell and was negative for apical aneurysm.  Ejection fraction was normal.  He subsequently cleared the patient for discharge. She will continue with her antibiotics as previously prescribed and follow-up with a dentist for her tooth infection.  Her leukocytosis has resolved and labs remained stable.  Her vital signs are within normal limits.  She is therefore medically clear for discharge today with close outpatient follow-up with her gastroenterologist and dentist.     Therefore, she is discharged in good and stable condition to home with close outpatient follow-up.    Discharge Date  3/30/2019    FOLLOW UP ITEMS POST DISCHARGE  PCP within 1-2 weeks.  Dentistry at first " available.  Gastroenterology as previously scheduled.    DISCHARGE DIAGNOSES  Principal Problem (Resolved):    Pain in the chest POA: Yes  Active Problems:    GERD (gastroesophageal reflux disease) POA: Yes    Infected tooth POA: Yes    Schatzki's ring POA: Yes    Hypothyroidism POA: Yes    Vitamin D deficiency POA: Yes      Overview: ICD-10 transition    Mild intermittent asthma without complication POA: Yes    Obesity (BMI 35.0-39.9 without comorbidity) POA: Yes  Resolved Problems:    Leukocytosis POA: Yes    FOLLOW UP  Future Appointments  Date Time Provider Department Center   4/15/2019 10:40 AM NICOLE Weems     MEDICATIONS ON DISCHARGE     Medication List      START taking these medications      Instructions   hyoscyamine-maalox plus-lidocaine viscous  Commonly known as:  GI COCKTAIL   Take 15-30 mL by mouth every 6 hours as needed.  Dose:  15-30 mL     sucralfate 1 GM Tabs  Commonly known as:  CARAFATE   Take 1 Tab by mouth 4 Times a Day,Before Meals and at Bedtime for 14 days.  Dose:  1 g        CONTINUE taking these medications      Instructions   * albuterol 108 (90 Base) MCG/ACT Aers inhalation aerosol   Doctor's comments:  Patient needs to establish care with her new provider for further refills.  Inhale 2 Puffs by mouth every 6 hours as needed for Shortness of Breath.  Dose:  2 Puff     * albuterol 2.5mg/3ml Nebu solution for nebulization  Commonly known as:  PROVENTIL   3 mL by Nebulization route every four hours as needed for Shortness of Breath.  Dose:  2.5 mg     cetirizine 10 MG Tabs  Commonly known as:  ZYRTEC   Take 10 mg by mouth every day.  Dose:  10 mg     clindamycin 300 MG Caps  Commonly known as:  CLEOCIN   Take 300 mg by mouth 4 times a day. 7 DAYS  Dose:  300 mg     fluticasone 50 MCG/ACT nasal spray  Commonly known as:  FLONASE   Spray 2 Sprays in nose every day.  Dose:  2 Spray     ibuprofen 800 MG Tabs  Commonly known as:  MOTRIN   Take 800 mg by mouth  every 8 hours as needed.  Dose:  800 mg     levothyroxine 137 MCG Tabs  Commonly known as:  SYNTHROID   Take 137 mcg by mouth 2 Times a Day.  Dose:  137 mcg     lidocaine 5 % Oint  Commonly known as:  XYLOCAINE   Apply 1 Each to affected area(s) as needed. BACK  Dose:  1 Each     nortriptyline 10 MG Caps  Commonly known as:  PAMELOR   Take 10 mg by mouth every bedtime.  Dose:  10 mg     oxybutynin 5 MG Tabs  Commonly known as:  DITROPAN   Take 5 mg by mouth every day.  Dose:  5 mg     oxyCODONE-acetaminophen 7.5-325 MG per tablet  Commonly known as:  PERCOCET   Take 1 Tab by mouth every 8 hours as needed.  Dose:  1 Tab     pantoprazole 40 MG Tbec  Commonly known as:  PROTONIX   Take 1 Tab by mouth 2 times a day.  Dose:  40 mg     promethazine 25 MG Tabs  Commonly known as:  PHENERGAN   Take 25 mg by mouth 2 times a day as needed for Nausea/Vomiting.  Dose:  25 mg     tizanidine 6 MG capsule  Commonly known as:  ZANAFLEX   Take 6 mg by mouth 2 Times a Day.  Dose:  6 mg     vitamin D (Ergocalciferol) 16710 units Caps capsule  Commonly known as:  DRISDOL   Take 50,000 Units by mouth every Saturday.  Dose:  01213 Units        * This list has 2 medication(s) that are the same as other medications prescribed for you. Read the directions carefully, and ask your doctor or other care provider to review them with you.            STOP taking these medications    nitrofurantoin monohyd macro 100 MG Caps  Commonly known as:  MACROBID          Allergies  Allergies   Allergen Reactions   • Propofol      Hypotension, and severe asthma attack   • Ciprofloxacin Rash     Rxn - about 2012   • Sulfa Drugs Rash     Rxn - about 2012     DIET  Orders Placed This Encounter   Procedures   • Diet Order Regular     Standing Status:   Standing     Number of Occurrences:   1     Order Specific Question:   Diet:     Answer:   Regular [1]     ACTIVITY  As tolerated.  Exercise encouraged.    CONSULTATIONS  Cardiology  curbside    PROCEDURES  None    LABORATORY  Lab Results   Component Value Date    SODIUM 138 03/30/2019    POTASSIUM 5.3 03/30/2019    CHLORIDE 110 03/30/2019    CO2 21 03/30/2019    GLUCOSE 123 (H) 03/30/2019    BUN 12 03/30/2019    CREATININE 0.65 03/30/2019    CREATININE 0.8 01/27/2009      Lab Results   Component Value Date    WBC 6.7 03/30/2019    HEMOGLOBIN 12.0 03/30/2019    HEMATOCRIT 38.4 03/30/2019    PLATELETCT 227 03/30/2019      Total time of the discharge process exceeds 32 minutes.

## 2019-03-30 NOTE — PROGRESS NOTES
Results of the stress test was reviewed and the stress portion is normal but the images suggest a possible apical aneurysm, CTA of chest reviewed that dies not show a solid mass. Echocardiogram ordered.

## 2019-03-30 NOTE — DISCHARGE INSTRUCTIONS
Discharge Instructions    Discharged to home by car with relative. Discharged via wheelchair, hospital escort: Yes.  Special equipment needed: Not Applicable    Be sure to schedule a follow-up appointment with your primary care doctor or any specialists as instructed.     Discharge Plan:   Diet Plan: Discussed  Activity Level: Discussed  Confirmed Follow up Appointment: Appointment Scheduled  Confirmed Symptoms Management: Discussed  Medication Reconciliation Updated: Yes  Influenza Vaccine Indication: Patient Refuses    I understand that a diet low in cholesterol, fat, and sodium is recommended for good health. Unless I have been given specific instructions below for another diet, I accept this instruction as my diet prescription.   Other diet: \    Special Instructions: None    · Is patient discharged on Warfarin / Coumadin?   No     Depression / Suicide Risk    As you are discharged from this [facility] facility, it is important to learn how to keep safe from harming yourself.    Recognize the warning signs:  · Abrupt changes in personality, positive or negative- including increase in energy   · Giving away possessions  · Change in eating patterns- significant weight changes-  positive or negative  · Change in sleeping patterns- unable to sleep or sleeping all the time   · Unwillingness or inability to communicate  · Depression  · Unusual sadness, discouragement and loneliness  · Talk of wanting to die  · Neglect of personal appearance   · Rebelliousness- reckless behavior  · Withdrawal from people/activities they love  · Confusion- inability to concentrate     If you or a loved one observes any of these behaviors or has concerns about self-harm, here's what you can do:  · Talk about it- your feelings and reasons for harming yourself  · Remove any means that you might use to hurt yourself (examples: pills, rope, extension cords, firearm)  · Get professional help from the community (Mental Health, Substance  Abuse, psychological counseling)  · Do not be alone:Call your Safe Contact- someone whom you trust who will be there for you.  · Call your local CRISIS HOTLINE 665-0832 or 036-091-8647  · Call your local Children's Mobile Crisis Response Team [place] (560) 310-3808 or www.[employer]  · Call the toll free National Suicide Prevention Hotlines   · National Suicide Prevention Lifeline 605-084-QJST (4302)  · National Hope Line Network 800-SUICIDE (696-8749)

## 2019-04-15 ENCOUNTER — OFFICE VISIT (OUTPATIENT)
Dept: ENDOCRINOLOGY | Facility: MEDICAL CENTER | Age: 49
End: 2019-04-15
Payer: MEDICAID

## 2019-04-15 VITALS
DIASTOLIC BLOOD PRESSURE: 70 MMHG | HEART RATE: 82 BPM | SYSTOLIC BLOOD PRESSURE: 102 MMHG | HEIGHT: 65 IN | OXYGEN SATURATION: 96 % | WEIGHT: 235 LBS | BODY MASS INDEX: 39.15 KG/M2

## 2019-04-15 DIAGNOSIS — E66.9 OBESITY (BMI 35.0-39.9 WITHOUT COMORBIDITY): ICD-10-CM

## 2019-04-15 DIAGNOSIS — Z85.850 HISTORY OF PAPILLARY ADENOCARCINOMA OF THYROID: ICD-10-CM

## 2019-04-15 DIAGNOSIS — R73.01 IMPAIRED FASTING GLUCOSE: ICD-10-CM

## 2019-04-15 DIAGNOSIS — E55.9 VITAMIN D DEFICIENCY: ICD-10-CM

## 2019-04-15 DIAGNOSIS — E89.0 HYPOTHYROIDISM, POSTSURGICAL: ICD-10-CM

## 2019-04-15 DIAGNOSIS — E83.51 HYPOCALCEMIA: ICD-10-CM

## 2019-04-15 PROCEDURE — 99214 OFFICE O/P EST MOD 30 MIN: CPT | Performed by: PHYSICIAN ASSISTANT

## 2019-04-15 NOTE — PROGRESS NOTES
Endocrinology Clinic Progress Note  PCP: Shelby Bell M.D.    HPI:  Jayashree Carrasco is a 48 y.o. old patient who comes in today for review of endocrine problems.    Previously followed by Dr. Storm 12/2017-      1. History of papillary adenocarcinoma of thyroid  Papillary thyroid carcinoma (CMS-HCC)  · Status post total thyroidectomy in 1999, and radioactive iodine ablation in 1999 and 2001  · Diagnostic whole body scan in 2004 was negative  NO INCREASED ACTIVITY WITHIN THE THYROID BED.   NO REMOTE FOCI OF   INCREASED ACTIVITY SUSPICIOUS FOR METASTATIC DISEASE.         Thyroid ultrasound: 1/2018   Thyroid bed appears normal.  There is no adenopathy or mass. There is no residual tissue.    Patient denies symptoms of sensation of a throat mass, voice changes, hoarseness, neck pain, or difficulty swallowing, dysphonia, enlarged cervical lymph nodes, sweating, tremors,       2. Vitamin D deficiency  Vitamin D 51644 IU weekly       3. Hypothyroidism, postsurgical  Synthroid 137 mcg 2 tablets daily   Patient takes 1 in the am and 1 in the pm   On empty stomach 1 hour prior to eating    No herbal supplements   No biotin     Patient c/o fatigue waking up tired and weight gain   + snoring at night patient denies ever having a sleep evaluation.  She complains of dry skin and lack of motivation.    Diet:  Salads, chicken (started 1 year ago after her granddaughter was diagnosed with prediabetes.      Patient is going to gym together with her two daughters 5 days a week admits to cardio and weights for the last 3 months.  She has not had any weight loss.      8/15/2018 TSH 2.740, free T4 0.89    4. Obesity (BMI 35.0-39.9 without comorbidity)  4/15/2019- weight 235     5.  Impaired fasting glucose :  3/30/2019 glucose 123  3/28/2019 glucose 117  10/19/2018 glucose 108    According to the patient she has nocturia several times a night.  She was recently started on a medication for overactive bladder.    Grand daughter with  predm   Grandfather with DM     6.  Incidental hypocalcemia during hospitalization 3/30/2019:  3/30/2019 calcium 7.7, albumin 3.3    ROS:  Constitutional: No weight loss,  fever  HEENT: No difficulty with swallowing, change in voice, or swelling in throat area   Cardiac: Hx of cp 3/2019- hospitalized at [facility]  Resp: No shortness of breath  GI: No abdominal pain, nausea, vomiting, or diarrhea   Neuro: No numbness or tinging in feet  Endo: No heat or cold intolerance, no polyuria or polydipsia  All other detailed ROS is otherwise negative       Past Medical History:  Patient Active Problem List    Diagnosis Date Noted   • Infected tooth 03/29/2019     Priority: High   • Nausea vomiting and diarrhea 10/18/2018     Priority: High   • GERD (gastroesophageal reflux disease) 05/30/2017     Priority: High   • Schatzki's ring 10/18/2018     Priority: Medium   • Mild intermittent asthma without complication 05/30/2017     Priority: Low   • Obesity (BMI 35.0-39.9 without comorbidity) 05/30/2017     Priority: Low   • Vitamin D deficiency 04/12/2014     Priority: Low   • Hypothyroidism 06/07/2012     Priority: Low   • Hypothyroidism, postsurgical 04/15/2019   • Vaginal candidiasis 12/18/2018   • History of papillary adenocarcinoma of thyroid 05/30/2017   • Abnormal drug screen 05/02/2017   • Chronic low back pain 04/12/2014   • Chronic neck pain 04/12/2014   • Fibromyalgia 06/07/2012       Family Medical History:   Family History   Problem Relation Age of Onset   • Stroke Mother 40   • Cancer Mother         leukemia   • Cancer Maternal Aunt 72        breast   • Diabetes Maternal Grandfather    • Cancer Cousin 51        bone       Social History:   Social History     Social History   • Marital status: Single     Spouse name: N/A   • Number of children: N/A   • Years of education: N/A     Occupational History   • Not on file.     Social History Main Topics   • Smoking status: Never Smoker   • Smokeless tobacco: Never Used   •  Alcohol use No   • Drug use: No   • Sexual activity: Yes     Partners: Male     Birth control/ protection: Surgical     Other Topics Concern   • Not on file     Social History Narrative   • No narrative on file       Medications:    Current Outpatient Prescriptions:   •  hyoscyamine-maalox plus-lidocaine viscous (GI COCKTAIL), Take 15-30 mL by mouth every 6 hours as needed., Disp: 500 mL, Rfl: 0  •  levothyroxine (SYNTHROID) 137 MCG Tab, Take 137 mcg by mouth 2 Times a Day., Disp: , Rfl:   •  promethazine (PHENERGAN) 25 MG Tab, Take 25 mg by mouth 2 times a day as needed for Nausea/Vomiting., Disp: , Rfl:   •  cetirizine (ZYRTEC) 10 MG Tab, Take 10 mg by mouth every day., Disp: , Rfl:   •  oxybutynin (DITROPAN) 5 MG Tab, Take 5 mg by mouth every day., Disp: , Rfl:   •  clindamycin (CLEOCIN) 300 MG Cap, Take 300 mg by mouth 4 times a day. 7 DAYS, Disp: , Rfl:   •  nortriptyline (PAMELOR) 10 MG Cap, Take 10 mg by mouth every bedtime., Disp: , Rfl: 0  •  lidocaine (XYLOCAINE) 5 % Ointment, Apply 1 Each to affected area(s) as needed. BACK, Disp: , Rfl: 2  •  ibuprofen (MOTRIN) 800 MG Tab, Take 800 mg by mouth every 8 hours as needed., Disp: , Rfl: 5  •  fluticasone (FLONASE) 50 MCG/ACT nasal spray, Spray 2 Sprays in nose every day., Disp: , Rfl: 5  •  tizanidine (ZANAFLEX) 6 MG capsule, Take 6 mg by mouth 2 Times a Day., Disp: , Rfl:   •  oxyCODONE-acetaminophen (PERCOCET) 7.5-325 MG per tablet, Take 1 Tab by mouth every 8 hours as needed., Disp: , Rfl:   •  albuterol (PROVENTIL) 2.5mg/3ml Nebu Soln solution for nebulization, 3 mL by Nebulization route every four hours as needed for Shortness of Breath., Disp: 75 mL, Rfl: 0  •  pantoprazole (PROTONIX) 40 MG Tablet Delayed Response, Take 1 Tab by mouth 2 times a day., Disp: 60 Tab, Rfl: 0  •  albuterol 108 (90 Base) MCG/ACT Aero Soln inhalation aerosol, Inhale 2 Puffs by mouth every 6 hours as needed for Shortness of Breath., Disp: 8.5 g, Rfl: 0  •  vitamin D,  "Ergocalciferol, (DRISDOL) 08510 units Cap capsule, Take 50,000 Units by mouth every Saturday., Disp: , Rfl:     Labs: Reviewed as above     Physical Examination:  Vital signs: /70 (BP Location: Right arm, Patient Position: Sitting, BP Cuff Size: Large adult)   Pulse 82   Ht 1.651 m (5' 5\")   Wt 106.6 kg (235 lb)   LMP 11/21/2015   SpO2 96%   BMI 39.11 kg/m²  Body mass index is 39.11 kg/m².  General: No apparent distress, cooperative  Eyes: No scleral icterus, no discharge, normal eyelids,   Neck: No abnormal masses on inspection, healed thyroid scar negative lymphadenopathy  Resp: Normal effort, clear to auscultation bilaterally  CVS: Regular rate and rhythm, S1 S2 normal, no murmur  Extremities: No lower extremity edema  Abdomen: abdominal obesity present  Musculoskeletal: Normal digits and nails  Skin: No rash on visible skin positive skin tags positive actinic keratosis left neck  Psych: Alert and oriented, normal mood and affect, intact memory and able to make informed decisions.    Assessment and Plan:  1. History of papillary adenocarcinoma of thyroid  Patient has not had any recent follow up will recheck labs for thyroid cancer markers     - THYROGLOBULIN, QT; Future  - ANTITHYROGLOBULIN AB; Future  - US-SOFT TISSUES OF HEAD - NECK; Future    2. Vitamin D deficiency  Rule out Vitamin B12 and Vitamin D deficiency as the contributory/etiology for fatigue.  - VITAMIN D,25 HYDROXY; Future    3. Hypothyroidism, postsurgical  Chronic stable condition managed with current medical regimen   Continue current regimen     Synthroid 137 mcg BID     - TSH; Future  - TRIIDOTHYRONINE; Future  - FREE THYROXINE; Future  - T3 FREE; Future  - Comp Metabolic Panel; Future    4. Obesity (BMI 35.0-39.9 without comorbidity)  Encouraged diet and exercise.   May consider referral to HIP     5. Impaired fasting glucose  Patient has increased symptoms of polyuria and elevated glucose on exam. Discussed will check A1c, CMP "   - VITAMIN B12; Future  - HEMOGLOBIN A1C; Future    6. Hypocalcemia  Evaluation secondary to incidental hypocalcemia during hospitalization. Discussed vitamin d effects on calcium. Patient was recently started on vitamin d replacement   - PTH WITH CALCIUM; Future  - ALBUMIN; Future  - IONIZED CALCIUM; Future  - PHOSPHORUS; Future    Fatigue- I have asked patient to follow up with PCP re: lipids and Sleep apnea evaluation.       Return in about 2 months (around 6/15/2019).    Thank you for allowing me to participate in the care of this patient.  If you have any questions or concerns please do not hesitate to contact me.    Dorothy Canales P.A.-C.    CC:   Shelby Bell M.D.    This note was created using voice recognition software (Dragon). The accuracy of the dictation is limited by the abilities of the software. I have reviewed the note prior to signing, however some errors in grammar and context are still possible. If you have any questions related to this note please do not hesitate to contact our office.

## 2019-05-13 ENCOUNTER — APPOINTMENT (OUTPATIENT)
Dept: RADIOLOGY | Facility: MEDICAL CENTER | Age: 49
End: 2019-05-13
Attending: PHYSICIAN ASSISTANT
Payer: MEDICAID

## 2019-07-08 ENCOUNTER — APPOINTMENT (OUTPATIENT)
Dept: ENDOCRINOLOGY | Facility: MEDICAL CENTER | Age: 49
End: 2019-07-08
Payer: MEDICAID

## 2019-07-22 ENCOUNTER — HOSPITAL ENCOUNTER (EMERGENCY)
Facility: MEDICAL CENTER | Age: 49
End: 2019-07-22
Attending: EMERGENCY MEDICINE
Payer: MEDICAID

## 2019-07-22 VITALS
WEIGHT: 222 LBS | DIASTOLIC BLOOD PRESSURE: 75 MMHG | HEIGHT: 64 IN | OXYGEN SATURATION: 97 % | SYSTOLIC BLOOD PRESSURE: 119 MMHG | TEMPERATURE: 98.5 F | BODY MASS INDEX: 37.9 KG/M2 | RESPIRATION RATE: 16 BRPM | HEART RATE: 79 BPM
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
## 2019-07-22 DIAGNOSIS — S60.012A CONTUSION OF LEFT THUMB WITHOUT DAMAGE TO NAIL, INITIAL ENCOUNTER: ICD-10-CM

## 2019-07-22 DIAGNOSIS — T18.128A FOOD IMPACTION OF ESOPHAGUS, INITIAL ENCOUNTER: ICD-10-CM

## 2019-07-22 PROCEDURE — 29125 APPL SHORT ARM SPLINT STATIC: CPT

## 2019-07-22 PROCEDURE — 99283 EMERGENCY DEPT VISIT LOW MDM: CPT

## 2019-07-22 ASSESSMENT — LIFESTYLE VARIABLES: DO YOU DRINK ALCOHOL: NO

## 2019-07-23 NOTE — ED NOTES
Patient verbalized understanding of discharge instructions, provided with discharge paperwork, gait steady, ambulated independently to BLAIR matamoros.

## 2019-07-23 NOTE — ED TRIAGE NOTES
"Jayashree Carrasco   49 y.o. female   Chief Complaint   Patient presents with   • Swallowed Foreign Body     believes she has a piece of meat stuck in her throat, speaking clearly, has been unable to swallow it down or throw it up for an hour   • Digit Pain     left thumb, happened this weekend, something fell on it      Pt amb to triage with steady gait for above complaint. Speaking in full sentences without difficulty, says she is able to drink water but it is painful.      Pt is alert and oriented, speaking in full sentences, follows commands and responds appropriately to questions. NAD. Resp are even and unlabored.   Pt placed in lobby. Pt educated on triage process. Pt encouraged to alert staff for any changes.    /87   Pulse 82   Temp 37 °C (98.6 °F) (Temporal)   Resp 18   Ht 1.626 m (5' 4\")   Wt 100.7 kg (222 lb 0.1 oz)   LMP 11/21/2015   SpO2 97%   BMI 38.11 kg/m²     "

## 2019-07-23 NOTE — ED PROVIDER NOTES
ER Provider Note     Scribed for Galen Christian M.D. by Jacques Elizabeth. 7/22/2019, 10:25 PM.    Primary Care Provider: Shelby Bell M.D.  Means of Arrival: walk-in   History obtained from: Patient  History limited by: None     CHIEF COMPLAINT  Chief Complaint   Patient presents with   • Swallowed Foreign Body     believes she has a piece of meat stuck in her throat, speaking clearly, has been unable to swallow it down or throw it up for an hour   • Digit Pain     left thumb, happened this weekend, something fell on it       HPI  Jayashree Carrasco is a 49 y.o. female who presents to the Emergency Department after ingestion of a foreign body an hour prior to arrival. The patient states that she ate a piece of tri-tip steak that has been lodged in her esophagus. The patient states that she has had to have her esophagus dilated for the same problem. The patient associates nausea, and vomiting. The patient states that she is allergic to propofol. She also associates thumb pain when she hit her thumb on a fence.    REVIEW OF SYSTEMS  See HPI for further details. All other systems are negative.     PAST MEDICAL HISTORY   has a past medical history of ASTHMA; Cancer (HCC); Chronic low back pain (4/12/2014); Chronic neck pain (4/12/2014); Fibromyalgia; GERD (gastroesophageal reflux disease); Thyroid disease; and Unspecified vitamin D deficiency (4/12/2014).    SURGICAL HISTORY   has a past surgical history that includes cholecystectomy; thyroidectomy; and abdominal hysterectomy total.    SOCIAL HISTORY  Social History   Substance Use Topics   • Smoking status: Never Smoker   • Smokeless tobacco: Never Used   • Alcohol use No      History   Drug Use No       FAMILY HISTORY  Family History   Problem Relation Age of Onset   • Stroke Mother 40   • Cancer Mother         leukemia   • Cancer Maternal Aunt 72        breast   • Diabetes Maternal Grandfather    • Cancer Cousin 51        bone       CURRENT MEDICATIONS  Home  "Medications     Reviewed by Reji Selby R.N. (Registered Nurse) on 07/22/19 at 2138  Med List Status: <None>   Medication Last Dose Status   albuterol (PROVENTIL) 2.5mg/3ml Nebu Soln solution for nebulization  Active   albuterol 108 (90 Base) MCG/ACT Aero Soln inhalation aerosol  Active   cetirizine (ZYRTEC) 10 MG Tab  Active   clindamycin (CLEOCIN) 300 MG Cap  Active   fluticasone (FLONASE) 50 MCG/ACT nasal spray  Active   hyoscyamine-maalox plus-lidocaine viscous (GI COCKTAIL)  Active   ibuprofen (MOTRIN) 800 MG Tab  Active   levothyroxine (SYNTHROID) 137 MCG Tab  Active   lidocaine (XYLOCAINE) 5 % Ointment  Active   nortriptyline (PAMELOR) 10 MG Cap  Active   oxybutynin (DITROPAN) 5 MG Tab  Active   oxyCODONE-acetaminophen (PERCOCET) 7.5-325 MG per tablet  Active   pantoprazole (PROTONIX) 40 MG Tablet Delayed Response  Active   promethazine (PHENERGAN) 25 MG Tab  Active   tizanidine (ZANAFLEX) 6 MG capsule  Active   vitamin D, Ergocalciferol, (DRISDOL) 81788 units Cap capsule  Active                ALLERGIES  Allergies   Allergen Reactions   • Propofol      Hypotension, and severe asthma attack   • Ciprofloxacin Rash     Rxn - about 2012   • Sulfa Drugs Rash     Rxn - about 2012       PHYSICAL EXAM  VITAL SIGNS: /87   Pulse 82   Temp 37 °C (98.6 °F) (Temporal)   Resp 18   Ht 1.626 m (5' 4\")   Wt 100.7 kg (222 lb 0.1 oz)   LMP 11/21/2015   SpO2 97%   BMI 38.11 kg/m²      Constitutional: Alert in no apparent distress.  HENT: No signs of trauma, Bilateral external ears normal, Nose normal.   Eyes: Pupils are equal and reactive, Conjunctiva normal, Non-icteric.   Neck: Normal range of motion, No tenderness, Supple, No stridor.   Lymphatic: No lymphadenopathy noted.   Cardiovascular: Regular rate and rhythm, no murmurs.   Thorax & Lungs: Normal breath sounds, No respiratory distress, No wheezing, No chest tenderness.   Abdomen: Bowel sounds normal, Soft, No tenderness, No masses, No pulsatile " masses. No peritoneal signs.  Skin: Warm, Dry, No erythema, No rash.   Back: No bony tenderness, No CVA tenderness.   Extremities: Intact distal pulses, No edema, No cyanosis. Ecchymotic thumb with tenderness over the IP joint and brisk capillary refill  Musculoskeletal: Good range of motion in all major joints. No tenderness to palpation or major deformities noted.   Neurologic: Alert , Normal motor function, Normal sensory function, No focal deficits noted.   Psychiatric: Affect normal, Judgment normal, Mood normal.     COURSE & MEDICAL DECISION MAKING  Pertinent Labs & Imaging studies reviewed. (See chart for details)    This is a 49 y.o. female that presents with a food impaction that is now resolved when I saw the patient.  She says she can swallow without any difficulty.  She says she is felt to the past.  She also did have an episode of striking her left thumb while working a few days ago.  She declines x-ray at this time.  I will place in a thumb spica.  There is no snuffbox tenderness..  I recommended a x-ray as soon as possible.    10:25 PM - Patient seen and examined at bedside. The patient will be referred to digestive health and her thumb will be splinted. The patient was given return precautions. All of her questions were answered and she is comfortable being discharged at this time.      The patient will return for new or worsening symptoms and is stable at the time of discharge.    The patient is referred to a primary physician for blood pressure management, diabetic screening, and for all other preventative health concerns.    DISPOSITION:  Patient will be discharged home in stable condition.    FOLLOW UP:  Shelby Bell M.D.  [address]    [facility] [address]  [phone]    In 2 days        OUTPATIENT MEDICATIONS:  New Prescriptions    No medications on file         FINAL IMPRESSION  1. Contusion of left thumb without damage to nail, initial encounter    2. Food impaction of esophagus,  initial encounter          I, Jacques Elizabeth (Scribe), am scribing for, and in the presence of, Galen Christian M.D..    Electronically signed by: Jacques Elizabeth (Jim), 7/22/2019    IGalen M.D. personally performed the services described in this documentation, as scribed by Jacques Elizabeth in my presence, and it is both accurate and complete.     The note accurately reflects work and decisions made by me.  Galen Christian  7/23/2019  2:04 AM

## 2019-07-23 NOTE — ED NOTES
Pt ambulated steady to ED room blue 19. Pt changed into gown, no acute distress at this time, pt speaking in full sentences, airway patent. Awaiting ERP to see.

## 2019-07-23 NOTE — ED NOTES
"Pt states to RN \"meat just went down, I'm going to go home.\" Dr. Christian notified of pt wanting to go- per MD pt to wait. MD on way to room now.   "

## 2019-08-23 ENCOUNTER — APPOINTMENT (OUTPATIENT)
Dept: ENDOCRINOLOGY | Facility: MEDICAL CENTER | Age: 49
End: 2019-08-23
Payer: MEDICAID

## 2019-09-06 ENCOUNTER — APPOINTMENT (OUTPATIENT)
Dept: RADIOLOGY | Facility: IMAGING CENTER | Age: 49
End: 2019-09-06
Attending: NURSE PRACTITIONER
Payer: MEDICAID

## 2019-09-06 ENCOUNTER — OFFICE VISIT (OUTPATIENT)
Dept: URGENT CARE | Facility: CLINIC | Age: 49
End: 2019-09-06
Payer: MEDICAID

## 2019-09-06 VITALS
DIASTOLIC BLOOD PRESSURE: 82 MMHG | HEIGHT: 63 IN | SYSTOLIC BLOOD PRESSURE: 128 MMHG | BODY MASS INDEX: 38.13 KG/M2 | RESPIRATION RATE: 12 BRPM | OXYGEN SATURATION: 97 % | HEART RATE: 64 BPM | WEIGHT: 215.2 LBS | TEMPERATURE: 97.8 F

## 2019-09-06 DIAGNOSIS — M79.671 FOOT PAIN, RIGHT: ICD-10-CM

## 2019-09-06 DIAGNOSIS — M25.571 ACUTE RIGHT ANKLE PAIN: ICD-10-CM

## 2019-09-06 DIAGNOSIS — S82.61XA CLOSED DISPLACED FRACTURE OF LATERAL MALLEOLUS OF RIGHT FIBULA, INITIAL ENCOUNTER: ICD-10-CM

## 2019-09-06 PROCEDURE — 73610 X-RAY EXAM OF ANKLE: CPT | Mod: RT | Performed by: FAMILY MEDICINE

## 2019-09-06 PROCEDURE — 73630 X-RAY EXAM OF FOOT: CPT | Mod: RT | Performed by: FAMILY MEDICINE

## 2019-09-06 PROCEDURE — 99214 OFFICE O/P EST MOD 30 MIN: CPT | Performed by: NURSE PRACTITIONER

## 2019-09-06 RX ORDER — IBUPROFEN 600 MG/1
600 TABLET ORAL EVERY 6 HOURS PRN
Qty: 30 TAB | Refills: 0 | Status: SHIPPED | OUTPATIENT
Start: 2019-09-06 | End: 2020-09-23

## 2019-09-06 ASSESSMENT — ENCOUNTER SYMPTOMS
MUSCLE WEAKNESS: 0
WEAKNESS: 0
FALLS: 1
FEVER: 0
BACK PAIN: 0
INABILITY TO BEAR WEIGHT: 1
SENSORY CHANGE: 1
NECK PAIN: 0
NUMBNESS: 1
LOSS OF MOTION: 0

## 2019-09-07 NOTE — PROGRESS NOTES
Subjective:     Jayashree Carrasco is a 49 y.o. female who presents for Ankle Pain (x 1 day.  Pt. states that she tripped on an uneven side walk and landed on R ankle.  She is having pain, numbness and swelling. )      Patient tripped over the sidewalk today, causing her right ankle to roll  (Inversion), which then caused her to fall.  Pain 5/10, ICE, percocet 3 hours ago. [place] foot and 1-2 tooes numb. [place] a pop. Pain in foot with pressure. Limp, unable to fully put foot flat. History of catalino in same ankle.              Ankle Injury    The incident occurred at home. The injury mechanism was a twisting injury and an inversion injury. The pain is present in the right ankle and right foot. The pain is at a severity of 5/10. The pain is moderate. The pain has been constant since onset. Associated symptoms include an inability to bear weight and numbness. Pertinent negatives include no loss of motion or muscle weakness. She has tried elevation and ice (pain medication) for the symptoms. The treatment provided moderate relief.       Past Medical History:   Diagnosis Date   • ASTHMA    • Cancer (HCC)     papillary thyroid, removed in 1999, recurrence 2000 treated with radiation   • Chronic low back pain 4/12/2014    Takes 1-2 perc 10/325 usu only 1-2 times/day Last took perc a week rx from [facility] ( [facility] [place])    • Chronic neck pain 4/12/2014   • Fibromyalgia    • GERD (gastroesophageal reflux disease)     w/ esophageal dilation X3   • Thyroid disease    • Unspecified vitamin D deficiency 4/12/2014       Past Surgical History:   Procedure Laterality Date   • ABDOMINAL HYSTERECTOMY TOTAL     • CHOLECYSTECTOMY     • THYROIDECTOMY         Social History     Socioeconomic History   • Marital status: Single     Spouse name: Not on file   • Number of children: Not on file   • Years of education: Not on file   • Highest education level: Not on file   Occupational History   • Not on file   Social Needs   • Financial  resource strain: Not on file   • Food insecurity:     Worry: Not on file     Inability: Not on file   • Transportation needs:     Medical: Not on file     Non-medical: Not on file   Tobacco Use   • Smoking status: Never Smoker   • Smokeless tobacco: Never Used   Substance and Sexual Activity   • Alcohol use: No     Alcohol/week: 0.0 oz   • Drug use: No   • Sexual activity: Yes     Partners: Male     Birth control/protection: Surgical   Lifestyle   • Physical activity:     Days per week: Not on file     Minutes per session: Not on file   • Stress: Not on file   Relationships   • Social connections:     Talks on phone: Not on file     Gets together: Not on file     Attends [employer] service: Not on file     Active member of club or organization: Not on file     Attends meetings of clubs or organizations: Not on file     Relationship status: Not on file   • Intimate partner violence:     Fear of current or ex partner: Not on file     Emotionally abused: Not on file     Physically abused: Not on file     Forced sexual activity: Not on file   Other Topics Concern   • Not on file   Social History Narrative   • Not on file        Family History   Problem Relation Age of Onset   • Stroke Mother 40   • Cancer Mother         leukemia   • Cancer Maternal Aunt 72        breast   • Diabetes Maternal Grandfather    • Cancer Cousin 51        bone        Allergies   Allergen Reactions   • Propofol      Hypotension, and severe asthma attack   • Ciprofloxacin Rash     Rxn - about 2012   • Sulfa Drugs Rash     Rxn - about 2012       Review of Systems   Constitutional: Negative for fever.   Musculoskeletal: Positive for falls and joint pain. Negative for back pain and neck pain.   Skin:        No abrasions. Increased swelling to outer right ankle.    Neurological: Positive for sensory change and numbness. Negative for weakness.   All other systems reviewed and are negative.       Objective:   /82   Pulse 64   Temp 36.6 °C (97.8  "°F) (Temporal)   Resp 12   Ht 1.6 m (5' 3\")   Wt 97.6 kg (215 lb 3.2 oz)   LMP 11/21/2015   SpO2 97%   BMI 38.12 kg/m²     Physical Exam   Constitutional: She is oriented to person, place, and time. She appears well-developed. No distress.   HENT:   Head: Normocephalic and atraumatic.   Right Ear: External ear normal.   Left Ear: External ear normal.   Nose: Nose normal.   Mouth/Throat: Oropharynx is clear and moist.   Eyes: Conjunctivae and EOM are normal.   Neck: Normal range of motion.   Cardiovascular: Normal rate.   Pulses:       Dorsalis pedis pulses are 2+ on the right side.   Pulmonary/Chest: Effort normal.   Musculoskeletal: She exhibits edema and tenderness.        Right ankle: She exhibits swelling. She exhibits normal pulse. Tenderness. Lateral malleolus and AITFL tenderness found. No proximal fibula tenderness found. Achilles tendon exhibits no pain and no defect.        Right foot: There is tenderness and bony tenderness. There is normal range of motion, normal capillary refill, no deformity and no laceration.   Lateral, AITFL, and lateral mid foot pain to palpation. Reported decreased sensation to touch over distal lateral dorsal foot. Moderate swelling to lateral malleolus. Limited ROM due to pain. Negative squeeze test.    Feet:   Right Foot:   Skin Integrity: Negative for skin breakdown or erythema.   Neurological: She is alert and oriented to person, place, and time. She has normal strength. GCS eye subscore is 4. GCS verbal subscore is 5. GCS motor subscore is 6.   Skin: Skin is warm and dry.   Psychiatric: She has a normal mood and affect. Her speech is normal and behavior is normal. Judgment and thought content normal.   Vitals reviewed.      Assessment/Plan:   1. Closed displaced fracture of lateral malleolus of right fibula, initial encounter  - DX-ANKLE 3+ VIEWS RIGHT; Future  - ibuprofen (MOTRIN) 600 MG Tab; Take 1 Tab by mouth every 6 hours as needed.  Dispense: 30 Tab; Refill: 0  - " REFERRAL TO ORTHOPEDICS    2. Foot pain, right  - DX-FOOT-COMPLETE 3+ RIGHT; Future  -Take prescribed percocet as directed for severe pain.   -Crutches.  -RICE Therapy: Rest, Ice, Compression, Elevation.     There is minimally displaced fracture involving the tip of the lateral malleolus.No fractures in the foot. Lateral malleolar tip fracture is detailed separately.    Follow up emergently for increased swelling or pressure in ankle of foot, uncontrolled pain, coldness in foot.     Differential diagnosis, natural history, supportive care, and indications for immediate follow-up discussed.

## 2019-09-07 NOTE — PATIENT INSTRUCTIONS
Fibular Ankle Fracture Treated With or Without Immobilization, Adult  A fibular fracture at your ankle is a break (fracture) bone in the smallest of the two bones in your lower leg, located on the outside of your leg (fibula) close to the area at your ankle joint.  CAUSES  · Rolling your ankle.  · Twisting your ankle.  · Extreme flexing or extending of your foot.  · Severe force on your ankle as when falling from a distance.  RISK FACTORS  · Jumping activities.  · Participation in sports.  · Osteoporosis.  · Advanced age.  · Previous ankle injuries.  SIGNS AND SYMPTOMS  · Pain.  · Swelling.  · Inability to put weight on injured ankle.  · Bruising.  · Bone deformities at site of injury.  DIAGNOSIS   This fracture is diagnosed with the help of an X-ray exam.  TREATMENT   If the fractured bone did not move out of place it usually will heal without problems and does casting or splinting. If immobilization is needed for comfort or the fractured bone moved out of place and will not heal properly with immobilization, a cast or splint will be used.  HOME CARE INSTRUCTIONS   · Apply ice to the area of injury:  ¨ Put ice in a plastic bag.  ¨ Place a towel between your skin and the bag.  ¨ Leave the ice on for 20 minutes, 2-3 times a day.  · Use crutches as directed. Resume walking without crutches as directed by your health care provider.  · Only take over-the-counter or prescription medicines for pain, discomfort, or fever as directed by your health care provider.  · If you have a removable splint or boot, do not remove the boot unless directed by your health care provider.  SEEK MEDICAL CARE IF:   · You have continued pain or more swelling  · The medications do not control the pain.  SEEK IMMEDIATE MEDICAL CARE IF:  · You develop severe pain in the leg or foot.  · Your skin or nails below the injury turn blue or grey or feel cold or numb.  MAKE SURE YOU:   · Understand these instructions.  · Will watch your  condition.  · Will get help right away if you are not doing well or get worse.  This information is not intended to replace advice given to you by your health care provider. Make sure you discuss any questions you have with your health care provider.  Document Released: 12/18/2006 Document Revised: 01/08/2016 Document Reviewed: 07/30/2014  [employer] Interactive Patient Education © 2017 [employer]  Ankle Fracture  A fracture is a break in a bone. The ankle joint is made up of three bones. These include the lower (distal)sections of your lower leg bones, called the tibia and fibula, along with a bone in your foot, called the talus. Depending on how bad the break is and if more than one ankle joint bone is broken, a cast or splint is used to protect and keep your injured bone from moving while it heals. Sometimes, surgery is required to help the fracture heal properly.  There are two general types of fractures:  · Stable fracture. This includes a single fracture line through one bone, with no injury to ankle ligaments. A fracture of the talus that does not have any displacement (movement of the bone on either side of the fracture line) is also stable.  · Unstable fracture. This includes more than one fracture line through one or more bones in the ankle joint. It also includes fractures that have displacement of the bone on either side of the fracture line.  What are the causes?  · A direct blow to the ankle.  · Quickly and severely twisting your ankle.  · Trauma, such as a car accident or falling from a significant height.  What increases the risk?  You may be at a higher risk of ankle fracture if:  · You have certain medical conditions.  · You are involved in high-impact sports.  · You are involved in a high-impact car accident.  What are the signs or symptoms?  · Tender and swollen ankle.  · Bruising around the injured ankle.  · Pain on movement of the ankle.  · Difficulty walking or putting weight on the  ankle.  · A cold foot below the site of the ankle injury. This can occur if the blood vessels passing through your injured ankle were also damaged.  · Numbness in the foot below the site of the ankle injury.  How is this diagnosed?  An ankle fracture is usually diagnosed with a physical exam and X-rays. A CT scan may also be required for complex fractures.  How is this treated?  Stable fractures are treated with a cast or splint and using crutches to avoid putting weight on your injured ankle. This is followed by an ankle strengthening program. Some patients require a special type of cast, depending on other medical problems they may have. Unstable fractures require surgery to ensure the bones heal properly. Your health care provider will tell you what type of fracture you have and the best treatment for your condition.  Follow these instructions at home:  · Review correct crutch use with your health care provider and use your crutches as directed. Safe use of crutches is extremely important. Misuse of crutches can cause you to fall or cause injury to nerves in your hands or armpits.  · Do not put weight or pressure on the injured ankle until directed by your health care provider.  · To lessen the swelling, keep the injured leg elevated while sitting or lying down.  · Apply ice to the injured area:  ¨ Put ice in a plastic bag.  ¨ Place a towel between your cast and the bag.  ¨ Leave the ice on for 20 minutes, 2-3 times a day.  · If you have a plaster or fiberglass cast:  ¨ Do not try to scratch the skin under the cast with any objects. This can increase your risk of skin infection.  ¨ Check the skin around the cast every day. You may put lotion on any red or sore areas.  ¨ Keep your cast dry and clean.  · If you have a plaster splint:  ¨ Wear the splint as directed.  ¨ You may loosen the elastic around the splint if your toes become numb, tingle, or turn cold or blue.  · Do not put pressure on any part of your cast  or splint; it may break. Rest your cast only on a pillow the first 24 hours until it is fully hardened.  · Your cast or splint can be protected during bathing with a plastic bag sealed to your skin with medical tape. Do not lower the cast or splint into water.  · Take medicines as directed by your health care provider. Only take over-the-counter or prescription medicines for pain, discomfort, or fever as directed by your health care provider.  · Do not drive a vehicle until your health care provider specifically tells you it is safe to do so.  · If your health care provider has given you a follow-up appointment, it is very important to keep that appointment. Not keeping the appointment could result in a chronic or permanent injury, pain, and disability. If you have any problem keeping the appointment, call the facility for assistance.  Contact a health care provider if:  You develop increased swelling or discomfort.  Get help right away if:  · Your cast gets damaged or breaks.  · You have continued severe pain.  · You develop new pain or swelling after the cast was put on.  · Your skin or toenails below the injury turn blue or gray.  · Your skin or toenails below the injury feel cold, numb, or have loss of sensitivity to touch.  · There is a bad smell or pus draining from under the cast.  This information is not intended to replace advice given to you by your health care provider. Make sure you discuss any questions you have with your health care provider.  Document Released: 12/15/2001 Document Revised: 05/31/2017 Document Reviewed: 07/17/2014  [employer] Interactive Patient Education © 2017 [employer]

## 2019-11-19 RX ORDER — LEVOTHYROXINE SODIUM 137 UG/1
137 TABLET ORAL 2 TIMES DAILY
Qty: 96 TAB | Refills: 3 | Status: SHIPPED | OUTPATIENT
Start: 2019-11-19 | End: 2021-10-13 | Stop reason: SDUPTHER

## 2019-11-19 NOTE — TELEPHONE ENCOUNTER
**Last office visit 4/15/19**    Was the patient seen in the last year in this department? Yes    Does patient have an active prescription for medications requested? Yes    Received Request Via: Pharmacy    Day supply: 90

## 2019-12-09 ENCOUNTER — HOSPITAL ENCOUNTER (OUTPATIENT)
Dept: RADIOLOGY | Facility: MEDICAL CENTER | Age: 49
End: 2019-12-09
Attending: NURSE PRACTITIONER
Payer: MEDICAID

## 2019-12-09 DIAGNOSIS — M53.3 SACRALGIA: ICD-10-CM

## 2019-12-09 PROCEDURE — 72220 X-RAY EXAM SACRUM TAILBONE: CPT

## 2019-12-09 PROCEDURE — 72110 X-RAY EXAM L-2 SPINE 4/>VWS: CPT

## 2020-07-05 ENCOUNTER — OFFICE VISIT (OUTPATIENT)
Dept: URGENT CARE | Facility: CLINIC | Age: 50
End: 2020-07-05
Payer: MEDICAID

## 2020-07-05 VITALS
BODY MASS INDEX: 38.98 KG/M2 | DIASTOLIC BLOOD PRESSURE: 72 MMHG | HEIGHT: 63 IN | SYSTOLIC BLOOD PRESSURE: 130 MMHG | HEART RATE: 86 BPM | TEMPERATURE: 98.7 F | WEIGHT: 220 LBS | OXYGEN SATURATION: 98 %

## 2020-07-05 DIAGNOSIS — J01.01 ACUTE RECURRENT MAXILLARY SINUSITIS: ICD-10-CM

## 2020-07-05 DIAGNOSIS — N30.00 ACUTE CYSTITIS WITHOUT HEMATURIA: ICD-10-CM

## 2020-07-05 PROCEDURE — 99214 OFFICE O/P EST MOD 30 MIN: CPT | Performed by: NURSE PRACTITIONER

## 2020-07-05 RX ORDER — FLUCONAZOLE 100 MG/1
200 TABLET ORAL DAILY
Qty: 2 TAB | Refills: 0 | Status: SHIPPED | OUTPATIENT
Start: 2020-07-05 | End: 2020-07-07

## 2020-07-05 RX ORDER — CEPHALEXIN 500 MG/1
500 CAPSULE ORAL 2 TIMES DAILY
Qty: 14 CAP | Refills: 0 | Status: SHIPPED | OUTPATIENT
Start: 2020-07-05 | End: 2020-07-11

## 2020-07-05 RX ORDER — PROMETHAZINE HYDROCHLORIDE 25 MG/1
25 TABLET ORAL EVERY 6 HOURS PRN
Qty: 30 TAB | Refills: 0 | Status: SHIPPED | OUTPATIENT
Start: 2020-07-05 | End: 2020-09-23

## 2020-07-05 ASSESSMENT — ENCOUNTER SYMPTOMS
ABDOMINAL PAIN: 0
SINUS PAIN: 1
CHILLS: 0
FEVER: 0
NAUSEA: 1
SHORTNESS OF BREATH: 0
HEADACHES: 1
MYALGIAS: 0
SORE THROAT: 1
ORTHOPNEA: 0
PALPITATIONS: 0
VOMITING: 0
COUGH: 0

## 2020-07-05 ASSESSMENT — FIBROSIS 4 INDEX: FIB4 SCORE: 1.62

## 2020-07-05 NOTE — PROGRESS NOTES
Subjective:   Jayashree Carrasco is a 50 y.o. female who presents for Jaw Pain (x 6 days / RT ear pain / Possible UTI)       HPI  Pt presents for evaluation of a new problem, reports right ear, sinus, and jaw pain.  Is associated with a right-sided headache over the past week.  She denies any fevers, chills, chest pain, shortness of breath.  Reports she is prone to sinus infections, has seen an ENT in the past.  Has not had her annual Kenalog shot this year per her report, which increases the frequency of her sinus infections.  She also reports of right lower back pain and urinary frequency and hesitancy.  This is been associated with nausea, no vomiting.  Reports she has a history of repeated UTIs, of which she tends to feel nauseous and vomit with.  Denies any known ill contacts.  Does travel for work.  She has tried taking cranberry juice and over-the-counter anti-inflammatories with minimal relief.     Review of Systems   Constitutional: Positive for malaise/fatigue. Negative for chills and fever.   HENT: Positive for congestion, ear pain, sinus pain and sore throat. Negative for ear discharge.    Respiratory: Negative for cough and shortness of breath.    Cardiovascular: Negative for chest pain, palpitations and orthopnea.   Gastrointestinal: Positive for nausea. Negative for abdominal pain and vomiting.   Genitourinary: Positive for dysuria.   Musculoskeletal: Negative for myalgias.   Neurological: Positive for headaches.   All other systems reviewed and are negative.      MEDS:   Current Outpatient Medications:   •  levothyroxine (SYNTHROID) 137 MCG Tab, Take 1 Tab by mouth 2 Times a Day., Disp: 96 Tab, Rfl: 3  •  ibuprofen (MOTRIN) 600 MG Tab, Take 1 Tab by mouth every 6 hours as needed., Disp: 30 Tab, Rfl: 0  •  promethazine (PHENERGAN) 25 MG Tab, Take 25 mg by mouth 2 times a day as needed for Nausea/Vomiting., Disp: , Rfl:   •  cetirizine (ZYRTEC) 10 MG Tab, Take 10 mg by mouth every day., Disp: , Rfl:   •   "oxybutynin (DITROPAN) 5 MG Tab, Take 5 mg by mouth every day., Disp: , Rfl:   •  nortriptyline (PAMELOR) 10 MG Cap, Take 10 mg by mouth every bedtime., Disp: , Rfl: 0  •  lidocaine (XYLOCAINE) 5 % Ointment, Apply 1 Each to affected area(s) as needed. BACK, Disp: , Rfl: 2  •  ibuprofen (MOTRIN) 800 MG Tab, Take 800 mg by mouth every 8 hours as needed., Disp: , Rfl: 5  •  fluticasone (FLONASE) 50 MCG/ACT nasal spray, Spray 2 Sprays in nose every day., Disp: , Rfl: 5  •  tizanidine (ZANAFLEX) 6 MG capsule, Take 6 mg by mouth 2 Times a Day., Disp: , Rfl:   •  oxyCODONE-acetaminophen (PERCOCET) 7.5-325 MG per tablet, Take 1 Tab by mouth every 8 hours as needed., Disp: , Rfl:   •  albuterol (PROVENTIL) 2.5mg/3ml Nebu Soln solution for nebulization, 3 mL by Nebulization route every four hours as needed for Shortness of Breath., Disp: 75 mL, Rfl: 0  •  pantoprazole (PROTONIX) 40 MG Tablet Delayed Response, Take 1 Tab by mouth 2 times a day., Disp: 60 Tab, Rfl: 0  •  albuterol 108 (90 Base) MCG/ACT Aero Soln inhalation aerosol, Inhale 2 Puffs by mouth every 6 hours as needed for Shortness of Breath., Disp: 8.5 g, Rfl: 0  •  vitamin D, Ergocalciferol, (DRISDOL) 72121 units Cap capsule, Take 50,000 Units by mouth every Saturday., Disp: , Rfl:   •  hyoscyamine-maalox plus-lidocaine viscous (GI COCKTAIL), Take 15-30 mL by mouth every 6 hours as needed. (Patient not taking: Reported on 7/5/2020), Disp: 500 mL, Rfl: 0  •  clindamycin (CLEOCIN) 300 MG Cap, Take 300 mg by mouth 4 times a day. 7 DAYS, Disp: , Rfl:   ALLERGIES:   Allergies   Allergen Reactions   • Propofol      Hypotension, and severe asthma attack   • Ciprofloxacin Rash     Rxn - about 2012   • Sulfa Drugs Rash     Rxn - about 2012       Patient's PMH, SocHx, SurgHx, FamHx, Drug allergies and medications were reviewed.     Objective:   /72   Pulse 86   Temp 37.1 °C (98.7 °F) (Temporal)   Ht 1.6 m (5' 3\")   Wt 99.8 kg (220 lb)   LMP 11/21/2015   SpO2 98%  "  BMI 38.97 kg/m²     Physical Exam   General: Alert and oriented X3, well-developed, well-nourished, in no apparent distress.  Skin:  Warm and dry with good turgor. No rashes or suspicious lesions in visible areas.  Eyes: PERRL, conjunctiva and sclera clear, lids normal.  HEENT: Head normocephalic, nontraumatic; right sinuses tender with palpation, TMs clear, nares patent with thick mucus present, oropharynx clear, lips moist and without lesions.  Neck: Trachea midline, no masses or lymphadenopathy, no JVD.  Thyroid: Normal consistency and size, without masses, nodules, or tenderness.  Car: Regular rate and rhythm without murmur, rub, or gallop.  Cap refill <2 seconds.  Lungs: Respirations unlabored, clear to auscultation with equal breath sounds bilaterally. No wheezes, rales or rhonchi.  Abdomen: Normoactive BS x4, soft, non-tender, no hepatosplenomegaly or masses noted.  No CVA tenderness.  Extremities: Symmetrical without deformities or malformations bilaterally, normal ROM and movement; no cyanosis, clubbing or edema.    UA in office:  Gluc: Negative  Logan: Negative  Ket: Negative  S.020  Blood: Negative  pH: 6.0  Protein: Negative  Uro: 0.2 EU/dL  Nit: Positive/ Negative  Franco: Moderate    Assessment/Plan:   Assessment      1. Acute recurrent maxillary sinusitis  - fluconazole (DIFLUCAN) 100 MG Tab; Take 2 Tabs by mouth every day for 2 doses.  Dispense: 2 Tab; Refill: 0  - promethazine (PHENERGAN) 25 MG Tab; Take 1 Tab by mouth every 6 hours as needed for Nausea/Vomiting.  Dispense: 30 Tab; Refill: 0  - cephALEXin (KEFLEX) 500 MG Cap; Take 1 Cap by mouth 2 times a day for 7 days.  Dispense: 14 Cap; Refill: 0    2. Acute cystitis without hematuria  - fluconazole (DIFLUCAN) 100 MG Tab; Take 2 Tabs by mouth every day for 2 doses.  Dispense: 2 Tab; Refill: 0  - promethazine (PHENERGAN) 25 MG Tab; Take 1 Tab by mouth every 6 hours as needed for Nausea/Vomiting.  Dispense: 30 Tab; Refill: 0  - cephALEXin (KEFLEX)  500 MG Cap; Take 1 Cap by mouth 2 times a day for 7 days.  Dispense: 14 Cap; Refill: 0    Reviewed UA that was performed in office today.  Begin medications as listed, push fluids and cranberry tablets.  Due to comminution of acute sinusitis as well as urinary tract infection, in addition to her listed allergies, chose cephalosporin for treatment.  Supportive care options also discussed.  Differential diagnosis, natural history, supportive care, and indications for immediate follow-up were discussed.      Return to urgent care clinic or PCP if current symptoms do not improve and/or worsening symptoms occur. Advised of signs and symptoms which would warrant further evaluation and /or emergent evaluation in ER.  All questions answered and the patient agrees to the plan of care.

## 2020-07-11 ENCOUNTER — OFFICE VISIT (OUTPATIENT)
Dept: URGENT CARE | Facility: CLINIC | Age: 50
End: 2020-07-11
Payer: MEDICAID

## 2020-07-11 VITALS
BODY MASS INDEX: 35.26 KG/M2 | HEIGHT: 63 IN | DIASTOLIC BLOOD PRESSURE: 72 MMHG | HEART RATE: 97 BPM | RESPIRATION RATE: 14 BRPM | SYSTOLIC BLOOD PRESSURE: 130 MMHG | WEIGHT: 199 LBS | OXYGEN SATURATION: 96 % | TEMPERATURE: 98.4 F

## 2020-07-11 DIAGNOSIS — J01.01 ACUTE RECURRENT MAXILLARY SINUSITIS: ICD-10-CM

## 2020-07-11 DIAGNOSIS — T36.95XA ANTIBIOTIC-INDUCED YEAST INFECTION: ICD-10-CM

## 2020-07-11 DIAGNOSIS — B37.9 ANTIBIOTIC-INDUCED YEAST INFECTION: ICD-10-CM

## 2020-07-11 PROCEDURE — 99214 OFFICE O/P EST MOD 30 MIN: CPT | Performed by: PHYSICIAN ASSISTANT

## 2020-07-11 RX ORDER — DOXYCYCLINE HYCLATE 100 MG
100 TABLET ORAL 2 TIMES DAILY
Qty: 14 TAB | Refills: 0 | Status: SHIPPED | OUTPATIENT
Start: 2020-07-11 | End: 2020-07-18

## 2020-07-11 RX ORDER — FLUCONAZOLE 150 MG/1
150 TABLET ORAL ONCE
Qty: 1 TAB | Refills: 0 | Status: SHIPPED | OUTPATIENT
Start: 2020-07-11 | End: 2020-07-11

## 2020-07-11 ASSESSMENT — ENCOUNTER SYMPTOMS
PHOTOPHOBIA: 0
PALPITATIONS: 0
NAUSEA: 0
HEADACHES: 1
DIARRHEA: 0
VOMITING: 0
SINUS PAIN: 1
BRUISES/BLEEDS EASILY: 0
COUGH: 0
BLURRED VISION: 0
FEVER: 0
DIZZINESS: 0
ABDOMINAL PAIN: 0
CHILLS: 0
SHORTNESS OF BREATH: 0
MYALGIAS: 0
SORE THROAT: 1

## 2020-07-11 ASSESSMENT — FIBROSIS 4 INDEX: FIB4 SCORE: 1.62

## 2020-07-11 NOTE — PATIENT INSTRUCTIONS
Sinusitis, Adult  Sinusitis is inflammation of your sinuses. Sinuses are hollow spaces in the bones around your face. Your sinuses are located:  · Around your eyes.  · In the middle of your forehead.  · Behind your nose.  · In your cheekbones.  Mucus normally drains out of your sinuses. When your nasal tissues become inflamed or swollen, mucus can become trapped or blocked. This allows bacteria, viruses, and fungi to grow, which leads to infection. Most infections of the sinuses are caused by a virus.  Sinusitis can develop quickly. It can last for up to 4 weeks (acute) or for more than 12 weeks (chronic). Sinusitis often develops after a cold.  What are the causes?  This condition is caused by anything that creates swelling in the sinuses or stops mucus from draining. This includes:  · Allergies.  · Asthma.  · Infection from bacteria or viruses.  · Deformities or blockages in your nose or sinuses.  · Abnormal growths in the nose (nasal polyps).  · Pollutants, such as chemicals or irritants in the air.  · Infection from fungi (rare).  What increases the risk?  You are more likely to develop this condition if you:  · Have a weak body defense system (immune system).  · Do a lot of swimming or diving.  · Overuse nasal sprays.  · Smoke.  What are the signs or symptoms?  The main symptoms of this condition are pain and a feeling of pressure around the affected sinuses. Other symptoms include:  · Stuffy nose or congestion.  · Thick drainage from your nose.  · Swelling and warmth over the affected sinuses.  · Headache.  · Upper toothache.  · A cough that may get worse at night.  · Extra mucus that collects in the throat or the back of the nose (postnasal drip).  · Decreased sense of smell and taste.  · Fatigue.  · A fever.  · Sore throat.  · Bad breath.  How is this diagnosed?  This condition is diagnosed based on:  · Your symptoms.  · Your medical history.  · A physical exam.  · Tests to find out if your condition is  acute or chronic. This may include:  ? Checking your nose for nasal polyps.  ? Viewing your sinuses using a device that has a light (endoscope).  ? Testing for allergies or bacteria.  ? Imaging tests, such as an MRI or CT scan.  In rare cases, a bone biopsy may be done to rule out more serious types of fungal sinus disease.  How is this treated?  Treatment for sinusitis depends on the cause and whether your condition is chronic or acute.  · If caused by a virus, your symptoms should go away on their own within 10 days. You may be given medicines to relieve symptoms. They include:  ? Medicines that shrink swollen nasal passages (topical intranasal decongestants).  ? Medicines that treat allergies (antihistamines).  ? A spray that eases inflammation of the nostrils (topical intranasal corticosteroids).  ? Rinses that help get rid of thick mucus in your nose (nasal saline washes).  · If caused by bacteria, your health care provider may recommend waiting to see if your symptoms improve. Most bacterial infections will get better without antibiotic medicine. You may be given antibiotics if you have:  ? A severe infection.  ? A weak immune system.  · If caused by narrow nasal passages or nasal polyps, you may need to have surgery.  Follow these instructions at home:  Medicines  · Take, use, or apply over-the-counter and prescription medicines only as told by your health care provider. These may include nasal sprays.  · If you were prescribed an antibiotic medicine, take it as told by your health care provider. Do not stop taking the antibiotic even if you start to feel better.  Hydrate and humidify    · Drink enough fluid to keep your urine pale yellow. Staying hydrated will help to thin your mucus.  · Use a cool mist humidifier to keep the humidity level in your home above 50%.  · Inhale steam for 10-15 minutes, 3-4 times a day, or as told by your health care provider. You can do this in the bathroom while a hot shower is  running.  · Limit your exposure to cool or dry air.  Rest  · Rest as much as possible.  · Sleep with your head raised (elevated).  · Make sure you get enough sleep each night.  General instructions    · Apply a warm, moist washcloth to your face 3-4 times a day or as told by your health care provider. This will help with discomfort.  · Wash your hands often with soap and water to reduce your exposure to germs. If soap and water are not available, use hand .  · Do not smoke. Avoid being around people who are smoking (secondhand smoke).  · Keep all follow-up visits as told by your health care provider. This is important.  Contact a health care provider if:  · You have a fever.  · Your symptoms get worse.  · Your symptoms do not improve within 10 days.  Get help right away if:  · You have a severe headache.  · You have persistent vomiting.  · You have severe pain or swelling around your face or eyes.  · You have vision problems.  · You develop confusion.  · Your neck is stiff.  · You have trouble breathing.  Summary  · Sinusitis is soreness and inflammation of your sinuses. Sinuses are hollow spaces in the bones around your face.  · This condition is caused by nasal tissues that become inflamed or swollen. The swelling traps or blocks the flow of mucus. This allows bacteria, viruses, and fungi to grow, which leads to infection.  · If you were prescribed an antibiotic medicine, take it as told by your health care provider. Do not stop taking the antibiotic even if you start to feel better.  · Keep all follow-up visits as told by your health care provider. This is important.  This information is not intended to replace advice given to you by your health care provider. Make sure you discuss any questions you have with your health care provider.  Document Released: 12/18/2006 Document Revised: 05/20/2019 Document Reviewed: 05/20/2019  Elsevier Patient Education © 2020 [employer]

## 2020-07-12 NOTE — PROGRESS NOTES
Subjective:      Jayashree Carrasco is a 50 y.o. female who presents with Sinusitis (last day of antibiotics and isnt getting better )     HPI:  Jayashree Carrasco is a 50 y.o. female who presents today for evaluation of sinusitis. Patient was initially seen on 7/5/2020 for evaluation of 6 days of right ear, sinus, and jaw pain.  She was found to have a recurrent maxillary sinusitis.  She also had a concomitant UTI at the time.  Due to the dual infection she was placed on a course of Keflex.  Patient reports that she will finish her course of Keflex today but still has the symptoms.  In fact, she reports that the symptoms on the right side of her face have been gradually worsening.  She still has right-sided sinus pain, headache, congestion, mild sore throat on the right side, and right-sided ear pain.  No fever/chills, chest pain, shortness of breath, or lightheadedness/dizziness.  She has been taking the Keflex, cetirizine daily, and occasionally using Flonase.      Review of Systems   Constitutional: Negative for chills, fever and malaise/fatigue.   HENT: Positive for congestion, ear pain, sinus pain and sore throat.    Eyes: Negative for blurred vision and photophobia.   Respiratory: Negative for cough and shortness of breath.    Cardiovascular: Negative for chest pain and palpitations.   Gastrointestinal: Negative for abdominal pain, diarrhea, nausea and vomiting.   Musculoskeletal: Negative for myalgias.   Skin: Negative for rash.   Neurological: Positive for headaches. Negative for dizziness.   Endo/Heme/Allergies: Positive for environmental allergies. Does not bruise/bleed easily.       PMH:  has a past medical history of ASTHMA, Cancer ([facility]), Chronic low back pain (4/12/2014), Chronic neck pain (4/12/2014), Fibromyalgia, GERD (gastroesophageal reflux disease), Thyroid disease, and Unspecified vitamin D deficiency (4/12/2014).  MEDS:   Current Outpatient Medications:   •  doxycycline (VIBRAMYCIN) 100 MG Tab, Take 1  Tab by mouth 2 times a day for 7 days., Disp: 14 Tab, Rfl: 0  •  fluconazole (DIFLUCAN) 150 MG tablet, Take 1 Tab by mouth Once for 1 dose., Disp: 1 Tab, Rfl: 0  •  promethazine (PHENERGAN) 25 MG Tab, Take 1 Tab by mouth every 6 hours as needed for Nausea/Vomiting., Disp: 30 Tab, Rfl: 0  •  levothyroxine (SYNTHROID) 137 MCG Tab, Take 1 Tab by mouth 2 Times a Day., Disp: 96 Tab, Rfl: 3  •  ibuprofen (MOTRIN) 600 MG Tab, Take 1 Tab by mouth every 6 hours as needed., Disp: 30 Tab, Rfl: 0  •  hyoscyamine-maalox plus-lidocaine viscous (GI COCKTAIL), Take 15-30 mL by mouth every 6 hours as needed. (Patient not taking: Reported on 7/5/2020), Disp: 500 mL, Rfl: 0  •  cetirizine (ZYRTEC) 10 MG Tab, Take 10 mg by mouth every day., Disp: , Rfl:   •  oxybutynin (DITROPAN) 5 MG Tab, Take 5 mg by mouth every day., Disp: , Rfl:   •  nortriptyline (PAMELOR) 10 MG Cap, Take 10 mg by mouth every bedtime., Disp: , Rfl: 0  •  lidocaine (XYLOCAINE) 5 % Ointment, Apply 1 Each to affected area(s) as needed. BACK, Disp: , Rfl: 2  •  ibuprofen (MOTRIN) 800 MG Tab, Take 800 mg by mouth every 8 hours as needed., Disp: , Rfl: 5  •  fluticasone (FLONASE) 50 MCG/ACT nasal spray, Spray 2 Sprays in nose every day., Disp: , Rfl: 5  •  tizanidine (ZANAFLEX) 6 MG capsule, Take 6 mg by mouth 2 Times a Day., Disp: , Rfl:   •  oxyCODONE-acetaminophen (PERCOCET) 7.5-325 MG per tablet, Take 1 Tab by mouth every 8 hours as needed., Disp: , Rfl:   •  albuterol (PROVENTIL) 2.5mg/3ml Nebu Soln solution for nebulization, 3 mL by Nebulization route every four hours as needed for Shortness of Breath., Disp: 75 mL, Rfl: 0  •  pantoprazole (PROTONIX) 40 MG Tablet Delayed Response, Take 1 Tab by mouth 2 times a day., Disp: 60 Tab, Rfl: 0  •  albuterol 108 (90 Base) MCG/ACT Aero Soln inhalation aerosol, Inhale 2 Puffs by mouth every 6 hours as needed for Shortness of Breath., Disp: 8.5 g, Rfl: 0  •  vitamin D, Ergocalciferol, (DRISDOL) 51163 units Cap capsule, Take  "50,000 Units by mouth every Saturday., Disp: , Rfl:   ALLERGIES:   Allergies   Allergen Reactions   • Propofol      Hypotension, and severe asthma attack   • Ciprofloxacin Rash     Rxn - about 2012   • Sulfa Drugs Rash     Rxn - about 2012     SURGHX:   Past Surgical History:   Procedure Laterality Date   • ABDOMINAL HYSTERECTOMY TOTAL     • CHOLECYSTECTOMY     • THYROIDECTOMY       SOCHX:  reports that she has never smoked. She has never used smokeless tobacco. She reports that she does not drink alcohol or use drugs.  FH: Family history was reviewed, no pertinent findings to report     Objective:     /72 (BP Location: Left arm, Patient Position: Sitting, BP Cuff Size: Adult long)   Pulse 97   Temp 36.9 °C (98.4 °F) (Temporal)   Resp 14   Ht 1.6 m (5' 3\")   Wt 90.3 kg (199 lb)   LMP 11/21/2015   SpO2 96%   BMI 35.25 kg/m²      Physical Exam  Constitutional:       Appearance: She is well-developed.   HENT:      Head: Normocephalic and atraumatic.      Right Ear: Tympanic membrane, ear canal and external ear normal.      Left Ear: Tympanic membrane, ear canal and external ear normal.      Nose: Mucosal edema and congestion present. No rhinorrhea.      Right Turbinates: Swollen.      Right Sinus: Maxillary sinus tenderness present. No frontal sinus tenderness.      Left Sinus: No maxillary sinus tenderness or frontal sinus tenderness.      Mouth/Throat:      Lips: Pink.      Mouth: Mucous membranes are moist.      Pharynx: Oropharynx is clear.   Eyes:      Conjunctiva/sclera: Conjunctivae normal.      Pupils: Pupils are equal, round, and reactive to light.   Neck:      Musculoskeletal: Normal range of motion.   Cardiovascular:      Rate and Rhythm: Normal rate and regular rhythm.      Heart sounds: Normal heart sounds. No murmur.   Pulmonary:      Effort: Pulmonary effort is normal.      Breath sounds: Normal breath sounds. No wheezing.   Lymphadenopathy:      Cervical: Cervical adenopathy present.      " Right cervical: Superficial cervical adenopathy present.   Skin:     General: Skin is warm and dry.      Capillary Refill: Capillary refill takes less than 2 seconds.   Neurological:      Mental Status: She is alert and oriented to person, place, and time.   Psychiatric:         Behavior: Behavior normal.         Judgment: Judgment normal.            Assessment/Plan:       1. Acute recurrent maxillary sinusitis  - doxycycline (VIBRAMYCIN) 100 MG Tab; Take 1 Tab by mouth 2 times a day for 7 days.  Dispense: 14 Tab; Refill: 0    2. Antibiotic-induced yeast infection  - fluconazole (DIFLUCAN) 150 MG tablet; Take 1 Tab by mouth Once for 1 dose.  Dispense: 1 Tab; Refill: 0      *Physical exam still consistent with sinusitis.  Symptoms have persisted for greater than 10 days.  Patient was originally placed on a course of Keflex which is not the most ideal medication for sinusitis.  We will switch her to a course of doxycycline at this time.  Patient has only been using the Flonase occasionally.  Recommended daily usage of the intranasal corticosteroid.  Also discussed supportive measures such as steam inhalation and use of a coolmist humidifier at night.      Differential Diagnosis, natural history, and supportive care discussed. Return to the Urgent Care or follow up with your PCP if symptoms fail to resolve, or for any new or worsening symptoms. Emergency room precautions discussed. Patient and/or family appears understanding of information.

## 2020-09-23 ENCOUNTER — APPOINTMENT (OUTPATIENT)
Dept: RADIOLOGY | Facility: MEDICAL CENTER | Age: 50
End: 2020-09-23
Attending: INTERNAL MEDICINE
Payer: MEDICAID

## 2020-09-23 ENCOUNTER — APPOINTMENT (OUTPATIENT)
Dept: RADIOLOGY | Facility: MEDICAL CENTER | Age: 50
End: 2020-09-23
Attending: EMERGENCY MEDICINE
Payer: MEDICAID

## 2020-09-23 ENCOUNTER — HOSPITAL ENCOUNTER (OUTPATIENT)
Facility: MEDICAL CENTER | Age: 50
End: 2020-09-23
Attending: EMERGENCY MEDICINE | Admitting: INTERNAL MEDICINE
Payer: MEDICAID

## 2020-09-23 VITALS
TEMPERATURE: 96.8 F | SYSTOLIC BLOOD PRESSURE: 118 MMHG | HEIGHT: 63 IN | RESPIRATION RATE: 18 BRPM | BODY MASS INDEX: 35.82 KG/M2 | HEART RATE: 76 BPM | OXYGEN SATURATION: 95 % | DIASTOLIC BLOOD PRESSURE: 65 MMHG | WEIGHT: 202.16 LBS

## 2020-09-23 DIAGNOSIS — R07.9 CHEST PAIN, UNSPECIFIED TYPE: ICD-10-CM

## 2020-09-23 PROBLEM — R07.89 OTHER CHEST PAIN: Status: ACTIVE | Noted: 2020-09-23

## 2020-09-23 PROBLEM — B34.9 VIRAL SYNDROME: Status: ACTIVE | Noted: 2020-09-23

## 2020-09-23 LAB
ANION GAP SERPL CALC-SCNC: 15 MMOL/L (ref 7–16)
APTT PPP: 28.9 SEC (ref 24.7–36)
BASOPHILS # BLD AUTO: 0.8 % (ref 0–1.8)
BASOPHILS # BLD: 0.08 K/UL (ref 0–0.12)
BUN SERPL-MCNC: 16 MG/DL (ref 8–22)
CALCIUM SERPL-MCNC: 9.4 MG/DL (ref 8.5–10.5)
CHLORIDE SERPL-SCNC: 103 MMOL/L (ref 96–112)
CO2 SERPL-SCNC: 22 MMOL/L (ref 20–33)
COVID ORDER STATUS COVID19: NORMAL
CREAT SERPL-MCNC: 0.59 MG/DL (ref 0.5–1.4)
CRP SERPL HS-MCNC: 0.08 MG/DL (ref 0–0.75)
D DIMER PPP IA.FEU-MCNC: <0.27 UG/ML (FEU) (ref 0–0.5)
EKG IMPRESSION: NORMAL
EOSINOPHIL # BLD AUTO: 0.41 K/UL (ref 0–0.51)
EOSINOPHIL NFR BLD: 4 % (ref 0–6.9)
ERYTHROCYTE [DISTWIDTH] IN BLOOD BY AUTOMATED COUNT: 42 FL (ref 35.9–50)
ERYTHROCYTE [SEDIMENTATION RATE] IN BLOOD BY WESTERGREN METHOD: 5 MM/HOUR (ref 0–30)
GLUCOSE SERPL-MCNC: 105 MG/DL (ref 65–99)
HCT VFR BLD AUTO: 44.8 % (ref 37–47)
HGB BLD-MCNC: 14.5 G/DL (ref 12–16)
IMM GRANULOCYTES # BLD AUTO: 0.04 K/UL (ref 0–0.11)
IMM GRANULOCYTES NFR BLD AUTO: 0.4 % (ref 0–0.9)
INR PPP: 1.12 (ref 0.87–1.13)
LYMPHOCYTES # BLD AUTO: 3.47 K/UL (ref 1–4.8)
LYMPHOCYTES NFR BLD: 34.1 % (ref 22–41)
MCH RBC QN AUTO: 29.5 PG (ref 27–33)
MCHC RBC AUTO-ENTMCNC: 32.4 G/DL (ref 33.6–35)
MCV RBC AUTO: 91.1 FL (ref 81.4–97.8)
MONOCYTES # BLD AUTO: 0.72 K/UL (ref 0–0.85)
MONOCYTES NFR BLD AUTO: 7.1 % (ref 0–13.4)
NEUTROPHILS # BLD AUTO: 5.47 K/UL (ref 2–7.15)
NEUTROPHILS NFR BLD: 53.6 % (ref 44–72)
NRBC # BLD AUTO: 0 K/UL
NRBC BLD-RTO: 0 /100 WBC
NT-PROBNP SERPL IA-MCNC: 34 PG/ML (ref 0–125)
PLATELET # BLD AUTO: 276 K/UL (ref 164–446)
PMV BLD AUTO: 10.2 FL (ref 9–12.9)
POTASSIUM SERPL-SCNC: 4.5 MMOL/L (ref 3.6–5.5)
PROTHROMBIN TIME: 14.8 SEC (ref 12–14.6)
RBC # BLD AUTO: 4.92 M/UL (ref 4.2–5.4)
SARS-COV-2 RNA RESP QL NAA+PROBE: NOTDETECTED
SODIUM SERPL-SCNC: 140 MMOL/L (ref 135–145)
SPECIMEN SOURCE: NORMAL
TROPONIN T SERPL-MCNC: <6 NG/L (ref 6–19)
TROPONIN T SERPL-MCNC: <6 NG/L (ref 6–19)
WBC # BLD AUTO: 10.2 K/UL (ref 4.8–10.8)

## 2020-09-23 PROCEDURE — 700111 HCHG RX REV CODE 636 W/ 250 OVERRIDE (IP): Performed by: INTERNAL MEDICINE

## 2020-09-23 PROCEDURE — 96376 TX/PRO/DX INJ SAME DRUG ADON: CPT

## 2020-09-23 PROCEDURE — 85730 THROMBOPLASTIN TIME PARTIAL: CPT

## 2020-09-23 PROCEDURE — 84484 ASSAY OF TROPONIN QUANT: CPT

## 2020-09-23 PROCEDURE — U0003 INFECTIOUS AGENT DETECTION BY NUCLEIC ACID (DNA OR RNA); SEVERE ACUTE RESPIRATORY SYNDROME CORONAVIRUS 2 (SARS-COV-2) (CORONAVIRUS DISEASE [COVID-19]), AMPLIFIED PROBE TECHNIQUE, MAKING USE OF HIGH THROUGHPUT TECHNOLOGIES AS DESCRIBED BY CMS-2020-01-R: HCPCS

## 2020-09-23 PROCEDURE — 93005 ELECTROCARDIOGRAM TRACING: CPT

## 2020-09-23 PROCEDURE — A9270 NON-COVERED ITEM OR SERVICE: HCPCS | Performed by: EMERGENCY MEDICINE

## 2020-09-23 PROCEDURE — 80048 BASIC METABOLIC PNL TOTAL CA: CPT

## 2020-09-23 PROCEDURE — 700102 HCHG RX REV CODE 250 W/ 637 OVERRIDE(OP): Performed by: EMERGENCY MEDICINE

## 2020-09-23 PROCEDURE — 700102 HCHG RX REV CODE 250 W/ 637 OVERRIDE(OP): Performed by: INTERNAL MEDICINE

## 2020-09-23 PROCEDURE — 700111 HCHG RX REV CODE 636 W/ 250 OVERRIDE (IP): Performed by: EMERGENCY MEDICINE

## 2020-09-23 PROCEDURE — A9502 TC99M TETROFOSMIN: HCPCS

## 2020-09-23 PROCEDURE — 96375 TX/PRO/DX INJ NEW DRUG ADDON: CPT

## 2020-09-23 PROCEDURE — 99236 HOSP IP/OBS SAME DATE HI 85: CPT | Performed by: INTERNAL MEDICINE

## 2020-09-23 PROCEDURE — G0378 HOSPITAL OBSERVATION PER HR: HCPCS

## 2020-09-23 PROCEDURE — 86140 C-REACTIVE PROTEIN: CPT

## 2020-09-23 PROCEDURE — 96374 THER/PROPH/DIAG INJ IV PUSH: CPT

## 2020-09-23 PROCEDURE — 85025 COMPLETE CBC W/AUTO DIFF WBC: CPT

## 2020-09-23 PROCEDURE — 99285 EMERGENCY DEPT VISIT HI MDM: CPT

## 2020-09-23 PROCEDURE — 36415 COLL VENOUS BLD VENIPUNCTURE: CPT

## 2020-09-23 PROCEDURE — 700111 HCHG RX REV CODE 636 W/ 250 OVERRIDE (IP)

## 2020-09-23 PROCEDURE — C9803 HOPD COVID-19 SPEC COLLECT: HCPCS | Performed by: EMERGENCY MEDICINE

## 2020-09-23 PROCEDURE — 71275 CT ANGIOGRAPHY CHEST: CPT

## 2020-09-23 PROCEDURE — 83880 ASSAY OF NATRIURETIC PEPTIDE: CPT

## 2020-09-23 PROCEDURE — 85379 FIBRIN DEGRADATION QUANT: CPT

## 2020-09-23 PROCEDURE — 93005 ELECTROCARDIOGRAM TRACING: CPT | Performed by: EMERGENCY MEDICINE

## 2020-09-23 PROCEDURE — A9270 NON-COVERED ITEM OR SERVICE: HCPCS | Performed by: INTERNAL MEDICINE

## 2020-09-23 PROCEDURE — 85610 PROTHROMBIN TIME: CPT

## 2020-09-23 PROCEDURE — 85652 RBC SED RATE AUTOMATED: CPT

## 2020-09-23 PROCEDURE — 700117 HCHG RX CONTRAST REV CODE 255: Performed by: EMERGENCY MEDICINE

## 2020-09-23 RX ORDER — FLUTICASONE PROPIONATE 50 MCG
2 SPRAY, SUSPENSION (ML) NASAL DAILY
Status: DISCONTINUED | OUTPATIENT
Start: 2020-09-23 | End: 2020-09-23 | Stop reason: HOSPADM

## 2020-09-23 RX ORDER — OXYCODONE AND ACETAMINOPHEN 7.5; 325 MG/1; MG/1
1 TABLET ORAL EVERY 8 HOURS PRN
Status: SHIPPED | COMMUNITY
End: 2022-02-03

## 2020-09-23 RX ORDER — REGADENOSON 0.08 MG/ML
INJECTION, SOLUTION INTRAVENOUS
Status: COMPLETED
Start: 2020-09-23 | End: 2020-09-23

## 2020-09-23 RX ORDER — POLYETHYLENE GLYCOL 3350 17 G/17G
1 POWDER, FOR SOLUTION ORAL
Status: DISCONTINUED | OUTPATIENT
Start: 2020-09-23 | End: 2020-09-23 | Stop reason: HOSPADM

## 2020-09-23 RX ORDER — ASPIRIN 325 MG
325 TABLET ORAL DAILY
Status: DISCONTINUED | OUTPATIENT
Start: 2020-09-23 | End: 2020-09-23 | Stop reason: HOSPADM

## 2020-09-23 RX ORDER — NORTRIPTYLINE HYDROCHLORIDE 10 MG/1
10 CAPSULE ORAL
Status: DISCONTINUED | OUTPATIENT
Start: 2020-09-23 | End: 2020-09-23 | Stop reason: HOSPADM

## 2020-09-23 RX ORDER — ASPIRIN 300 MG/1
300 SUPPOSITORY RECTAL DAILY
Status: DISCONTINUED | OUTPATIENT
Start: 2020-09-23 | End: 2020-09-23 | Stop reason: HOSPADM

## 2020-09-23 RX ORDER — PROCHLORPERAZINE EDISYLATE 5 MG/ML
5-10 INJECTION INTRAMUSCULAR; INTRAVENOUS EVERY 4 HOURS PRN
Status: DISCONTINUED | OUTPATIENT
Start: 2020-09-23 | End: 2020-09-23 | Stop reason: HOSPADM

## 2020-09-23 RX ORDER — ASPIRIN 81 MG/1
324 TABLET, CHEWABLE ORAL ONCE
Status: COMPLETED | OUTPATIENT
Start: 2020-09-23 | End: 2020-09-23

## 2020-09-23 RX ORDER — FLUTICASONE PROPIONATE 50 MCG
2 SPRAY, SUSPENSION (ML) NASAL 2 TIMES DAILY PRN
COMMUNITY
End: 2021-07-02

## 2020-09-23 RX ORDER — OXYBUTYNIN CHLORIDE 5 MG/1
5 TABLET ORAL DAILY
Status: DISCONTINUED | OUTPATIENT
Start: 2020-09-23 | End: 2020-09-23 | Stop reason: HOSPADM

## 2020-09-23 RX ORDER — OXYCODONE HYDROCHLORIDE AND ACETAMINOPHEN 5; 325 MG/1; MG/1
1 TABLET ORAL EVERY 8 HOURS PRN
Status: DISCONTINUED | OUTPATIENT
Start: 2020-09-23 | End: 2020-09-23 | Stop reason: HOSPADM

## 2020-09-23 RX ORDER — PROMETHAZINE HYDROCHLORIDE 25 MG/1
12.5-25 SUPPOSITORY RECTAL EVERY 4 HOURS PRN
Status: DISCONTINUED | OUTPATIENT
Start: 2020-09-23 | End: 2020-09-23 | Stop reason: HOSPADM

## 2020-09-23 RX ORDER — CETIRIZINE HYDROCHLORIDE 10 MG/1
10 TABLET ORAL DAILY
Status: DISCONTINUED | OUTPATIENT
Start: 2020-09-23 | End: 2020-09-23

## 2020-09-23 RX ORDER — ONDANSETRON 2 MG/ML
4 INJECTION INTRAMUSCULAR; INTRAVENOUS EVERY 4 HOURS PRN
Status: DISCONTINUED | OUTPATIENT
Start: 2020-09-23 | End: 2020-09-23 | Stop reason: HOSPADM

## 2020-09-23 RX ORDER — BUTALBITAL, ACETAMINOPHEN AND CAFFEINE 50; 325; 40 MG/1; MG/1; MG/1
1 TABLET ORAL EVERY 6 HOURS PRN
Status: DISCONTINUED | OUTPATIENT
Start: 2020-09-23 | End: 2020-09-23 | Stop reason: HOSPADM

## 2020-09-23 RX ORDER — AMOXICILLIN 250 MG
2 CAPSULE ORAL 2 TIMES DAILY
Status: DISCONTINUED | OUTPATIENT
Start: 2020-09-23 | End: 2020-09-23 | Stop reason: HOSPADM

## 2020-09-23 RX ORDER — LEVOTHYROXINE SODIUM 137 UG/1
137 TABLET ORAL 2 TIMES DAILY
Status: DISCONTINUED | OUTPATIENT
Start: 2020-09-23 | End: 2020-09-23 | Stop reason: HOSPADM

## 2020-09-23 RX ORDER — ONDANSETRON 4 MG/1
4 TABLET, ORALLY DISINTEGRATING ORAL EVERY 4 HOURS PRN
Status: DISCONTINUED | OUTPATIENT
Start: 2020-09-23 | End: 2020-09-23 | Stop reason: HOSPADM

## 2020-09-23 RX ORDER — REGADENOSON 0.08 MG/ML
0.4 INJECTION, SOLUTION INTRAVENOUS
Status: COMPLETED | OUTPATIENT
Start: 2020-09-23 | End: 2020-09-23

## 2020-09-23 RX ORDER — ALBUTEROL SULFATE 90 UG/1
2 AEROSOL, METERED RESPIRATORY (INHALATION) EVERY 6 HOURS PRN
Status: DISCONTINUED | OUTPATIENT
Start: 2020-09-23 | End: 2020-09-23 | Stop reason: HOSPADM

## 2020-09-23 RX ORDER — PROMETHAZINE HYDROCHLORIDE 25 MG/1
12.5-25 TABLET ORAL EVERY 4 HOURS PRN
Status: DISCONTINUED | OUTPATIENT
Start: 2020-09-23 | End: 2020-09-23 | Stop reason: HOSPADM

## 2020-09-23 RX ORDER — OMEPRAZOLE 20 MG/1
20 CAPSULE, DELAYED RELEASE ORAL 2 TIMES DAILY
Status: DISCONTINUED | OUTPATIENT
Start: 2020-09-23 | End: 2020-09-23 | Stop reason: HOSPADM

## 2020-09-23 RX ORDER — NITROFURANTOIN 25; 75 MG/1; MG/1
100 CAPSULE ORAL DAILY
COMMUNITY
End: 2020-11-22

## 2020-09-23 RX ORDER — BISACODYL 10 MG
10 SUPPOSITORY, RECTAL RECTAL
Status: DISCONTINUED | OUTPATIENT
Start: 2020-09-23 | End: 2020-09-23 | Stop reason: HOSPADM

## 2020-09-23 RX ORDER — ONDANSETRON 2 MG/ML
4 INJECTION INTRAMUSCULAR; INTRAVENOUS ONCE
Status: COMPLETED | OUTPATIENT
Start: 2020-09-23 | End: 2020-09-23

## 2020-09-23 RX ORDER — ASPIRIN 81 MG/1
324 TABLET, CHEWABLE ORAL DAILY
Status: DISCONTINUED | OUTPATIENT
Start: 2020-09-23 | End: 2020-09-23 | Stop reason: HOSPADM

## 2020-09-23 RX ORDER — ALBUTEROL SULFATE 90 UG/1
2 AEROSOL, METERED RESPIRATORY (INHALATION) EVERY 4 HOURS PRN
Status: SHIPPED | COMMUNITY
End: 2022-04-11 | Stop reason: SDUPTHER

## 2020-09-23 RX ORDER — AMINOPHYLLINE 25 MG/ML
100 INJECTION, SOLUTION INTRAVENOUS
Status: DISCONTINUED | OUTPATIENT
Start: 2020-09-23 | End: 2020-09-23 | Stop reason: HOSPADM

## 2020-09-23 RX ORDER — TIZANIDINE 4 MG/1
6 TABLET ORAL 2 TIMES DAILY PRN
Status: DISCONTINUED | OUTPATIENT
Start: 2020-09-23 | End: 2020-09-23 | Stop reason: HOSPADM

## 2020-09-23 RX ORDER — MORPHINE SULFATE 4 MG/ML
4 INJECTION, SOLUTION INTRAMUSCULAR; INTRAVENOUS ONCE
Status: COMPLETED | OUTPATIENT
Start: 2020-09-23 | End: 2020-09-23

## 2020-09-23 RX ADMIN — OXYCODONE HYDROCHLORIDE AND ACETAMINOPHEN 1 TABLET: 5; 325 TABLET ORAL at 11:53

## 2020-09-23 RX ADMIN — OXYBUTYNIN CHLORIDE 5 MG: 5 TABLET ORAL at 13:37

## 2020-09-23 RX ADMIN — MORPHINE SULFATE 4 MG: 4 INJECTION INTRAVENOUS at 04:54

## 2020-09-23 RX ADMIN — ONDANSETRON 4 MG: 2 INJECTION INTRAMUSCULAR; INTRAVENOUS at 07:53

## 2020-09-23 RX ADMIN — LEVOTHYROXINE SODIUM 137 MCG: 137 TABLET ORAL at 13:36

## 2020-09-23 RX ADMIN — REGADENOSON 0.4 MG: 0.08 INJECTION, SOLUTION INTRAVENOUS at 09:56

## 2020-09-23 RX ADMIN — ASPIRIN 324 MG: 81 TABLET, CHEWABLE ORAL at 03:40

## 2020-09-23 RX ADMIN — IOHEXOL 40 ML: 350 INJECTION, SOLUTION INTRAVENOUS at 04:00

## 2020-09-23 RX ADMIN — OMEPRAZOLE 20 MG: 20 CAPSULE, DELAYED RELEASE ORAL at 11:53

## 2020-09-23 RX ADMIN — FLUTICASONE PROPIONATE 100 MCG: 50 SPRAY, METERED NASAL at 13:37

## 2020-09-23 RX ADMIN — NITROGLYCERIN 1 INCH: 20 OINTMENT TOPICAL at 03:40

## 2020-09-23 RX ADMIN — FAMOTIDINE 20 MG: 10 INJECTION, SOLUTION INTRAVENOUS at 13:31

## 2020-09-23 RX ADMIN — LIDOCAINE HYDROCHLORIDE 30 ML: 20 SOLUTION OROPHARYNGEAL at 13:32

## 2020-09-23 RX ADMIN — ONDANSETRON 4 MG: 2 INJECTION INTRAMUSCULAR; INTRAVENOUS at 03:40

## 2020-09-23 RX ADMIN — NITROGLYCERIN 0.5 INCH: 20 OINTMENT TOPICAL at 07:57

## 2020-09-23 ASSESSMENT — LIFESTYLE VARIABLES
ON A TYPICAL DAY WHEN YOU DRINK ALCOHOL HOW MANY DRINKS DO YOU HAVE: 0
AVERAGE NUMBER OF DAYS PER WEEK YOU HAVE A DRINK CONTAINING ALCOHOL: 0
HOW MANY TIMES IN THE PAST YEAR HAVE YOU HAD 5 OR MORE DRINKS IN A DAY: 0
HAVE YOU EVER FELT YOU SHOULD CUT DOWN ON YOUR DRINKING: NO
TOTAL SCORE: 0
DO YOU DRINK ALCOHOL: NO
EVER FELT BAD OR GUILTY ABOUT YOUR DRINKING: NO
EVER HAD A DRINK FIRST THING IN THE MORNING TO STEADY YOUR NERVES TO GET RID OF A HANGOVER: NO
TOTAL SCORE: 0
ALCOHOL_USE: NO
HAVE PEOPLE ANNOYED YOU BY CRITICIZING YOUR DRINKING: NO
TOTAL SCORE: 0
CONSUMPTION TOTAL: NEGATIVE

## 2020-09-23 ASSESSMENT — FIBROSIS 4 INDEX
FIB4 SCORE: 1.62
FIB4 SCORE: 1.33

## 2020-09-23 ASSESSMENT — ENCOUNTER SYMPTOMS
WEAKNESS: 1
NAUSEA: 1
COUGH: 1
ABDOMINAL PAIN: 0
CHILLS: 0
SHORTNESS OF BREATH: 0
VOMITING: 0
FEVER: 0
DEPRESSION: 0
DIAPHORESIS: 1
MYALGIAS: 1

## 2020-09-23 ASSESSMENT — PATIENT HEALTH QUESTIONNAIRE - PHQ9
2. FEELING DOWN, DEPRESSED, IRRITABLE, OR HOPELESS: NOT AT ALL
SUM OF ALL RESPONSES TO PHQ9 QUESTIONS 1 AND 2: 0
1. LITTLE INTEREST OR PLEASURE IN DOING THINGS: NOT AT ALL

## 2020-09-23 NOTE — H&P
Hospital Medicine History & Physical Note    Date of Service  9/23/2020    Primary Care Physician  Shelby Bell M.D.    Consultants  none    Code Status  Full Code    Chief Complaint  Chief Complaint   Patient presents with   • Flu Like Symptoms   • Chest Pain       History of Presenting Illness  50 y.o. female who presented 9/23/2020 with history of asthma as well as GERD and hypothyroidism comes in with above chief complaint.  For the last 1 week she has been having hot flashes at night with associated myalgias but no fevers as her developing severe chest pain over the last couple days in the midsternal region that radiates to between her shoulder blades and down both arms and described as sharp and stabbing and not associated with position not associate with deep breathing.  She is never had pain like this before and was taking a large amount of Rolaids at home with no reduce in symptoms.  She also felt that it was severe acid reflux but prior medications and not help.  She denies lifting any heavy equipment and denies any direct contact with COVID positive patients however though apparently her son's friend was exposed to COVID.  She denies any obvious skin changes.    In the emergency room and a rapid COVID swab was done and results are pending and she is given Nitropaste which reduced her chest pain and was removed because of headache and her chest pain came back and therefore was replaced.    Review of Systems  Review of Systems   Constitutional: Positive for diaphoresis and malaise/fatigue. Negative for chills and fever.   Respiratory: Positive for cough. Negative for shortness of breath.    Cardiovascular: Positive for chest pain. Negative for leg swelling.   Gastrointestinal: Positive for nausea. Negative for abdominal pain and vomiting.   Genitourinary: Negative for dysuria and urgency.   Musculoskeletal: Positive for myalgias. Negative for joint pain.   Neurological: Positive for weakness.    Psychiatric/Behavioral: Negative for depression.   All other systems reviewed and are negative.      Past Medical History   has a past medical history of ASTHMA, Cancer (HCC), Chronic low back pain (4/12/2014), Chronic neck pain (4/12/2014), Fibromyalgia, GERD (gastroesophageal reflux disease), Thyroid disease, and Unspecified vitamin D deficiency (4/12/2014).    Surgical History   has a past surgical history that includes cholecystectomy; thyroidectomy; and abdominal hysterectomy total.     Family History  family history includes Cancer in her mother; Cancer (age of onset: 51) in her cousin; Cancer (age of onset: 72) in her maternal aunt; Diabetes in her maternal grandfather; Stroke (age of onset: 40) in her mother.     Social History   reports that she has never smoked. She has never used smokeless tobacco. She reports that she does not drink alcohol or use drugs.    Allergies  Allergies   Allergen Reactions   • Propofol      Hypotension, and severe asthma attack   • Ciprofloxacin Rash     Rxn - about 2012   • Sulfa Drugs Rash     Rxn - about 2012       Medications  Prior to Admission Medications   Prescriptions Last Dose Informant Patient Reported? Taking?   albuterol (PROVENTIL) 2.5mg/3ml Nebu Soln solution for nebulization  Patient No No   Sig: 3 mL by Nebulization route every four hours as needed for Shortness of Breath.   albuterol 108 (90 Base) MCG/ACT Aero Soln inhalation aerosol  Patient No No   Sig: Inhale 2 Puffs by mouth every 6 hours as needed for Shortness of Breath.   cetirizine (ZYRTEC) 10 MG Tab  Patient Yes No   Sig: Take 10 mg by mouth every day.   fluticasone (FLONASE) 50 MCG/ACT nasal spray  Patient Yes No   Sig: Spray 2 Sprays in nose every day.   hyoscyamine-maalox plus-lidocaine viscous (GI COCKTAIL)   No No   Sig: Take 15-30 mL by mouth every 6 hours as needed.   Patient not taking: Reported on 7/5/2020   ibuprofen (MOTRIN) 600 MG Tab   No No   Sig: Take 1 Tab by mouth every 6 hours as  needed.   ibuprofen (MOTRIN) 800 MG Tab  Patient Yes No   Sig: Take 800 mg by mouth every 8 hours as needed.   levothyroxine (SYNTHROID) 137 MCG Tab   No No   Sig: Take 1 Tab by mouth 2 Times a Day.   lidocaine (XYLOCAINE) 5 % Ointment  Patient Yes No   Sig: Apply 1 Each to affected area(s) as needed. BACK   nortriptyline (PAMELOR) 10 MG Cap  Patient Yes No   Sig: Take 10 mg by mouth every bedtime.   oxyCODONE-acetaminophen (PERCOCET) 7.5-325 MG per tablet  Patient Yes No   Sig: Take 1 Tab by mouth every 8 hours as needed.   oxybutynin (DITROPAN) 5 MG Tab  Patient Yes No   Sig: Take 5 mg by mouth every day.   pantoprazole (PROTONIX) 40 MG Tablet Delayed Response  Patient No No   Sig: Take 1 Tab by mouth 2 times a day.   promethazine (PHENERGAN) 25 MG Tab   No No   Sig: Take 1 Tab by mouth every 6 hours as needed for Nausea/Vomiting.   tizanidine (ZANAFLEX) 6 MG capsule  Patient Yes No   Sig: Take 6 mg by mouth 2 Times a Day.   vitamin D, Ergocalciferol, (DRISDOL) 53940 units Cap capsule  Patient Yes No   Sig: Take 50,000 Units by mouth every Saturday.      Facility-Administered Medications: None       Physical Exam  Temp:  [36.1 °C (97 °F)] 36.1 °C (97 °F)  Pulse:  [65-96] 72  Resp:  [17-20] 17  BP: (104-160)/(59-85) 129/66  SpO2:  [96 %-100 %] 99 %    Physical Exam  Vitals signs and nursing note reviewed.   Constitutional:       General: She is not in acute distress.     Appearance: She is well-developed.   HENT:      Head: Normocephalic and atraumatic.      Mouth/Throat:      Pharynx: No oropharyngeal exudate.   Eyes:      General: No scleral icterus.     Pupils: Pupils are equal, round, and reactive to light.   Neck:      Musculoskeletal: Normal range of motion and neck supple.      Thyroid: No thyromegaly.   Cardiovascular:      Rate and Rhythm: Normal rate and regular rhythm.      Heart sounds: Normal heart sounds. No murmur.   Pulmonary:      Effort: Pulmonary effort is normal. No respiratory distress.       Breath sounds: Normal breath sounds. No wheezing.   Chest:      Chest wall: Tenderness (moderate over the lower mid sternal boder) present.   Abdominal:      General: Bowel sounds are normal. There is no distension.      Palpations: Abdomen is soft.      Tenderness: There is no abdominal tenderness.   Musculoskeletal: Normal range of motion.         General: No tenderness.   Skin:     General: Skin is warm and dry.      Findings: No rash.   Neurological:      Mental Status: She is alert and oriented to person, place, and time.      Cranial Nerves: No cranial nerve deficit.         Laboratory:  Recent Labs     09/23/20 0219   WBC 10.2   RBC 4.92   HEMOGLOBIN 14.5   HEMATOCRIT 44.8   MCV 91.1   MCH 29.5   MCHC 32.4*   RDW 42.0   PLATELETCT 276   MPV 10.2     Recent Labs     09/23/20 0219   SODIUM 140   POTASSIUM 4.5   CHLORIDE 103   CO2 22   GLUCOSE 105*   BUN 16   CREATININE 0.59   CALCIUM 9.4     Recent Labs     09/23/20 0219   GLUCOSE 105*     Recent Labs     09/23/20 0219   APTT 28.9   INR 1.12     Recent Labs     09/23/20 0219   NTPROBNP 34         Recent Labs     09/23/20 0219   TROPONINT <6       Imaging:  CT-CTA CHEST PULMONARY ARTERY W/ RECONS   Final Result         No evidence of pulmonary embolism.      Lungs are generally clear clear. No pneumonia.               NM-CARDIAC STRESS TEST    (Results Pending)         Assessment/Plan:  I anticipate this patient is appropriate for observation status at this time.    * Other chest pain- (present on admission)  Assessment & Plan  -highly c/f angina, though 12-lead EKG personally reviewed by me shows no e/o acute ST segment changes and initial trops is negative  -CTA chest negative  -D dimer to to R/O AD  -nitropaste  -ASA  -check glycoHB and lipid panel  -NM cardiac stress test  -ESR/CRP  -admit to tele  -related to viral syndrome?    GERD (gastroesophageal reflux disease)- (present on admission)  Assessment & Plan  -outpatient PPI BID  -monitor    Viral  syndrome- (present on admission)  Assessment & Plan  -COVID test results pending  -monitor, supportive care    Obesity (BMI 35.0-39.9 without comorbidity)- (present on admission)  Assessment & Plan  -encourage weight loss    Mild intermittent asthma without complication- (present on admission)  Assessment & Plan  -no clear exacerbation, outpatient albuterol PRN    Hypothyroidism- (present on admission)  Assessment & Plan  -replacement with synthroid

## 2020-09-23 NOTE — ED NOTES
Med Rec complete per phone interview with Pt  Allergies Reviewed    Pt takes MACROBID, no known stop date

## 2020-09-23 NOTE — CARE PLAN
Problem: Communication  Goal: The ability to communicate needs accurately and effectively will improve  Outcome: MET     Problem: Safety  Goal: Will remain free from injury  Outcome: MET     Problem: Medication  Goal: Compliance with prescribed medication will improve  Outcome: MET

## 2020-09-23 NOTE — ED NOTES
COVID swab obtained and sent, pt stated she was getting a headache from the nitropaste, nitropaste removed, ERP aware.

## 2020-09-23 NOTE — ASSESSMENT & PLAN NOTE
-highly c/f angina, though 12-lead EKG personally reviewed by me shows no e/o acute ST segment changes and initial trops is negative  -CTA chest negative  -D dimer to to R/O AD  -nitropaste  -ASA  -check glycoHB and lipid panel  -NM cardiac stress test  -ESR/CRP  -admit to tele  -related to viral syndrome?

## 2020-09-23 NOTE — ED NOTES
Report received from Dax WHEATLEY pt care assumed pt awaiting final results for admit room assignment.

## 2020-09-23 NOTE — DISCHARGE INSTRUCTIONS
Discharge Instructions    Discharged to home by car with relative. Discharged via walking, hospital escort: Yes.  Special equipment needed: Not Applicable    Be sure to schedule a follow-up appointment with your primary care doctor or any specialists as instructed.     Discharge Plan:   Diet Plan: Discussed  Activity Level: Discussed  Confirmed Follow up Appointment: Patient to Call and Schedule Appointment  Confirmed Symptoms Management: Discussed  Medication Reconciliation Updated: Yes  Influenza Vaccine Indication: Patient Refuses    I understand that a diet low in cholesterol, fat, and sodium is recommended for good health. Unless I have been given specific instructions below for another diet, I accept this instruction as my diet prescription.   Other diet: Regular Diet    Special Instructions: None    · Is patient discharged on Warfarin / Coumadin?   No     Depression / Suicide Risk    As [facility] are [facility], it is important to learn how to keep safe from harming yourself.    Recognize the warning signs:  · Abrupt changes in personality, positive or negative- including increase in energy   · Giving away possessions  · Change in eating patterns- significant weight changes-  positive or negative  · Change in sleeping patterns- unable to sleep or sleeping all the time   · Unwillingness or inability to communicate  · Depression  · Unusual sadness, discouragement and loneliness  · Talk of wanting to die  · Neglect of personal appearance   · Rebelliousness- reckless behavior  · Withdrawal from people/activities they love  · Confusion- inability to concentrate     If you or a loved one observes any of these behaviors or has concerns about self-harm, here's what you can do:  · Talk about it- your feelings and reasons for harming yourself  · Remove any means that you might use to hurt yourself (examples: pills, rope, extension cords, firearm)  · Get professional help from the community  (Mental Health, Substance Abuse, psychological counseling)  · Do not be alone:Call your Safe Contact- someone whom you trust who will be there for you.  · Call your local CRISIS HOTLINE 072-0851 or 351-335-2708  · Call your local Children's Mobile Crisis Response Team [place] (741) 720-9257 or www.[employer]  · Call the toll free National Suicide Prevention Hotlines   · National Suicide Prevention Lifeline 546-539-XPKO (4147)  · National Hope Line Network 800-SUICIDE (133-5469)

## 2020-09-23 NOTE — PROCEDURES
Patient presents to NM suite for cardiac stress test with MPI. Nursing goals identified: knowledge deficit, potential for anxiety r/t stress test, potential for compromised cardiac output. Care plan includes educating patient, reassurance, access to Aminophylline and access to ACLS cart/team. Labs and ECG reviewed. No caffeine and NPO confirmed. Resting images attained and patient prepped for pharmacological stress study. Lexiscan given while patient ambulated on TM x 2 min. Reported &/or observed symptoms include: sob, nausea, headache. Caffeinated beverage provided. Symptoms resolved.

## 2020-09-23 NOTE — ED NOTES
Pt to admit room August FLORES with nuc med reports ready for pt 0845, Kavitha receiving RN aware.

## 2020-09-23 NOTE — ED TRIAGE NOTES
"Chief Complaint   Patient presents with   • Flu Like Symptoms   • Chest Pain     BIB EMS to [facility], pt on monitor and in gown, labs drawn and sent. Pt consists of multiple symptoms including dry cough, hot flashes, body aches and N/V. Also reports 6/10 midsternal sharp chest pain. AOx4, VSS.    Medications given en route: 4 mg Zofran    /85   Pulse 70   Temp 36.1 °C (97 °F) (Oral)   Resp 17   Ht 1.6 m (5' 3\")   Wt 92 kg (202 lb 13.2 oz)   LMP 11/21/2015   SpO2 99%   BMI 35.93 kg/m²   "

## 2020-09-23 NOTE — ED PROVIDER NOTES
"ED Provider Note        Primary care provider: Shelby Bell M.D.    I verified that the patient was wearing a mask and I was wearing appropriate PPE every time I entered the room. The patient's mask was on the patient at all times during my encounter except for a brief view of the oropharynx.      CHIEF COMPLAINT  Chief Complaint   Patient presents with   • Flu Like Symptoms   • Chest Pain       HPI  Jayashree Carrasco is a 50 y.o. female who presents to the Emergency Department with chief complaint of chest pain.  Patient reports over the last several days she has had an aching pain in her substernal area.  She reports that she has had occasional radiation into the right side of her neck and today she has pain in her right upper extremity.  She states that the pain is a heavy sensation it does get worse when she lays flat she reports minimal worsening with exertion.  She has had occasional shortness of breath as well.  Patient also reports that throughout the last week she has had occasional episodes with nausea and with profuse unprovoked sweating.  Patient significant other is a  and she accompanies him in all trips and has been on several \"long hauls\" recently.  She has multiple family members with early cardiac disease/early vascular disease and she also has been several family members with history of pulmonary embolism.  Patient denies any lower extremity pain or swelling.  Patient is also somewhat concerned because her son was around somebody with COVID yesterday and then she was around her son.  She has no cough no myalgias no sore throat no change in her sense of taste or smell no diarrhea no other acute symptoms or concerns at this time.    REVIEW OF SYSTEMS  10 systems reviewed and otherwise negative, pertinent positives and negatives listed in the history of present illness.    PAST MEDICAL HISTORY   has a past medical history of ASTHMA, Cancer (HCC), Chronic low back pain (4/12/2014), Chronic " "neck pain (4/12/2014), Fibromyalgia, GERD (gastroesophageal reflux disease), Thyroid disease, and Unspecified vitamin D deficiency (4/12/2014).    SURGICAL HISTORY   has a past surgical history that includes cholecystectomy; thyroidectomy; and abdominal hysterectomy total.    SOCIAL HISTORY  Social History     Tobacco Use   • Smoking status: Never Smoker   • Smokeless tobacco: Never Used   Substance Use Topics   • Alcohol use: No     Alcohol/week: 0.0 oz   • Drug use: No      Social History     Substance and Sexual Activity   Drug Use No       FAMILY HISTORY  Non-Contributory    CURRENT MEDICATIONS  Home Medications    **Home medications have not yet been reviewed for this encounter**         ALLERGIES  Allergies   Allergen Reactions   • Propofol      Hypotension, and severe asthma attack   • Ciprofloxacin Rash     Rxn - about 2012   • Sulfa Drugs Rash     Rxn - about 2012       PHYSICAL EXAM  VITAL SIGNS: /85   Pulse 70   Temp 36.1 °C (97 °F) (Oral)   Resp 19   Ht 1.6 m (5' 3\")   Wt 92 kg (202 lb 13.2 oz)   LMP 11/21/2015   SpO2 97%   BMI 35.93 kg/m²   Pulse ox interpretation: I interpret this pulse ox as normal.  Constitutional: Alert and oriented x 3, minimal distress  HEENT: Atraumatic normocephalic, pupils are equal round reactive to light extraocular movements are intact. The nares is clear, external ears are normal, mouth shows moist mucous membranes  Neck: Supple, no JVD no tracheal deviation  Cardiovascular: Regular rate and rhythm no murmur rub or gallop 2+ pulses peripherally x4  Thorax & Lungs: No respiratory distress, no wheezes rales or rhonchi, No chest tenderness.   GI: Soft nontender nondistended positive bowel sounds, no peritoneal signs  : Deferred  Rectal: Deferred  Skin: Warm dry no acute rash or lesion  Musculoskeletal: Moving all extremities with full range and 5 of 5 strength, no acute  deformity  Neurologic: Cranial nerves III through XII are grossly intact, no sensory " deficit, no cerebellar dysfunction   Psychiatric: Appropriate affect for situation at this time      DIAGNOSTIC STUDIES / PROCEDURES  LABS      Results for orders placed or performed during the hospital encounter of 09/23/20   CBC w/ Differential   Result Value Ref Range    WBC 10.2 4.8 - 10.8 K/uL    RBC 4.92 4.20 - 5.40 M/uL    Hemoglobin 14.5 12.0 - 16.0 g/dL    Hematocrit 44.8 37.0 - 47.0 %    MCV 91.1 81.4 - 97.8 fL    MCH 29.5 27.0 - 33.0 pg    MCHC 32.4 (L) 33.6 - 35.0 g/dL    RDW 42.0 35.9 - 50.0 fL    Platelet Count 276 164 - 446 K/uL    MPV 10.2 9.0 - 12.9 fL    Neutrophils-Polys 53.60 44.00 - 72.00 %    Lymphocytes 34.10 22.00 - 41.00 %    Monocytes 7.10 0.00 - 13.40 %    Eosinophils 4.00 0.00 - 6.90 %    Basophils 0.80 0.00 - 1.80 %    Immature Granulocytes 0.40 0.00 - 0.90 %    Nucleated RBC 0.00 /100 WBC    Neutrophils (Absolute) 5.47 2.00 - 7.15 K/uL    Lymphs (Absolute) 3.47 1.00 - 4.80 K/uL    Monos (Absolute) 0.72 0.00 - 0.85 K/uL    Eos (Absolute) 0.41 0.00 - 0.51 K/uL    Baso (Absolute) 0.08 0.00 - 0.12 K/uL    Immature Granulocytes (abs) 0.04 0.00 - 0.11 K/uL    NRBC (Absolute) 0.00 K/uL   Basic Metabolic Panel (BMP)   Result Value Ref Range    Sodium 140 135 - 145 mmol/L    Potassium 4.5 3.6 - 5.5 mmol/L    Chloride 103 96 - 112 mmol/L    Co2 22 20 - 33 mmol/L    Glucose 105 (H) 65 - 99 mg/dL    Bun 16 8 - 22 mg/dL    Creatinine 0.59 0.50 - 1.40 mg/dL    Calcium 9.4 8.5 - 10.5 mg/dL    Anion Gap 15.0 7.0 - 16.0   proBrain Natriuretic Peptide, NT   Result Value Ref Range    NT-proBNP 34 0 - 125 pg/mL   Troponin STAT   Result Value Ref Range    Troponin T <6 6 - 19 ng/L   APTT   Result Value Ref Range    APTT 28.9 24.7 - 36.0 sec   PT/INR   Result Value Ref Range    PT 14.8 (H) 12.0 - 14.6 sec    INR 1.12 0.87 - 1.13   ESTIMATED GFR   Result Value Ref Range    GFR If African American >60 >60 mL/min/1.73 m 2    GFR If Non African American >60 >60 mL/min/1.73 m 2   COVID/SARS CoV-2 PCR    Specimen:  Nasopharyngeal; Respirate   Result Value Ref Range    COVID Order Status Received    Troponin - STAT Once   Result Value Ref Range    Troponin T <6 6 - 19 ng/L   Sed Rate   Result Value Ref Range    Sed Rate Westergren 5 0 - 30 mm/hour   CRP QUANTITIVE (NON-CARDIAC)   Result Value Ref Range    Stat C-Reactive Protein 0.08 0.00 - 0.75 mg/dL   D-DIMER   Result Value Ref Range    D-Dimer Screen <0.27 0.00 - 0.50 ug/mL (FEU)   SARS-CoV-2, PCR (In-House)   Result Value Ref Range    SARS-CoV-2 Source NP Swab     SARS-CoV-2 by PCR NotDetected    EKG (NOW)   Result Value Ref Range    Report       [facility] Emergency Dept.    Test Date:  2020  Pt Name:    CORAL ZAMAN                 Department: ER  MRN:        [id number]                      Room:       [facility]  Gender:     Female                       Technician: [id number]  :        [id number]                   Requested By:ER TRIAGE PROTOCOL  Order #:    [id number]                    Reading MD: DAYANA OCHOA MD    Measurements  Intervals                                Axis  Rate:       65                           P:          39  AR:         156                          QRS:        38  QRSD:       94                           T:          39  QT:         400  QTc:        416    Interpretive Statements  SINUS RHYTHM  Compared to ECG 2019 04:25:31  No significant changes  normal sinus rhythm at 65    , no ST elevation no ST depression no T-wave  inversion appropriate R-wave progression normal axis normal intervals no  other  ischemic or arrhythmic features  Electronically Signed On 2020 2:48: 08 PDT by DAYANA OCHOA MD         All labs reviewed by me.      RADIOLOGY  CT-CTA CHEST PULMONARY ARTERY W/ RECONS   Final Result         No evidence of pulmonary embolism.      Lungs are generally clear clear. No pneumonia.               NM-CARDIAC STRESS TEST    (Results Pending)     The radiologist's interpretation of all radiological  "studies have been reviewed by me.    COURSE & MEDICAL DECISION MAKING  Pertinent Labs & Imaging studies reviewed. (See chart for details)    2:47 AM - Patient seen and examined at bedside.     Coagulation studies were ordered in light of chest pain and possible need for anticoagulation    Patient noted to have slightly elevated blood pressure likely circumstantial secondary to presenting complaint. Referred to primary care physician for further evaluation.        Medical Decision Making: CT PE protocol shows no evidence of pulmonary embolism or other abnormalities.  Troponins initially negative her EKG is nonischemic.  Patient 50 years old she was given aspirin and nitroglycerin paste at arrival.  Patient had resolution of her chest pain with application of Nitropaste however reported severe headache.  When Nitropaste removed she stated that her chest pain back.  Patient was discussed with the hospitalist she will be admitted for further evaluation and treatment admitted in guarded condition.    /85   Pulse 70   Temp 36.1 °C (97 °F) (Oral)   Resp 19   Ht 1.6 m (5' 3\")   Wt 92 kg (202 lb 13.2 oz)   LMP 11/21/2015   SpO2 97%   BMI 35.93 kg/m²             FINAL IMPRESSION  1. Chest pain, unspecified type Active             This dictation has been created using voice recognition software and/or scribes. The accuracy of the dictation is limited by the abilities of the software and the expertise of the scribes. I expect there may be some errors of grammar and possibly content. I made every attempt to manually correct the errors within my dictation. However, errors related to voice recognition software and/or scribes may still exist and should be interpreted within the appropriate context.            "

## 2020-09-23 NOTE — DISCHARGE SUMMARY
Discharge Summary    CHIEF COMPLAINT ON ADMISSION  Chief Complaint   Patient presents with   • Flu Like Symptoms   • Chest Pain       Reason for Admission  EMS     Admission Date  9/23/2020    CODE STATUS  Full Code    HPI & HOSPITAL COURSE  This is a 50 y.o. female with history of GERD, hypothyroidism, asthma, here with complaints of chest pain.  For details of admission see H&P note.  Patient was admitted for evaluation for chest pain.  Initial evaluation with serial troponin and EKG has been unremarkable.  CT of pulmonary artery was negative for PE.  There is no change of aortic caliber or aortic aneurysm on CT scanning of the chest seen.  Patient had pharmacological stress test, no abnormalities, including no ischemia or heart failure seen.  Patient was treated with nitroglycerin, that was helpful.  There is strong suspicion that patient is having esophageal dysmotility and GERD exacerbation.  Recommended outpatient GI evaluation    Therefore, she is discharged in fair and stable condition to home with close outpatient follow-up.        Discharge Date  9/23/2020    FOLLOW UP ITEMS POST DISCHARGE  PCP    DISCHARGE DIAGNOSES  Principal Problem:    Other chest pain POA: Yes  Active Problems:    GERD (gastroesophageal reflux disease) POA: Yes    Viral syndrome POA: Yes    Hypothyroidism POA: Yes    Mild intermittent asthma without complication POA: Yes    Obesity (BMI 35.0-39.9 without comorbidity) POA: Yes  Resolved Problems:    * No resolved hospital problems. *      FOLLOW UP  No future appointments.  No follow-up provider specified.    MEDICATIONS ON DISCHARGE     Medication List      START taking these medications      Instructions   hyoscyamine-maalox plus-lidocaine viscous  Commonly known as: GI COCKTAIL   Doctor's comments: Ingredients: 34 mL hyoscyamine 0.125 mg/5 mL, 126 mL Maalox, and 40 mL viscous lidocaine 2%.  Take 30 mL by mouth Once for 1 dose.  Dose: 30 mL        CONTINUE taking these medications       Instructions   albuterol 108 (90 Base) MCG/ACT Aers inhalation aerosol   Inhale 2 Puffs by mouth every four hours as needed for Shortness of Breath.  Dose: 2 Puff     fluticasone 50 MCG/ACT nasal spray  Commonly known as: FLONASE   Spray 2 Sprays in nose 2 times a day as needed. 2 SPRAYS IN EACH NOSTRIL   Indications: Signs and Symptoms of Nose Diseases  Dose: 2 Spray     levothyroxine 137 MCG Tabs  Commonly known as: SYNTHROID   Take 1 Tab by mouth 2 Times a Day.  Dose: 137 mcg     nitrofurantoin 100 MG Caps  Commonly known as: MACROBID   Take 100 mg by mouth every day. ONCE DAILY, no known stop date  Dose: 100 mg     nortriptyline 10 MG Caps  Commonly known as: PAMELOR   Take 10 mg by mouth every bedtime.  Dose: 10 mg     oxybutynin 5 MG Tabs  Commonly known as: DITROPAN   Take 5 mg by mouth every morning. ONCE DAILY  Dose: 5 mg     oxyCODONE-acetaminophen 7.5-325 MG per tablet  Commonly known as: PERCOCET   Take 1 Tab by mouth every 8 hours as needed. Indications: Pain  Dose: 1 Tab     pantoprazole 40 MG Tbec  Commonly known as: PROTONIX   Take 1 Tab by mouth 2 times a day.  Dose: 40 mg     tizanidine 6 MG capsule  Commonly known as: ZANAFLEX   Take 6 mg by mouth 2 times a day as needed.  Dose: 6 mg            Allergies  Allergies   Allergen Reactions   • Propofol      Hypotension, and severe asthma attack   • Ciprofloxacin Rash     Rxn - about 2012 Hives, nausea    • Sulfa Drugs Rash     Rxn - about 2012 Hives, nausea        DIET  Orders Placed This Encounter   Procedures   • Diet Order Cardiac     Standing Status:   Standing     Number of Occurrences:   1     Order Specific Question:   Diet:     Answer:   Cardiac [6]     Order Specific Question:   Miscellaneous modifications:     Answer:   No Decaf, No Caffeine(for test) [11]       ACTIVITY  As tolerated.  Weight bearing as tolerated    CONSULTATIONS  None    PROCEDURES  None    LABORATORY  Lab Results   Component Value Date    SODIUM 140 09/23/2020     POTASSIUM 4.5 09/23/2020    CHLORIDE 103 09/23/2020    CO2 22 09/23/2020    GLUCOSE 105 (H) 09/23/2020    BUN 16 09/23/2020    CREATININE 0.59 09/23/2020    CREATININE 0.8 01/27/2009        Lab Results   Component Value Date    WBC 10.2 09/23/2020    HEMOGLOBIN 14.5 09/23/2020    HEMATOCRIT 44.8 09/23/2020    PLATELETCT 276 09/23/2020      NM-CARDIAC STRESS TEST   Final Result      CT-CTA CHEST PULMONARY ARTERY W/ RECONS   Final Result         No evidence of pulmonary embolism.      Lungs are generally clear clear. No pneumonia.                     Total time of the discharge process exceeds 37 minutes.

## 2020-11-22 ENCOUNTER — HOSPITAL ENCOUNTER (OUTPATIENT)
Facility: MEDICAL CENTER | Age: 50
End: 2020-11-22
Attending: NURSE PRACTITIONER
Payer: MEDICAID

## 2020-11-22 ENCOUNTER — OFFICE VISIT (OUTPATIENT)
Dept: URGENT CARE | Facility: CLINIC | Age: 50
End: 2020-11-22
Payer: MEDICAID

## 2020-11-22 VITALS
RESPIRATION RATE: 13 BRPM | TEMPERATURE: 98 F | DIASTOLIC BLOOD PRESSURE: 86 MMHG | WEIGHT: 200 LBS | BODY MASS INDEX: 33.32 KG/M2 | HEART RATE: 93 BPM | HEIGHT: 65 IN | SYSTOLIC BLOOD PRESSURE: 116 MMHG | OXYGEN SATURATION: 98 %

## 2020-11-22 DIAGNOSIS — J30.1 SEASONAL ALLERGIC RHINITIS DUE TO POLLEN: ICD-10-CM

## 2020-11-22 DIAGNOSIS — B37.9 ANTIBIOTIC-INDUCED YEAST INFECTION: ICD-10-CM

## 2020-11-22 DIAGNOSIS — J01.11 ACUTE RECURRENT FRONTAL SINUSITIS: ICD-10-CM

## 2020-11-22 DIAGNOSIS — T36.95XA ANTIBIOTIC-INDUCED YEAST INFECTION: ICD-10-CM

## 2020-11-22 DIAGNOSIS — R30.0 DYSURIA: ICD-10-CM

## 2020-11-22 PROCEDURE — 87086 URINE CULTURE/COLONY COUNT: CPT

## 2020-11-22 PROCEDURE — 99214 OFFICE O/P EST MOD 30 MIN: CPT | Performed by: NURSE PRACTITIONER

## 2020-11-22 RX ORDER — DOXYCYCLINE HYCLATE 100 MG
100 TABLET ORAL 2 TIMES DAILY
Qty: 14 TAB | Refills: 0 | Status: SHIPPED | OUTPATIENT
Start: 2020-11-22 | End: 2020-11-29

## 2020-11-22 RX ORDER — FLUCONAZOLE 150 MG/1
150 TABLET ORAL DAILY
Qty: 1 TAB | Refills: 0 | Status: SHIPPED | OUTPATIENT
Start: 2020-11-22 | End: 2021-07-02

## 2020-11-22 RX ORDER — PROMETHAZINE HYDROCHLORIDE 25 MG/1
TABLET ORAL
COMMUNITY
Start: 2020-07-24 | End: 2021-11-19

## 2020-11-22 ASSESSMENT — FIBROSIS 4 INDEX: FIB4 SCORE: 1.33

## 2020-11-23 RX ORDER — FLUTICASONE PROPIONATE 50 MCG
1 SPRAY, SUSPENSION (ML) NASAL DAILY
Qty: 16 G | Refills: 0 | Status: SHIPPED | OUTPATIENT
Start: 2020-11-23 | End: 2022-04-11 | Stop reason: SDUPTHER

## 2020-11-23 ASSESSMENT — ENCOUNTER SYMPTOMS
HEADACHES: 0
CHILLS: 0
ABDOMINAL PAIN: 1
CONSTIPATION: 0
DIARRHEA: 0
NERVOUS/ANXIOUS: 0
EYE PAIN: 0
VOMITING: 0
SINUS PAIN: 1
FEVER: 0
DIZZINESS: 0
WEAKNESS: 0
COUGH: 0
NAUSEA: 0
ORTHOPNEA: 0
SHORTNESS OF BREATH: 0
MYALGIAS: 0
SORE THROAT: 1

## 2020-11-23 NOTE — PROGRESS NOTES
Subjective:   Jayashree Carrasco is a 50 y.o. female who presents for UTI and Otalgia (X right jaw pain with ear pain. )       UTI  This is a new problem. The current episode started in the past 7 days. The problem occurs constantly. The problem has been unchanged. Associated symptoms include abdominal pain (suprapubic, mild), congestion and a sore throat. Pertinent negatives include no chest pain, chills, coughing, fever, headaches, myalgias, nausea, rash, vomiting or weakness. Nothing aggravates the symptoms. She has tried drinking for the symptoms. The treatment provided no relief.   Otalgia   There is pain in the right ear. This is a new problem. The current episode started in the past 7 days. The problem occurs constantly. The problem has been waxing and waning. There has been no fever. The pain is moderate. Associated symptoms include abdominal pain (suprapubic, mild) and a sore throat. Pertinent negatives include no coughing, diarrhea, ear discharge, headaches, rash or vomiting. Associated symptoms comments: Sinus pain. She has tried NSAIDs for the symptoms. The treatment provided mild relief.     Pt presents for evaluation of a new problem, reports having a 3 day history of right ear, sinus and jaw pain.  Has a known history of recurrent sinusitis, in particular the right side.  Was established with ENT in the past, however, has not seen them in several years.  She has known environmental allergies, however, does not take antihistamines on a daily basis.  She also complaints of pain with urination and urinary frequency. 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  Has had several recurrent UTIs in the recent past.    Review of Systems   Constitutional: Positive for malaise/fatigue. Negative for chills and fever.   HENT: Positive for congestion, ear pain, sinus pain and sore throat. Negative for ear discharge and tinnitus.    Eyes: Negative for pain.    Respiratory: Negative for cough and shortness of breath.    Cardiovascular: Negative for chest pain, orthopnea and leg swelling.   Gastrointestinal: Positive for abdominal pain (suprapubic, mild). Negative for constipation, diarrhea, nausea and vomiting.   Genitourinary: Negative for dysuria.   Musculoskeletal: Negative for myalgias.   Skin: Negative for rash.   Neurological: Negative for dizziness, weakness and headaches.   Endo/Heme/Allergies: Positive for environmental allergies.   Psychiatric/Behavioral: The patient is not nervous/anxious.    All other systems reviewed and are negative.      MEDS:   Current Outpatient Medications:   •  promethazine (PHENERGAN) 25 MG Tab, PROMETHAZINE HCL 25 MG TABS, Disp: , Rfl:   •  doxycycline (VIBRAMYCIN) 100 MG Tab, Take 1 Tab by mouth 2 times a day for 7 days., Disp: 14 Tab, Rfl: 0  •  fluconazole (DIFLUCAN) 150 MG tablet, Take 1 Tab by mouth every day., Disp: 1 Tab, Rfl: 0  •  albuterol 108 (90 Base) MCG/ACT Aero Soln inhalation aerosol, Inhale 2 Puffs by mouth every four hours as needed for Shortness of Breath., Disp: , Rfl:   •  fluticasone (FLONASE) 50 MCG/ACT nasal spray, Spray 2 Sprays in nose 2 times a day as needed. 2 SPRAYS IN EACH NOSTRIL   Indications: Signs and Symptoms of Nose Diseases, Disp: , Rfl:   •  oxyCODONE-acetaminophen (PERCOCET) 7.5-325 MG per tablet, Take 1 Tab by mouth every 8 hours as needed. Indications: Pain, Disp: , Rfl:   •  levothyroxine (SYNTHROID) 137 MCG Tab, Take 1 Tab by mouth 2 Times a Day., Disp: 96 Tab, Rfl: 3  •  nortriptyline (PAMELOR) 10 MG Cap, Take 10 mg by mouth every bedtime., Disp: , Rfl: 0  •  tizanidine (ZANAFLEX) 6 MG capsule, Take 6 mg by mouth 2 times a day as needed., Disp: , Rfl:   •  pantoprazole (PROTONIX) 40 MG Tablet Delayed Response, Take 1 Tab by mouth 2 times a day., Disp: 60 Tab, Rfl: 0  ALLERGIES:   Allergies   Allergen Reactions   • Propofol      Hypotension, and severe asthma attack   • Ciprofloxacin Rash     " Rxn - about 2012 Hives, nausea    • Sulfa Drugs Rash     Rxn - about 2012 Hives, nausea        Patient's PMH, SocHx, SurgHx, FamHx, Drug allergies and medications were reviewed.     Objective:   /86 (BP Location: Right arm, Patient Position: Sitting, BP Cuff Size: Adult)   Pulse 93   Temp 36.7 °C (98 °F) (Temporal)   Resp 13   Ht 1.66 m (5' 5.35\")   Wt 90.7 kg (200 lb)   LMP 11/21/2015   SpO2 98%   BMI 32.92 kg/m²     Physical Exam  Vitals signs and nursing note reviewed.   Constitutional:       General: She is awake.      Appearance: Normal appearance. She is well-developed and normal weight.   HENT:      Head: Normocephalic and atraumatic.      Right Ear: Tympanic membrane, ear canal and external ear normal.      Left Ear: Tympanic membrane, ear canal and external ear normal.      Nose: Nasal tenderness present.      Right Turbinates: Swollen.      Right Sinus: Maxillary sinus tenderness and frontal sinus tenderness present.      Mouth/Throat:      Mouth: Mucous membranes are moist.      Pharynx: Oropharynx is clear.   Eyes:      Extraocular Movements: Extraocular movements intact.      Conjunctiva/sclera: Conjunctivae normal.      Pupils: Pupils are equal, round, and reactive to light.   Neck:      Musculoskeletal: Full passive range of motion without pain, normal range of motion and neck supple.      Thyroid: No thyromegaly.      Trachea: Trachea normal.   Cardiovascular:      Rate and Rhythm: Normal rate and regular rhythm.      Pulses: Normal pulses.      Heart sounds: Normal heart sounds, S1 normal and S2 normal.   Pulmonary:      Effort: Pulmonary effort is normal. No respiratory distress.      Breath sounds: Normal breath sounds. No wheezing, rhonchi or rales.   Abdominal:      General: Bowel sounds are normal.      Palpations: Abdomen is soft.   Musculoskeletal: Normal range of motion.   Lymphadenopathy:      Cervical: No cervical adenopathy.   Skin:     General: Skin is warm and dry.      " Capillary Refill: Capillary refill takes less than 2 seconds.   Neurological:      General: No focal deficit present.      Mental Status: She is alert and oriented to person, place, and time.      Gait: Gait is intact.   Psychiatric:         Attention and Perception: Attention and perception normal.         Mood and Affect: Mood normal.         Speech: Speech normal.         Behavior: Behavior normal. Behavior is cooperative.         Thought Content: Thought content normal.         Judgment: Judgment normal.         Assessment/Plan:   Assessment    1. Acute recurrent frontal sinusitis  - doxycycline (VIBRAMYCIN) 100 MG Tab; Take 1 Tab by mouth 2 times a day for 7 days.  Dispense: 14 Tab; Refill: 0  - REFERRAL TO ENT  - fluticasone (FLONASE) 50 MCG/ACT nasal spray; Administer 1 Spray into affected nostril(S) every day.  Dispense: 16 g; Refill: 0    2. Antibiotic-induced yeast infection  - fluconazole (DIFLUCAN) 150 MG tablet; Take 1 Tab by mouth every day.  Dispense: 1 Tab; Refill: 0    3. Dysuria  - URINE CULTURE(NEW); Future    4. Seasonal allergic rhinitis due to pollen  - fluticasone (FLONASE) 50 MCG/ACT nasal spray; Administer 1 Spray into affected nostril(S) every day.  Dispense: 16 g; Refill: 0    Reviewed UA that was performed in office today, will send for urine culture.  Encouraged to push fluids and begin cranberry tablets.  Should she have a recurrent UTI, will submit for urology referral due to repeated UTIs.  She will also begin medications for recurrent right frontal and maxillary sinusitis, and will send if referral for ENT.  Supportive care options also discussed for both disease processes and her list of concerns today.  Differential diagnosis, natural history, supportive care, and indications for immediate follow-up were discussed.      Return to urgent care clinic or PCP if current symptoms do not improve and/or worsening symptoms occur. Advised of signs and symptoms which would warrant further  evaluation and /or emergent evaluation in ER.  All questions answered and the patient agrees to the plan of care.

## 2020-11-24 DIAGNOSIS — R30.0 DYSURIA: ICD-10-CM

## 2020-11-26 LAB
BACTERIA UR CULT: NORMAL
SIGNIFICANT IND 70042: NORMAL
SITE SITE: NORMAL
SOURCE SOURCE: NORMAL

## 2021-06-01 ENCOUNTER — PRE-ADMISSION TESTING (OUTPATIENT)
Dept: ADMISSIONS | Facility: MEDICAL CENTER | Age: 51
End: 2021-06-01
Attending: INTERNAL MEDICINE
Payer: MEDICAID

## 2021-06-01 DIAGNOSIS — Z01.812 PRE-OPERATIVE LABORATORY EXAMINATION: ICD-10-CM

## 2021-06-01 LAB
COVID ORDER STATUS COVID19: NORMAL
SARS-COV-2 RNA RESP QL NAA+PROBE: NOTDETECTED
SPECIMEN SOURCE: NORMAL

## 2021-06-01 PROCEDURE — C9803 HOPD COVID-19 SPEC COLLECT: HCPCS

## 2021-06-01 PROCEDURE — U0003 INFECTIOUS AGENT DETECTION BY NUCLEIC ACID (DNA OR RNA); SEVERE ACUTE RESPIRATORY SYNDROME CORONAVIRUS 2 (SARS-COV-2) (CORONAVIRUS DISEASE [COVID-19]), AMPLIFIED PROBE TECHNIQUE, MAKING USE OF HIGH THROUGHPUT TECHNOLOGIES AS DESCRIBED BY CMS-2020-01-R: HCPCS

## 2021-06-01 PROCEDURE — U0005 INFEC AGEN DETEC AMPLI PROBE: HCPCS

## 2021-06-02 ENCOUNTER — PRE-ADMISSION TESTING (OUTPATIENT)
Dept: ADMISSIONS | Facility: MEDICAL CENTER | Age: 51
End: 2021-06-02
Attending: INTERNAL MEDICINE
Payer: MEDICAID

## 2021-06-02 RX ORDER — IBUPROFEN 800 MG/1
800 TABLET ORAL PRN
Status: ON HOLD | COMMUNITY
End: 2021-06-04

## 2021-06-02 RX ORDER — ERGOCALCIFEROL 1.25 MG/1
CAPSULE ORAL
COMMUNITY
End: 2022-01-19 | Stop reason: SDUPTHER

## 2021-06-04 ENCOUNTER — HOSPITAL ENCOUNTER (OUTPATIENT)
Facility: MEDICAL CENTER | Age: 51
End: 2021-06-04
Attending: INTERNAL MEDICINE | Admitting: INTERNAL MEDICINE
Payer: MEDICAID

## 2021-06-04 ENCOUNTER — ANESTHESIA (OUTPATIENT)
Dept: SURGERY | Facility: MEDICAL CENTER | Age: 51
End: 2021-06-04
Payer: MEDICAID

## 2021-06-04 ENCOUNTER — ANESTHESIA EVENT (OUTPATIENT)
Dept: SURGERY | Facility: MEDICAL CENTER | Age: 51
End: 2021-06-04
Payer: MEDICAID

## 2021-06-04 VITALS
OXYGEN SATURATION: 96 % | HEIGHT: 63 IN | RESPIRATION RATE: 16 BRPM | SYSTOLIC BLOOD PRESSURE: 134 MMHG | TEMPERATURE: 98.8 F | BODY MASS INDEX: 39.45 KG/M2 | DIASTOLIC BLOOD PRESSURE: 72 MMHG | HEART RATE: 93 BPM | WEIGHT: 222.66 LBS

## 2021-06-04 LAB — PATHOLOGY CONSULT NOTE: NORMAL

## 2021-06-04 PROCEDURE — 160203 HCHG ENDO MINUTES - 1ST 30 MINS LEVEL 4: Performed by: INTERNAL MEDICINE

## 2021-06-04 PROCEDURE — 160002 HCHG RECOVERY MINUTES (STAT): Performed by: INTERNAL MEDICINE

## 2021-06-04 PROCEDURE — 700111 HCHG RX REV CODE 636 W/ 250 OVERRIDE (IP)

## 2021-06-04 PROCEDURE — 700101 HCHG RX REV CODE 250

## 2021-06-04 PROCEDURE — 700105 HCHG RX REV CODE 258: Performed by: INTERNAL MEDICINE

## 2021-06-04 PROCEDURE — A9270 NON-COVERED ITEM OR SERVICE: HCPCS | Performed by: INTERNAL MEDICINE

## 2021-06-04 PROCEDURE — 88305 TISSUE EXAM BY PATHOLOGIST: CPT | Mod: 59

## 2021-06-04 PROCEDURE — 700111 HCHG RX REV CODE 636 W/ 250 OVERRIDE (IP): Performed by: INTERNAL MEDICINE

## 2021-06-04 PROCEDURE — 160035 HCHG PACU - 1ST 60 MINS PHASE I: Performed by: INTERNAL MEDICINE

## 2021-06-04 PROCEDURE — 160025 RECOVERY II MINUTES (STATS): Performed by: INTERNAL MEDICINE

## 2021-06-04 PROCEDURE — 88312 SPECIAL STAINS GROUP 1: CPT

## 2021-06-04 PROCEDURE — 160048 HCHG OR STATISTICAL LEVEL 1-5: Performed by: INTERNAL MEDICINE

## 2021-06-04 PROCEDURE — C1726 CATH, BAL DIL, NON-VASCULAR: HCPCS | Performed by: INTERNAL MEDICINE

## 2021-06-04 PROCEDURE — 160046 HCHG PACU - 1ST 60 MINS PHASE II: Performed by: INTERNAL MEDICINE

## 2021-06-04 PROCEDURE — 160036 HCHG PACU - EA ADDL 30 MINS PHASE I: Performed by: INTERNAL MEDICINE

## 2021-06-04 PROCEDURE — 700101 HCHG RX REV CODE 250: Performed by: INTERNAL MEDICINE

## 2021-06-04 PROCEDURE — 160009 HCHG ANES TIME/MIN: Performed by: INTERNAL MEDICINE

## 2021-06-04 RX ORDER — HALOPERIDOL 5 MG/ML
1 INJECTION INTRAMUSCULAR
Status: DISCONTINUED | OUTPATIENT
Start: 2021-06-04 | End: 2021-06-04 | Stop reason: HOSPADM

## 2021-06-04 RX ORDER — ONDANSETRON 2 MG/ML
4 INJECTION INTRAMUSCULAR; INTRAVENOUS
Status: COMPLETED | OUTPATIENT
Start: 2021-06-04 | End: 2021-06-04

## 2021-06-04 RX ORDER — TRIAMCINOLONE ACETONIDE 40 MG/ML
INJECTION, SUSPENSION INTRA-ARTICULAR; INTRAMUSCULAR
Status: DISCONTINUED
Start: 2021-06-04 | End: 2021-06-04 | Stop reason: HOSPADM

## 2021-06-04 RX ORDER — TRIAMCINOLONE ACETONIDE 40 MG/ML
INJECTION, SUSPENSION INTRA-ARTICULAR; INTRAMUSCULAR
Status: DISCONTINUED | OUTPATIENT
Start: 2021-06-04 | End: 2021-06-04 | Stop reason: HOSPADM

## 2021-06-04 RX ORDER — SODIUM CHLORIDE, SODIUM LACTATE, POTASSIUM CHLORIDE, CALCIUM CHLORIDE 600; 310; 30; 20 MG/100ML; MG/100ML; MG/100ML; MG/100ML
INJECTION, SOLUTION INTRAVENOUS CONTINUOUS
Status: DISCONTINUED | OUTPATIENT
Start: 2021-06-04 | End: 2021-06-04 | Stop reason: HOSPADM

## 2021-06-04 RX ORDER — DEXAMETHASONE SODIUM PHOSPHATE 4 MG/ML
INJECTION, SOLUTION INTRA-ARTICULAR; INTRALESIONAL; INTRAMUSCULAR; INTRAVENOUS; SOFT TISSUE PRN
Status: DISCONTINUED | OUTPATIENT
Start: 2021-06-04 | End: 2021-06-04 | Stop reason: SURG

## 2021-06-04 RX ORDER — ONDANSETRON 2 MG/ML
INJECTION INTRAMUSCULAR; INTRAVENOUS PRN
Status: DISCONTINUED | OUTPATIENT
Start: 2021-06-04 | End: 2021-06-04 | Stop reason: SURG

## 2021-06-04 RX ORDER — DIPHENHYDRAMINE HYDROCHLORIDE 50 MG/ML
12.5 INJECTION INTRAMUSCULAR; INTRAVENOUS
Status: DISCONTINUED | OUTPATIENT
Start: 2021-06-04 | End: 2021-06-04 | Stop reason: HOSPADM

## 2021-06-04 RX ADMIN — ONDANSETRON 8 MG: 2 INJECTION INTRAMUSCULAR; INTRAVENOUS at 09:28

## 2021-06-04 RX ADMIN — POVIDONE IODINE 15 ML: 100 SOLUTION TOPICAL at 08:28

## 2021-06-04 RX ADMIN — ROCURONIUM BROMIDE 30 MG: 10 INJECTION, SOLUTION INTRAVENOUS at 09:27

## 2021-06-04 RX ADMIN — DEXAMETHASONE SODIUM PHOSPHATE 8 MG: 4 INJECTION, SOLUTION INTRA-ARTICULAR; INTRALESIONAL; INTRAMUSCULAR; INTRAVENOUS; SOFT TISSUE at 09:27

## 2021-06-04 RX ADMIN — FENTANYL CITRATE 25 MCG: 50 INJECTION, SOLUTION INTRAMUSCULAR; INTRAVENOUS at 10:48

## 2021-06-04 RX ADMIN — SUGAMMADEX 200 MG: 100 INJECTION, SOLUTION INTRAVENOUS at 09:51

## 2021-06-04 RX ADMIN — MIDAZOLAM 2 MG: 1 INJECTION INTRAMUSCULAR; INTRAVENOUS at 09:26

## 2021-06-04 RX ADMIN — ONDANSETRON 4 MG: 2 INJECTION INTRAMUSCULAR; INTRAVENOUS at 10:10

## 2021-06-04 RX ADMIN — FENTANYL CITRATE 100 MCG: 50 INJECTION, SOLUTION INTRAMUSCULAR; INTRAVENOUS at 09:26

## 2021-06-04 RX ADMIN — SODIUM CHLORIDE, POTASSIUM CHLORIDE, SODIUM LACTATE AND CALCIUM CHLORIDE: 600; 310; 30; 20 INJECTION, SOLUTION INTRAVENOUS at 08:36

## 2021-06-04 RX ADMIN — FENTANYL CITRATE 25 MCG: 50 INJECTION, SOLUTION INTRAMUSCULAR; INTRAVENOUS at 10:23

## 2021-06-04 ASSESSMENT — PAIN DESCRIPTION - PAIN TYPE
TYPE: SURGICAL PAIN

## 2021-06-04 NOTE — ANESTHESIA PROCEDURE NOTES
Airway    Date/Time: 6/4/2021 9:28 AM  Performed by: Lake Joshi M.D.  Authorized by: Lake Joshi M.D.     Location:  OR  Urgency:  Elective  Indications for Airway Management:  Anesthesia      Spontaneous Ventilation: absent    Sedation Level:  Deep  Preoxygenated: Yes    Patient Position:  Sniffing  Final Airway Type:  Endotracheal airway  Final Endotracheal Airway:  ETT  Cuffed: Yes    Technique Used for Successful ETT Placement:  Direct laryngoscopy    Insertion Site:  Oral  Blade Type:  William  Laryngoscope Blade/Videolaryngoscope Blade Size:  3  ETT Size (mm):  7.5  Measured from:  Teeth  ETT to Teeth (cm):  22  Placement Verified by: auscultation and capnometry    Cormack-Lehane Classification:  Grade I - full view of glottis  Number of Attempts at Approach:  1

## 2021-06-04 NOTE — ANESTHESIA POSTPROCEDURE EVALUATION
Patient: Jayashree Carrasco    Procedure Summary     Date: 06/04/21 Room / Location: [facility] ROOM 26 / SURGERY SAME DAY [facility]    Anesthesia Start: 0926 Anesthesia Stop: 1000    Procedures:       GASTROSCOPY (N/A Esophagus)      GASTROSCOPY, WITH SCLEROTHERAPY (N/A Esophagus)      GASTROSCOPY, WITH BIOPSY (N/A Esophagus)      GASTROSCOPY, WITH BALLOON DILATION (N/A Esophagus) Diagnosis: (ESOPHAGEAL STRICTURE)    Surgeons: Maira Nolasco D.O. Responsible Provider: Lake Joshi M.D.    Anesthesia Type: general ASA Status: 3          Final Anesthesia Type: general  Last vitals  BP   Blood Pressure: 125/76    Temp   36.2 °C (97.1 °F)    Pulse   65   Resp   18    SpO2   97 %      Anesthesia Post Evaluation    Patient location during evaluation: PACU  Patient participation: complete - patient participated  Level of consciousness: awake and alert    Airway patency: patent  Anesthetic complications: no  Cardiovascular status: hemodynamically stable  Respiratory status: acceptable  Hydration status: euvolemic    PONV: none          No complications documented.     Nurse Pain Score: 0 (NPRS)

## 2021-06-04 NOTE — OP REPORT
EGD Report    Date:  6/4/2021    Surgeon: Maira Nolasco D.O., [facility]    Indications: Solid/liquid dysphagia, history of esophageal stricture, GERD    Procedure: Diagnostic EGD dilation, submucosal injection, and biopsy    Procedure duration: 15 minutes      Procedure in detail, Findings and Endoscopic Diagnosis:      -Prior to the procedure, a History and Physical was performed, and patient medications and allergies were reviewed. The patient’s tolerance of previous anesthesia was also reviewed. The risks and benefits of the procedure and the sedation options and risks were discussed with the patient. All questions were answered, and informed consent was obtained. The patient was deemed in satisfactory condition to undergo the procedure.    -Prior Anticoagulants: the patient has taken no previous anticoagulant or antiplatelet agents.    -ASA Grade Assessment: see anesthesia note     Anesthesia/medications:  see anesthesia note     -The patient was placed in the left lateral decubitus position. The scope was passed under direct vision. Continuous oxygen was provided via nasal cannula and intravenous sedation was administered in divided doses throughout the procedure. The patient’s blood pressure, pulse, and oxygen saturation were monitored continuously throughout the procedure.    -The adult gastroscope was gently advanced under direct visualization over the tongue, down the esophagus, through the stomach and into the 2nd portion of the duodenum. The color, texture, mucosa and anatomy of esophagus, stomach and duodenum were carefully examined with the scope. The scope was then withdrawn from the patient and the procedure terminated. Further details are in the finding section, based on anatomical location.  Retroflexion was performed in the stomach.    -The patient tolerated the procedure well and there were no immediate complications. The patient was then transferred to the recovery room in stable condition.    EBL:  Minimal    Complications:  No immediate complications      Findings:  Duodenum: The visualized portions of duodenal mucosa were normal in appearance.    Stomach: There was mild patchy mucosal erythema in the gastric antrum.  Multiple biopsies were obtained from the gastric antrum and body using a cold biopsy forceps.  The tissue was submitted to pathology.    Esophagus: The DH was located 39 cm from the incisors.  The EGF was located 39 cm from the incisors.  The ECJ was located 39 cm from the incisors.  There was a distal esophageal stricture which was easily traversable with the adult endoscope.  A total of 80 mg of Kenalog and 8 mL was successfully injected into the stricture.  An 18-19-20 mm TTS dilation was performed.  The balloon dilator was passed through the scope and positioned bridging the GEJ.  The balloon was slowly inflated but a mucosal rent was noted and the balloon reached in diameter of approximately 15-16 mm.  The balloon was then deflated and removed.  The esophageal mucosa was otherwise normal in appearance.  Multiple biopsies were obtained from the proximal and distal esophagus using a cold biopsy forceps.  The tissue was submitted to pathology.    Summary of Findings:  -Normal duodenum  -Erythematous gastropathy; biopsied  -Esophageal stricture; 80 mg Kenalog injected, 15-16mm TTS dilation  -Otherwise normal esophagus; biopsied        Recommendations:  -A letter will be sent regarding to the biopsy result in about 2-4 weeks  -Discharge home and PACU criteria met  -Resume a clear liquid diet today and slowly advance to a regular diet as tolerated tomorrow  -Resume current medications  -Avoid NSAIDs  -Start sucralfate 1 g solution 4 times daily x2 weeks  -Continue Protonix 40 mg twice daily taken 30 minutes prior to meal  -Repeat EGD in 4-8 weeks        Maira Nolasco DO, FACP    The patient should follow up with Dr. Maira Nolasco at [facility] office in 4 weeks after discharge.   If the patient does not already have an appointment or they have additional questions, recommend the patient call the clinic at [phone] to schedule an appointment.

## 2021-06-04 NOTE — OR NURSING
1015  Report received and care assumed. Pt awake, vitals stable.    1023 Pt medicated for headache and neck pain.    1048 Pt medicated again for pain. Dr. Nolasco here at bedside to check on patient.     1100 Pt sleeping.    1130 Pt awake and feels ready for discharge. Pt up to get dressed. Discharge instructions given with daughter on phone, all questions answered.    1140 Pt discharged to home with daughter, ambulated to car.

## 2021-06-04 NOTE — ANESTHESIA TIME REPORT
Anesthesia Start and Stop Event Times     Date Time Event    6/4/2021 0908 Ready for Procedure     0926 Anesthesia Start     1000 Anesthesia Stop        Responsible Staff  06/04/21    Name Role Begin End    Lake Joshi M.D. Anesth 0926 1000        Preop Diagnosis (Free Text):  Pre-op Diagnosis     ESOPHAGEAL DYSPHAGIA        Preop Diagnosis (Codes):    Post op Diagnosis  Dysphagia      Premium Reason  Non-Premium    Comments:

## 2021-06-04 NOTE — OR NURSING
0957 Pt arrived from OR with Dr. Joshi.  Pt VSS, PIV infusing without issue.  Pt with cough, sore throat.  O2 in place.  1010 Pt complaints of nausea, medicated with IV zofran for nausea.    1015 Report to Herlinda WHEATLEY.

## 2021-06-04 NOTE — DISCHARGE INSTRUCTIONS
ACTIVITY: Rest and take it easy for the first 24 hours.  A responsible adult is recommended to remain with you during that time.  It is normal to feel sleepy.  We encourage you to not do anything that requires balance, judgment or coordination.    MILD FLU-LIKE SYMPTOMS ARE NORMAL. YOU MAY EXPERIENCE GENERALIZED MUSCLE ACHES, THROAT IRRITATION, HEADACHE AND/OR SOME NAUSEA.    FOR 24 HOURS DO NOT:  Drive, operate machinery or run household appliances.  Drink beer or alcoholic beverages.   Make important decisions or sign legal documents.    SPECIAL INSTRUCTIONS: FOLLOW INSTRUCTIONS FROM DR. WELLER.  CLEAR LIQUID DIET TODAY.    DIET: To avoid nausea, slowly advance diet as tolerated, avoiding spicy or greasy foods for the first day.  Add more substantial food to your diet according to your physician's instructions.  Babies can be fed formula or breast milk as soon as they are hungry.  INCREASE FLUIDS AND FIBER TO AVOID CONSTIPATION.    SURGICAL DRESSING/BATHING: *NO RESTRICTIONS.**    FOLLOW-UP APPOINTMENT:  A follow-up appointment should be arranged with your doctor in *CALL TO SCHEDULE FOLLOW UP APPOINTMENT**    You should CALL YOUR PHYSICIAN if you develop:  Fever greater than 101 degrees F.  Pain not relieved by medication, or persistent nausea or vomiting.  Excessive bleeding (blood soaking through dressing) or unexpected drainage from the wound.  Extreme redness or swelling around the incision site, drainage of pus or foul smelling drainage.  Inability to urinate or empty your bladder within 8 hours.  Problems with breathing or chest pain.    You should call 911 if you develop problems with breathing or chest pain.  If you are unable to contact your doctor or surgical center, you should go to the nearest emergency room or urgent care center.  Physician's telephone #: **[phone]*    If any questions arise, call your doctor.  If your doctor is not available, please feel free to call the Surgical Center at  [phone]. The Contact Center is open Monday through Friday 7AM to 5PM and may speak to a nurse at [phone], or toll free at [phone].     A registered nurse may call you a few days after your surgery to see how you are doing after your procedure.    MEDICATIONS: Resume taking daily medication.  Take prescribed pain medication with food.  If no medication is prescribed, you may take non-aspirin pain medication if needed.  PAIN MEDICATION CAN BE VERY CONSTIPATING.  Take a stool softener or laxative such as senokot, pericolace, or milk of magnesia if needed.    Prescription given for *CARAFATE**.  Last pain medication given at *NONE GIVEN**.    If your physician has prescribed pain medication that includes Acetaminophen (Tylenol), do not take additional Acetaminophen (Tylenol) while taking the prescribed medication.    Depression / Suicide Risk    As you are discharged from this [facility] facility, it is important to learn how to keep safe from harming yourself.    Recognize the warning signs:  · Abrupt changes in personality, positive or negative- including increase in energy   · Giving away possessions  · Change in eating patterns- significant weight changes-  positive or negative  · Change in sleeping patterns- unable to sleep or sleeping all the time   · Unwillingness or inability to communicate  · Depression  · Unusual sadness, discouragement and loneliness  · Talk of wanting to die  · Neglect of personal appearance   · Rebelliousness- reckless behavior  · Withdrawal from people/activities they love  · Confusion- inability to concentrate     If you or a loved one observes any of these behaviors or has concerns about self-harm, here's what you can do:  · Talk about it- your feelings and reasons for harming yourself  · Remove any means that you might use to hurt yourself (examples: pills, rope, extension cords, firearm)  · Get professional help from the community (Mental Health, Substance Abuse,  psychological counseling)  · Do not be alone:Call your Safe Contact- someone whom you trust who will be there for you.  · Call your local CRISIS HOTLINE 075-6649 or 215-362-6282  · Call your local Children's Mobile Crisis Response Team [place] (598) 470-3834 or www.[employer] ([employer])  · Call the toll free National Suicide Prevention Hotlines   · National Suicide Prevention Lifeline 178-789-YOKP (3082)  · National Hope Line Network 800-SUICIDE (667-5845)

## 2021-06-05 ENCOUNTER — HOSPITAL ENCOUNTER (EMERGENCY)
Facility: MEDICAL CENTER | Age: 51
End: 2021-06-05
Payer: MEDICAID

## 2021-06-05 VITALS
HEART RATE: 95 BPM | RESPIRATION RATE: 18 BRPM | OXYGEN SATURATION: 94 % | DIASTOLIC BLOOD PRESSURE: 71 MMHG | SYSTOLIC BLOOD PRESSURE: 117 MMHG | TEMPERATURE: 97.8 F | HEIGHT: 63 IN | BODY MASS INDEX: 39.77 KG/M2 | WEIGHT: 224.43 LBS

## 2021-06-05 PROCEDURE — 302449 STATCHG TRIAGE ONLY (STATISTIC)

## 2021-06-06 NOTE — ED TRIAGE NOTES
"Chief Complaint   Patient presents with   • Post-Op Complications     Pt states she received an endoscopy yesterday and had biopsies taken. Pt states that she was told she had a tear in her esophagus after her procedure and wasn't able to get the whole procedure and dilation of the esophagus done. Today pt states, \"I feel like I was hit by a truck, my whole body hurts, I feel like I have a fever.\" Pt is diaphoretic in triage.        /71   Pulse 95   Temp 36.6 °C (97.8 °F) (Oral)   Resp 18   Ht 1.6 m (5' 3\")   Wt 102 kg (224 lb 6.9 oz)   LMP 11/21/2015   SpO2 94%   BMI 39.76 kg/m²     Pt is ambulatory in and out of triage. Appropriate PPE worn throughout entire encounter. Pt placed back in the lobby and educated about triage process.    "

## 2021-07-02 ENCOUNTER — PRE-ADMISSION TESTING (OUTPATIENT)
Dept: ADMISSIONS | Facility: MEDICAL CENTER | Age: 51
End: 2021-07-02
Attending: INTERNAL MEDICINE
Payer: MEDICAID

## 2021-07-02 VITALS — WEIGHT: 224.87 LBS | BODY MASS INDEX: 38.39 KG/M2 | HEIGHT: 64 IN

## 2021-07-02 RX ORDER — CETIRIZINE HYDROCHLORIDE 10 MG/1
10 TABLET ORAL DAILY
COMMUNITY
End: 2022-02-03

## 2021-07-02 RX ORDER — METRONIDAZOLE 500 MG/1
500 TABLET ORAL 3 TIMES DAILY
COMMUNITY
End: 2021-11-19

## 2021-07-02 NOTE — PREPROCEDURE INSTRUCTIONS
"Preadmit Phone appointment: \" Preparing for your Procedure information\" Instructions discussed with Patient.   Patient instructed to continue prescribed medications through the day before surgery, instructed to take the following medications the day of surgery per anesthesia protocol:  Albuterol INH PRN, Flonase PRN, Synthroid, Percocet, Protonix.     Pt states, \"no issues with anesthesia\".  Fasting guidelines discussed with Patient with NPO per GI doctor's instructions prior to surgery.   All Pt's questions and concerns answered or addressed.  Emailed all instructions.   Pt's daughter and grandchildren live with her, the grandchildren stayed at there Aunts house the weekend of 6/26 and the Aunt developed symptoms and went to the hospital and tested positive for COVID on 7/2/2021.  Placed a call to Pt after discussing with Manager and supervisor that is would be best to get the grandchildren tested and schedule Jayashree the Pt for a COVID test. COVID test   "

## 2021-07-08 ENCOUNTER — HOSPITAL ENCOUNTER (OUTPATIENT)
Facility: MEDICAL CENTER | Age: 51
End: 2021-07-08
Attending: INTERNAL MEDICINE | Admitting: INTERNAL MEDICINE
Payer: MEDICAID

## 2021-07-08 ENCOUNTER — OFFICE VISIT (OUTPATIENT)
Dept: URGENT CARE | Facility: CLINIC | Age: 51
End: 2021-07-08
Payer: MEDICAID

## 2021-07-08 ENCOUNTER — HOSPITAL ENCOUNTER (OUTPATIENT)
Facility: MEDICAL CENTER | Age: 51
End: 2021-07-08
Attending: NURSE PRACTITIONER
Payer: MEDICAID

## 2021-07-08 VITALS
HEART RATE: 99 BPM | TEMPERATURE: 97.2 F | WEIGHT: 222.2 LBS | OXYGEN SATURATION: 98 % | SYSTOLIC BLOOD PRESSURE: 138 MMHG | DIASTOLIC BLOOD PRESSURE: 82 MMHG | HEIGHT: 63 IN | BODY MASS INDEX: 39.37 KG/M2 | RESPIRATION RATE: 16 BRPM

## 2021-07-08 DIAGNOSIS — J02.9 PHARYNGITIS, UNSPECIFIED ETIOLOGY: ICD-10-CM

## 2021-07-08 DIAGNOSIS — Z20.822 SUSPECTED COVID-19 VIRUS INFECTION: ICD-10-CM

## 2021-07-08 PROBLEM — R35.0 URINARY FREQUENCY: Status: ACTIVE | Noted: 2019-01-24

## 2021-07-08 PROBLEM — R22.30 AXILLARY MASS: Status: ACTIVE | Noted: 2020-06-02

## 2021-07-08 PROBLEM — N39.0 CHRONIC UTI: Status: ACTIVE | Noted: 2017-12-19

## 2021-07-08 PROBLEM — D17.9 LIPOMA: Status: ACTIVE | Noted: 2020-06-24

## 2021-07-08 PROBLEM — J30.2 SEASONAL ALLERGIES: Status: ACTIVE | Noted: 2018-04-12

## 2021-07-08 PROBLEM — E66.01 MORBID OBESITY (HCC): Status: ACTIVE | Noted: 2017-06-13

## 2021-07-08 PROBLEM — K22.2 STRICTURE OF ESOPHAGUS: Status: ACTIVE | Noted: 2020-04-13

## 2021-07-08 PROBLEM — L82.1 SEBORRHEIC KERATOSIS: Status: ACTIVE | Noted: 2020-06-02

## 2021-07-08 PROBLEM — M72.2 PLANTAR FASCIITIS, RIGHT: Status: ACTIVE | Noted: 2018-04-12

## 2021-07-08 PROBLEM — H66.90 ACUTE OTITIS MEDIA: Status: ACTIVE | Noted: 2019-01-24

## 2021-07-08 PROBLEM — Z85.850 HX OF THYROID CANCER: Status: ACTIVE | Noted: 2021-04-08

## 2021-07-08 PROBLEM — R90.89 ABNORMAL BRAIN MRI: Status: ACTIVE | Noted: 2018-09-27

## 2021-07-08 PROBLEM — J45.909 ASTHMA: Status: ACTIVE | Noted: 2017-06-13

## 2021-07-08 LAB
INT CON NEG: NORMAL
INT CON POS: NORMAL
S PYO AG THROAT QL: NEGATIVE

## 2021-07-08 PROCEDURE — 87880 STREP A ASSAY W/OPTIC: CPT | Mod: QW | Performed by: NURSE PRACTITIONER

## 2021-07-08 PROCEDURE — 99213 OFFICE O/P EST LOW 20 MIN: CPT | Mod: CS | Performed by: NURSE PRACTITIONER

## 2021-07-08 PROCEDURE — U0003 INFECTIOUS AGENT DETECTION BY NUCLEIC ACID (DNA OR RNA); SEVERE ACUTE RESPIRATORY SYNDROME CORONAVIRUS 2 (SARS-COV-2) (CORONAVIRUS DISEASE [COVID-19]), AMPLIFIED PROBE TECHNIQUE, MAKING USE OF HIGH THROUGHPUT TECHNOLOGIES AS DESCRIBED BY CMS-2020-01-R: HCPCS

## 2021-07-08 PROCEDURE — U0005 INFEC AGEN DETEC AMPLI PROBE: HCPCS

## 2021-07-08 ASSESSMENT — ENCOUNTER SYMPTOMS
FEVER: 0
NAUSEA: 0
DIZZINESS: 0
MYALGIAS: 0
RHINORRHEA: 1
COUGH: 1
VOMITING: 0
HEADACHES: 1
CHILLS: 1
EYE REDNESS: 0
SHORTNESS OF BREATH: 1
SPUTUM PRODUCTION: 0
WHEEZING: 0
NECK PAIN: 0
SORE THROAT: 1

## 2021-07-09 DIAGNOSIS — J02.9 PHARYNGITIS, UNSPECIFIED ETIOLOGY: ICD-10-CM

## 2021-07-09 DIAGNOSIS — Z20.822 SUSPECTED COVID-19 VIRUS INFECTION: ICD-10-CM

## 2021-07-09 NOTE — PROGRESS NOTES
Subjective:   Jayashree Carrasco is a 51 y.o. female who presents for Sore Throat (pt exposed to covid pt having headache, congestion )      URI   This is a new problem. Episode onset: 3 days; exposed to COVID. The problem has been unchanged. There has been no fever. Associated symptoms include congestion, coughing, headaches, rhinorrhea and a sore throat. Pertinent negatives include no chest pain, dysuria, ear pain, joint pain, nausea, neck pain, rash, vomiting or wheezing. She has tried acetaminophen for the symptoms. The treatment provided no relief.       Review of Systems   Constitutional: Positive for chills and malaise/fatigue. Negative for fever.   HENT: Positive for congestion, rhinorrhea and sore throat. Negative for ear pain.    Eyes: Negative for redness.   Respiratory: Positive for cough and shortness of breath. Negative for sputum production and wheezing.    Cardiovascular: Negative for chest pain.   Gastrointestinal: Negative for nausea and vomiting.   Genitourinary: Negative for dysuria.   Musculoskeletal: Negative for joint pain, myalgias and neck pain.   Skin: Negative for rash.   Neurological: Positive for headaches. Negative for dizziness.       Medications:    • albuterol Aers  • cetirizine Tabs  • fluticasone  • levothyroxine Tabs  • Lidocaine Oint  • metroNIDAZOLE Tabs  • NON SPECIFIED  • nortriptyline Caps  • oxyCODONE-acetaminophen  • pantoprazole Tbec  • promethazine Tabs  • tizanidine  • vitamin D (Ergocalciferol) Caps    Allergies: Propofol, Ciprofloxacin, Seasonal, and Sulfa drugs    Problem List: Jayashree Carrasco does not have any pertinent problems on file.    Surgical History:  Past Surgical History:   Procedure Laterality Date   • PB UPPER GI ENDOSCOPY,DIAGNOSIS N/A 6/4/2021    Procedure: GASTROSCOPY;  Surgeon: Maira Nolasco D.O.;  Location: SURGERY SAME DAY [facility];  Service: Gastroenterology   • PB UPPER GI ENDOSCOPY,SCLER INJECT N/A 6/4/2021    Procedure: GASTROSCOPY, WITH  "SCLEROTHERAPY;  Surgeon: Maira Nolasco D.O.;  Location: SURGERY SAME DAY [facility];  Service: Gastroenterology   • PB UPPER GI ENDOSCOPY,BIOPSY N/A 6/4/2021    Procedure: GASTROSCOPY, WITH BIOPSY;  Surgeon: Maira Nolasco D.O.;  Location: SURGERY SAME DAY [facility];  Service: Gastroenterology   • PB UPPER GI ENDOSCOPY,W/DILAT,GASTRIC OUT N/A 6/4/2021    Procedure: GASTROSCOPY, WITH BALLOON DILATION;  Surgeon: Maira Nolasco D.O.;  Location: SURGERY SAME DAY [facility];  Service: Gastroenterology   • CHOLECYSTECTOMY  2003   • THYROIDECTOMY  1999   • ABDOMINAL HYSTERECTOMY TOTAL         Past Social Hx: Jayashree Carrasco  reports that she has been smoking cigarettes. She has smoked for the past 20.00 years. She has never used smokeless tobacco. She reports current alcohol use. She reports that she does not use drugs.     Past Family Hx:  Jayashree Carrasco family history includes Cancer in her mother; Cancer (age of onset: 51) in her cousin; Cancer (age of onset: 72) in her maternal aunt; Diabetes in her maternal grandfather; Stroke (age of onset: 40) in her mother.     Problem list, medications, and allergies reviewed by myself today in Epic.     Objective:     /82 (BP Location: Left arm, Patient Position: Sitting, BP Cuff Size: Adult)   Pulse 99   Temp 36.2 °C (97.2 °F) (Temporal)   Resp 16   Ht 1.6 m (5' 3\")   Wt 101 kg (222 lb 3.2 oz)   LMP 11/21/2015   SpO2 98%   BMI 39.36 kg/m²     Physical Exam  Vitals and nursing note reviewed.   Constitutional:       General: She is not in acute distress.     Appearance: She is well-developed.   HENT:      Head: Normocephalic and atraumatic.      Right Ear: Tympanic membrane and external ear normal.      Left Ear: Tympanic membrane and external ear normal.      Nose: Nose normal.      Right Sinus: No maxillary sinus tenderness or frontal sinus tenderness.      Left Sinus: No maxillary sinus tenderness or frontal sinus tenderness.      Mouth/Throat:      Mouth: " Mucous membranes are moist.      Pharynx: Uvula midline. No posterior oropharyngeal erythema.      Tonsils: No tonsillar exudate or tonsillar abscesses.   Eyes:      General:         Right eye: No discharge.         Left eye: No discharge.      Conjunctiva/sclera: Conjunctivae normal.   Cardiovascular:      Rate and Rhythm: Normal rate.   Pulmonary:      Effort: Pulmonary effort is normal. No respiratory distress.      Breath sounds: Normal breath sounds.   Abdominal:      General: There is no distension.   Musculoskeletal:         General: Normal range of motion.   Skin:     General: Skin is warm and dry.   Neurological:      General: No focal deficit present.      Mental Status: She is alert and oriented to person, place, and time. Mental status is at baseline.      Gait: Gait (gait at baseline) normal.   Psychiatric:         Judgment: Judgment normal.         Assessment/Plan:     Diagnosis and associated orders:     1. Pharyngitis, unspecified etiology  POCT Rapid Strep A    COVID/SARS CoV-2 PCR   2. Suspected COVID-19 virus infection  POCT Rapid Strep A    COVID/SARS CoV-2 PCR      Comments/MDM:     • Strep negative  Discussed with patient signs and symptoms most likely are due to a viral etiology.     Test for COVID-19.  Result will be automatically released to [employer] application for patient review. I will be sending a message with Next Step Instructions to [employer] soon after resulted.   Symptomatic and supportive care:   Plenty of oral hydration and rest   Over the counter cough suppressant as directed.  Tylenol or ibuprofen for pain and fever as directed.   Warm salt water gargles for sore throat, soft foods, cool liquids.   Saline nasal spray and Flonase as a decongestant.   Infection control measures at home. Stay away from people, Hand washing, covering sneeze/cough, disinfect surfaces.   Remain home from work, school, and other populated environments. Work note provided with information of quarantine  measures per CDC guidelines.   Overall, the patient is well-appearing. They are not hypoxic, afebrile, and a normal pulmonary exam.    Differential diagnosis, natural history, supportive care, and indications for immediate follow-up discussed.     •   •            Please note that this dictation was created using voice recognition software. I have made a reasonable attempt to correct obvious errors, but I expect that there are errors of grammar and possibly content that I did not discover before finalizing the note.    This note was electronically signed by Christian ARMSTRONG.

## 2021-10-13 DIAGNOSIS — E89.0 POSTOPERATIVE HYPOTHYROIDISM: ICD-10-CM

## 2021-10-13 NOTE — TELEPHONE ENCOUNTER
Pt called and she is trying to r/s her missed appt from yesterday, she stated she wasn't aware she had an appt, and we had the incorrect number for her. She is requesting Levothyroxine 0.137 mcg twice a day, she is completely out. She uses [facility] on [address]

## 2021-10-15 ENCOUNTER — TELEPHONE (OUTPATIENT)
Dept: ENDOCRINOLOGY | Facility: MEDICAL CENTER | Age: 51
End: 2021-10-15

## 2021-10-15 RX ORDER — LEVOTHYROXINE SODIUM 137 UG/1
137 TABLET ORAL 2 TIMES DAILY
Qty: 96 TABLET | Refills: 3 | Status: SHIPPED | OUTPATIENT
Start: 2021-10-15 | End: 2022-01-19 | Stop reason: SDUPTHER

## 2021-10-15 NOTE — TELEPHONE ENCOUNTER
Phone Number Called: There are no phone numbers on file.     Call outcome: Phone number is disconnect.    Message: I have tried to call patient, but phone number is not working / disconnect. Medication has been approved.

## 2021-10-15 NOTE — TELEPHONE ENCOUNTER
Pt called regarding rx request for Levothyroxine, she stated she requested it on 10/13/21 and hasn't heard back, she also stated she has called pharmacy and they haven't received it. Please call her [phone].. she is totally out of meds.. she stated she will end up in the hospital if she doesn't receive the meds.. she uses [facility] on [address].. js

## 2021-10-27 ENCOUNTER — PRE-ADMISSION TESTING (OUTPATIENT)
Dept: ADMISSIONS | Facility: MEDICAL CENTER | Age: 51
End: 2021-10-27
Attending: INTERNAL MEDICINE
Payer: MEDICAID

## 2021-10-27 DIAGNOSIS — Z01.812 PRE-OPERATIVE LABORATORY EXAMINATION: ICD-10-CM

## 2021-11-10 ENCOUNTER — HOSPITAL ENCOUNTER (OUTPATIENT)
Facility: MEDICAL CENTER | Age: 51
End: 2021-11-10
Attending: NURSE PRACTITIONER
Payer: MEDICAID

## 2021-11-10 ENCOUNTER — OFFICE VISIT (OUTPATIENT)
Dept: URGENT CARE | Facility: CLINIC | Age: 51
End: 2021-11-10
Payer: MEDICAID

## 2021-11-10 VITALS
RESPIRATION RATE: 16 BRPM | OXYGEN SATURATION: 96 % | HEIGHT: 63 IN | TEMPERATURE: 96.7 F | WEIGHT: 226 LBS | HEART RATE: 75 BPM | BODY MASS INDEX: 40.04 KG/M2 | DIASTOLIC BLOOD PRESSURE: 60 MMHG | SYSTOLIC BLOOD PRESSURE: 120 MMHG

## 2021-11-10 DIAGNOSIS — J02.9 SORE THROAT: ICD-10-CM

## 2021-11-10 DIAGNOSIS — Z20.822 EXPOSURE TO COVID-19 VIRUS: ICD-10-CM

## 2021-11-10 DIAGNOSIS — J02.0 STREP THROAT: ICD-10-CM

## 2021-11-10 DIAGNOSIS — R30.0 DYSURIA: ICD-10-CM

## 2021-11-10 DIAGNOSIS — Z87.19 HISTORY OF GASTROESOPHAGEAL REFLUX (GERD): ICD-10-CM

## 2021-11-10 DIAGNOSIS — J06.9 URI, ACUTE: ICD-10-CM

## 2021-11-10 DIAGNOSIS — Z87.09 HISTORY OF ASTHMA: ICD-10-CM

## 2021-11-10 LAB
APPEARANCE UR: CLEAR
BILIRUB UR STRIP-MCNC: NEGATIVE MG/DL
COLOR UR AUTO: YELLOW
COVID ORDER STATUS COVID19: NORMAL
EXTERNAL QUALITY CONTROL: NORMAL
GLUCOSE UR STRIP.AUTO-MCNC: NEGATIVE MG/DL
INT CON NEG: NEGATIVE
INT CON POS: POSITIVE
KETONES UR STRIP.AUTO-MCNC: NEGATIVE MG/DL
LEUKOCYTE ESTERASE UR QL STRIP.AUTO: NEGATIVE
NITRITE UR QL STRIP.AUTO: NEGATIVE
PH UR STRIP.AUTO: 6 [PH] (ref 5–8)
PROT UR QL STRIP: NEGATIVE MG/DL
RBC UR QL AUTO: NEGATIVE
S PYO AG THROAT QL: POSITIVE
SARS-COV+SARS-COV-2 AG RESP QL IA.RAPID: NEGATIVE
SP GR UR STRIP.AUTO: 1.02
UROBILINOGEN UR STRIP-MCNC: 0.2 MG/DL

## 2021-11-10 PROCEDURE — 87880 STREP A ASSAY W/OPTIC: CPT | Performed by: NURSE PRACTITIONER

## 2021-11-10 PROCEDURE — 81002 URINALYSIS NONAUTO W/O SCOPE: CPT | Performed by: NURSE PRACTITIONER

## 2021-11-10 PROCEDURE — U0005 INFEC AGEN DETEC AMPLI PROBE: HCPCS

## 2021-11-10 PROCEDURE — U0003 INFECTIOUS AGENT DETECTION BY NUCLEIC ACID (DNA OR RNA); SEVERE ACUTE RESPIRATORY SYNDROME CORONAVIRUS 2 (SARS-COV-2) (CORONAVIRUS DISEASE [COVID-19]), AMPLIFIED PROBE TECHNIQUE, MAKING USE OF HIGH THROUGHPUT TECHNOLOGIES AS DESCRIBED BY CMS-2020-01-R: HCPCS

## 2021-11-10 PROCEDURE — 99214 OFFICE O/P EST MOD 30 MIN: CPT | Mod: 25,CS | Performed by: NURSE PRACTITIONER

## 2021-11-10 RX ORDER — ALBUTEROL SULFATE 90 UG/1
2 AEROSOL, METERED RESPIRATORY (INHALATION) EVERY 6 HOURS PRN
Qty: 8.5 G | Refills: 0 | Status: SHIPPED | OUTPATIENT
Start: 2021-11-10

## 2021-11-10 RX ORDER — CEFDINIR 300 MG/1
300 CAPSULE ORAL 2 TIMES DAILY
Qty: 20 CAPSULE | Refills: 0 | Status: SHIPPED | OUTPATIENT
Start: 2021-11-10 | End: 2021-11-20

## 2021-11-10 RX ORDER — PANTOPRAZOLE SODIUM 40 MG/1
40 TABLET, DELAYED RELEASE ORAL DAILY
Qty: 30 TABLET | Refills: 0 | Status: SHIPPED | OUTPATIENT
Start: 2021-11-10 | End: 2021-11-19

## 2021-11-10 ASSESSMENT — ENCOUNTER SYMPTOMS
MYALGIAS: 1
COUGH: 1
SORE THROAT: 1
FEVER: 0
RHINORRHEA: 1
CHILLS: 1

## 2021-11-10 NOTE — PROGRESS NOTES
Subjective     Jayashree Carrasco is a 51 y.o. female who presents with Cough (sore throat, exposure COVID + x 2 days, )    Past Medical History:   Diagnosis Date   • Anemia     in the past   • Anesthesia     severe asthma attack; cannot have propofol   • Arthritis     osteo back   • ASTHMA     inhaler as needed   • Breath shortness     asthma   • Cancer (HCC)     papillary thyroid, removed in , recurrence  treated with radiation   • Chronic low back pain 2014    Takes 1-2 perc 10/ usu only 1-2 times/day Last took perc a week rx from doc in [facility] ( [facility] [place])    • Chronic neck pain 2014   • Fibromyalgia    • Flu-like symptoms 2021   • GERD (gastroesophageal reflux disease)     w/ esophageal dilation X3   • Psychiatric problem     Anxiety   • Thyroid disease    • Unspecified vitamin D deficiency 2014     Social History     Socioeconomic History   • Marital status: Single     Spouse name: Not on file   • Number of children: Not on file   • Years of education: Not on file   • Highest education level: Not on file   Occupational History   • Not on file   Tobacco Use   • Smoking status: Former Smoker     Years: 20.00     Types: Cigarettes     Quit date: 2017     Years since quittin.8   • Smokeless tobacco: Never Used   • Tobacco comment: 1 cigarette week   Vaping Use   • Vaping Use: Never used   Substance and Sexual Activity   • Alcohol use: Not Currently     Alcohol/week: 0.0 oz   • Drug use: No   • Sexual activity: Yes     Partners: Male     Birth control/protection: Surgical   Other Topics Concern   • Not on file   Social History Narrative   • Not on file     Social Determinants of Health     Financial Resource Strain:    • Difficulty of Paying Living Expenses: Not on file   Food Insecurity:    • Worried About Running Out of Food in the Last Year: Not on file   • Ran Out of Food in the Last Year: Not on file   Transportation Needs:    • Lack of Transportation (Medical):  Not on file   • Lack of Transportation (Non-Medical): Not on file   Physical Activity:    • Days of Exercise per Week: Not on file   • Minutes of Exercise per Session: Not on file   Stress:    • Feeling of Stress : Not on file   Social Connections:    • Frequency of Communication with Friends and Family: Not on file   • Frequency of Social Gatherings with Friends and Family: Not on file   • Attends Church Services: Not on file   • Active Member of Clubs or Organizations: Not on file   • Attends Club or Organization Meetings: Not on file   • Marital Status: Not on file   Intimate Partner Violence:    • Fear of Current or Ex-Partner: Not on file   • Emotionally Abused: Not on file   • Physically Abused: Not on file   • Sexually Abused: Not on file   Housing Stability:    • Unable to Pay for Housing in the Last Year: Not on file   • Number of Places Lived in the Last Year: Not on file   • Unstable Housing in the Last Year: Not on file     Family History   Problem Relation Age of Onset   • Stroke Mother 40   • Cancer Mother         leukemia   • Cancer Maternal Aunt 72        breast   • Diabetes Maternal Grandfather    • Cancer Cousin 51        bone       Allergies: Propofol, Ciprofloxacin, Sulfa drugs, and Seasonal    This patient is a 51-year-old female who presents today with complaint of cough, fatigue and malaise, and nasal congestion.  Has been exposed to Covid.  Also complains of dysuria, urgency, and frequency.          Cough  This is a new problem. The current episode started in the past 7 days. The problem has been unchanged. The problem occurs every few minutes. The cough is non-productive. Associated symptoms include chills, myalgias, nasal congestion, rhinorrhea and a sore throat. Pertinent negatives include no fever. Nothing aggravates the symptoms. She has tried nothing for the symptoms. The treatment provided no relief.       Review of Systems   Constitutional: Positive for chills and malaise/fatigue.  "Negative for fever.   HENT: Positive for rhinorrhea and sore throat.    Respiratory: Positive for cough.    Musculoskeletal: Positive for myalgias.   All other systems reviewed and are negative.             Objective     /60   Pulse 75   Temp 35.9 °C (96.7 °F)   Resp 16   Ht 1.6 m (5' 3\")   Wt 103 kg (226 lb)   LMP 11/21/2015   SpO2 96%   BMI 40.03 kg/m²      Physical Exam  Vitals reviewed.   Constitutional:       Appearance: Normal appearance.   HENT:      Head: Normocephalic and atraumatic.      Right Ear: Tympanic membrane, ear canal and external ear normal.      Left Ear: Tympanic membrane, ear canal and external ear normal.      Nose: Nose normal.      Mouth/Throat:      Mouth: Mucous membranes are moist.   Eyes:      Extraocular Movements: Extraocular movements intact.      Conjunctiva/sclera: Conjunctivae normal.      Pupils: Pupils are equal, round, and reactive to light.   Cardiovascular:      Rate and Rhythm: Normal rate and regular rhythm.      Heart sounds: Normal heart sounds.   Pulmonary:      Effort: Pulmonary effort is normal.      Breath sounds: Normal breath sounds.   Musculoskeletal:         General: Normal range of motion.      Cervical back: Normal range of motion and neck supple.   Skin:     General: Skin is warm and dry.   Neurological:      Mental Status: She is alert and oriented to person, place, and time.   Psychiatric:         Mood and Affect: Mood normal.         Behavior: Behavior normal.          poct covid: negative     poct strep: positive       UA: Negative blood, negative leukocytes, negative nitrites             Assessment & Plan   URI  Strep throat  Exposure to covid  History of gerd  histosry of asthma  Cystitis    Refilled albuterol  Refilled Protonix  Omnicef  Covid nasal swab obtained  Self quarantine until results received  Patient [facility]  Tylenol/Motrin as needed, over-the-counter cough/cold medication of choice as " needed  Strict ER precautions for respiratory distress  Written instructions given for self-care quarantine at home  Return to urgent care otherwise for any further questions or concerns          There are no diagnoses linked to this encounter.

## 2021-11-11 LAB
SARS-COV-2 RNA RESP QL NAA+PROBE: NOTDETECTED
SPECIMEN SOURCE: NORMAL

## 2021-11-19 ENCOUNTER — HOSPITAL ENCOUNTER (OUTPATIENT)
Facility: MEDICAL CENTER | Age: 51
End: 2021-11-19
Attending: NURSE PRACTITIONER
Payer: MEDICAID

## 2021-11-19 ENCOUNTER — OFFICE VISIT (OUTPATIENT)
Dept: URGENT CARE | Facility: CLINIC | Age: 51
End: 2021-11-19
Payer: MEDICAID

## 2021-11-19 VITALS
HEART RATE: 85 BPM | SYSTOLIC BLOOD PRESSURE: 118 MMHG | TEMPERATURE: 97.8 F | DIASTOLIC BLOOD PRESSURE: 82 MMHG | RESPIRATION RATE: 24 BRPM | OXYGEN SATURATION: 96 % | BODY MASS INDEX: 41.04 KG/M2 | HEIGHT: 62 IN | WEIGHT: 223 LBS

## 2021-11-19 DIAGNOSIS — J06.9 VIRAL UPPER RESPIRATORY TRACT INFECTION WITH COUGH: ICD-10-CM

## 2021-11-19 DIAGNOSIS — R11.0 NAUSEA: ICD-10-CM

## 2021-11-19 PROCEDURE — U0003 INFECTIOUS AGENT DETECTION BY NUCLEIC ACID (DNA OR RNA); SEVERE ACUTE RESPIRATORY SYNDROME CORONAVIRUS 2 (SARS-COV-2) (CORONAVIRUS DISEASE [COVID-19]), AMPLIFIED PROBE TECHNIQUE, MAKING USE OF HIGH THROUGHPUT TECHNOLOGIES AS DESCRIBED BY CMS-2020-01-R: HCPCS

## 2021-11-19 PROCEDURE — 99214 OFFICE O/P EST MOD 30 MIN: CPT | Performed by: NURSE PRACTITIONER

## 2021-11-19 PROCEDURE — U0005 INFEC AGEN DETEC AMPLI PROBE: HCPCS

## 2021-11-19 RX ORDER — BENZONATATE 100 MG/1
100 CAPSULE ORAL 3 TIMES DAILY PRN
Qty: 60 CAPSULE | Refills: 0 | Status: SHIPPED | OUTPATIENT
Start: 2021-11-19 | End: 2022-02-03

## 2021-11-19 RX ORDER — SUCRALFATE 1 G/1
TABLET ORAL
COMMUNITY
Start: 2021-10-07

## 2021-11-19 RX ORDER — PROMETHAZINE HYDROCHLORIDE 25 MG/1
25 TABLET ORAL EVERY 8 HOURS PRN
Qty: 10 TABLET | Refills: 0 | Status: SHIPPED | OUTPATIENT
Start: 2021-11-19 | End: 2022-02-03

## 2021-11-19 RX ORDER — LIDOCAINE 50 MG/G
OINTMENT TOPICAL
COMMUNITY
Start: 2021-10-05

## 2021-11-19 RX ORDER — ALBUTEROL SULFATE 2.5 MG/3ML
SOLUTION RESPIRATORY (INHALATION)
COMMUNITY
Start: 2021-08-27 | End: 2022-02-03

## 2021-11-20 DIAGNOSIS — J06.9 VIRAL UPPER RESPIRATORY TRACT INFECTION WITH COUGH: ICD-10-CM

## 2021-11-20 LAB — COVID ORDER STATUS COVID19: NORMAL

## 2021-11-20 NOTE — PROGRESS NOTES
Chief Complaint   Patient presents with   • Coronavirus Screening     exposed 2 weeks ago, sxs x 1 week       HISTORY OF PRESENT ILLNESS: Patient is a pleasant 51 y.o. female who presents today due to symptoms which started five days ago. Pt reports a dry cough, rhinitis, nausea, fever, chills, shortness of breath, wheezing, fatigue, malaise, and body aches.  Patient does have a history of asthma, has been using her inhaler with improvement.  Furthermore, the patient was recently diagnosed with strep pharyngitis, is currently taking cefdinir, completing the medication tomorrow.  During that visit, she had a Covid test performed which is negative.  She has not been vaccinated.  Has tried OTC cold medications without significant relief of symptoms. No other aggravating or alleviating factors. Reports positive exposure to COVID-19.       Patient Active Problem List    Diagnosis Date Noted   • Hx of thyroid cancer 04/08/2021   • Other chest pain 09/23/2020   • Viral syndrome 09/23/2020   • Lipoma 06/24/2020   • Axillary mass 06/02/2020   • Seborrheic keratosis 06/02/2020   • Stricture of esophagus 04/13/2020   • Hypothyroidism, postsurgical 04/15/2019   • Infected tooth 03/29/2019   • Acute otitis media 01/24/2019   • Urinary frequency 01/24/2019   • Vaginal candidiasis 12/18/2018   • Nausea vomiting and diarrhea 10/18/2018   • Schatzki's ring 10/18/2018   • Abnormal brain MRI 09/27/2018   • Plantar fasciitis, right 04/12/2018   • Seasonal allergies 04/12/2018   • Chronic UTI 12/19/2017   • Asthma 06/13/2017   • Degeneration of intervertebral disc 06/13/2017   • Morbid obesity (HCC) 06/13/2017   • Mild intermittent asthma without complication 05/30/2017   • History of papillary adenocarcinoma of thyroid 05/30/2017   • GERD (gastroesophageal reflux disease) 05/30/2017   • Obesity (BMI 35.0-39.9 without comorbidity) 05/30/2017   • Abnormal drug screen 05/02/2017   • Chronic low back pain 04/12/2014   • Chronic neck pain  04/12/2014   • Vitamin D deficiency 04/12/2014   • Hypothyroidism 06/07/2012   • Fibromyalgia 06/07/2012       Allergies:Propofol, Ciprofloxacin, Sulfa drugs, and Seasonal    Current Outpatient Medications Ordered in Epic   Medication Sig Dispense Refill   • lidocaine (XYLOCAINE) 5 % Ointment      • sucralfate (CARAFATE) 1 GM Tab      • benzonatate (TESSALON) 100 MG Cap Take 1 Capsule by mouth 3 times a day as needed for Cough. 60 Capsule 0   • promethazine (PHENERGAN) 25 MG Tab Take 1 Tablet by mouth every 8 hours as needed for Nausea/Vomiting. 10 Tablet 0   • levothyroxine (SYNTHROID) 137 MCG Tab Take 1 Tablet by mouth 2 times a day. 96 Tablet 3   • cetirizine (ZYRTEC) 10 MG Tab Take 10 mg by mouth every day.     • vitamin D, Ergocalciferol, (DRISDOL) 1.25 MG (36713 UT) Cap capsule Take  by mouth every 7 days.     • fluticasone (FLONASE) 50 MCG/ACT nasal spray Administer 1 Spray into affected nostril(S) every day. 16 g 0   • albuterol 108 (90 Base) MCG/ACT Aero Soln inhalation aerosol Inhale 2 Puffs by mouth every four hours as needed for Shortness of Breath.     • oxyCODONE-acetaminophen (PERCOCET) 7.5-325 MG per tablet Take 1 Tab by mouth every 8 hours as needed. Indications: Pain     • nortriptyline (PAMELOR) 10 MG Cap Take 10 mg by mouth every bedtime.  0   • pantoprazole (PROTONIX) 40 MG Tablet Delayed Response Take 1 Tab by mouth 2 times a day. 60 Tab 0   • albuterol (PROVENTIL) 2.5mg/3ml Nebu Soln solution for nebulization USE 3 ML VIA NEBULIZER EVERY 4 TO 6 HOURS AS NEEDED FOR SHORTNESS OF BREATH OR COUGH (Patient not taking: Reported on 11/19/2021)     • albuterol 108 (90 Base) MCG/ACT Aero Soln inhalation aerosol Inhale 2 Puffs every 6 hours as needed for Shortness of Breath. (Patient not taking: Reported on 11/19/2021) 8.5 g 0   • cefdinir (OMNICEF) 300 MG Cap Take 1 Capsule by mouth 2 times a day for 10 days. (Patient not taking: Reported on 11/19/2021) 20 Capsule 0   • Lidocaine 4 % Ointment LIDOCAINE  4 % OINT (Patient not taking: Reported on 2021)     • NON SPECIFIED amberen - menapause supplement       No current Epic-ordered facility-administered medications on file.       Past Medical History:   Diagnosis Date   • Anemia     in the past   • Anesthesia     severe asthma attack; cannot have propofol   • Arthritis     osteo back   • ASTHMA     inhaler as needed   • Breath shortness     asthma   • Cancer (HCC)     papillary thyroid, removed in , recurrence  treated with radiation   • Chronic low back pain 2014    Takes 1-2 perc 10/325 usu only 1-2 times/day Last took perc a week rx from doc in [facility] ( [facility] [place])    • Chronic neck pain 2014   • Fibromyalgia    • Flu-like symptoms 2021   • GERD (gastroesophageal reflux disease)     w/ esophageal dilation X3   • Psychiatric problem     Anxiety   • Thyroid disease    • Unspecified vitamin D deficiency 2014       Social History     Tobacco Use   • Smoking status: Former Smoker     Years: 20.00     Types: Cigarettes     Quit date: 2017     Years since quittin.8   • Smokeless tobacco: Never Used   • Tobacco comment: 1 cigarette week   Vaping Use   • Vaping Use: Never used   Substance Use Topics   • Alcohol use: Not Currently     Alcohol/week: 0.0 oz   • Drug use: No       Family Status   Relation Name Status   • Mo     • Fa  Other   • MAunt  (Not Specified)   • MGFa  (Not Specified)   • Cou  (Not Specified)     Family History   Problem Relation Age of Onset   • Stroke Mother 40   • Cancer Mother         leukemia   • Cancer Maternal Aunt 72        breast   • Diabetes Maternal Grandfather    • Cancer Cousin 51        bone       ROS:  Review of Systems   Constitutional: Positive for subjective fever, chills, fatigue, malaise. Negative for weight loss.  HENT: Positive for rhinitis, sore throat. Negative for ear pain, nosebleeds, and neck pain.    Eyes: Negative for vision changes.   Cardiovascular: Negative for chest  "pain, palpitations, orthopnea and leg swelling.   Respiratory: Positive for cough, shortness of breath and wheezing.   Gastrointestinal: Positive for nausea. Negative for abdominal pain, vomiting or diarrhea.   Skin: Negative for rash, diaphoresis.     Exam:  /82 (BP Location: Left arm, Patient Position: Sitting, BP Cuff Size: Large adult long)   Pulse 85   Temp 36.6 °C (97.8 °F) (Temporal)   Resp (!) 24   Ht 1.575 m (5' 2\")   Wt 101 kg (223 lb)   SpO2 96%   General: well-nourished, well-developed female in NAD  Head: normocephalic, atraumatic  Eyes: PERRLA, EOM within normal limits, no conjunctival injection, no scleral icterus, visual fields and acuity grossly intact.  Ears: normal shape and symmetry, no tenderness, no discharge. External canals are without any significant edema or erythema. Tympanic membranes are without any inflammation, no effusion. Gross auditory acuity is intact.  Nose: symmetrical without tenderness, mild discharge, erythema present bilateral nares.  Mouth/Throat: reasonable hygiene, no exudates or tonsillar enlargement. Mild erythema present.   Neck: no masses, range of motion within normal limits, no tracheal deviation.  Lymph: mild cervical adenopathy. No supraclavicular adenopathy.   Neuro: alert and oriented. Cranial nerves 1-12 grossly intact.   Cardiovascular: regular rate and rhythm without murmurs, rubs, or gallops. No edema.   Pulmonary: no distress. Chest is symmetrical with respiration. No wheezes, crackles, or rhonchi.   Musculoskeletal: appropriate muscle tone, gait is stable.  GI: soft, non-tender, no guarding, no hepatosplenomegaly.   Skin: warm, dry, intact, no clubbing, no cyanosis.   Psych: appropriate mood, affect, judgement.         Assessment/Plan:  1. Viral upper respiratory tract infection with cough  benzonatate (TESSALON) 100 MG Cap    COVID/SARS CoV-2 PCR   2. Nausea  promethazine (PHENERGAN) 25 MG Tab           Discussed symptoms most likely viral, " will test for COVID. Home quarantine advised. Discussed natural progression and sx care.  Tessalon and Phenergan as needed.  Continue cefdinir as directed.  Rest, increase fluids, hand and respiratory hygiene. May take OTC medications as directed for symptom relief. STRICT ER precautions.   Supportive care, differential diagnoses, and indications for immediate follow-up discussed with patient.   Pathogenesis of diagnosis discussed including typical length and natural progression.  Instructed to return to clinic or nearest emergency department for any change in condition, further concerns, or worsening of symptoms.  Patient states understanding of the plan of care and discharge instructions.  Previous clinic visit encounter reviewed and considered in medical decision making today.         ABDIEL Carroll.

## 2021-11-21 LAB
SARS-COV-2 RNA RESP QL NAA+PROBE: DETECTED
SPECIMEN SOURCE: ABNORMAL

## 2022-01-12 ENCOUNTER — PRE-ADMISSION TESTING (OUTPATIENT)
Dept: ADMISSIONS | Facility: MEDICAL CENTER | Age: 52
End: 2022-01-12
Attending: INTERNAL MEDICINE
Payer: MEDICAID

## 2022-01-19 ENCOUNTER — TELEMEDICINE (OUTPATIENT)
Dept: ENDOCRINOLOGY | Facility: MEDICAL CENTER | Age: 52
End: 2022-01-19
Attending: NURSE PRACTITIONER
Payer: MEDICAID

## 2022-01-19 DIAGNOSIS — E83.51 HYPOCALCEMIA: ICD-10-CM

## 2022-01-19 DIAGNOSIS — E55.9 VITAMIN D DEFICIENCY: ICD-10-CM

## 2022-01-19 DIAGNOSIS — E89.0 POSTOPERATIVE HYPOTHYROIDISM: ICD-10-CM

## 2022-01-19 DIAGNOSIS — Z85.850 HISTORY OF PAPILLARY ADENOCARCINOMA OF THYROID: ICD-10-CM

## 2022-01-19 PROCEDURE — 99214 OFFICE O/P EST MOD 30 MIN: CPT | Performed by: NURSE PRACTITIONER

## 2022-01-19 RX ORDER — ERGOCALCIFEROL 1.25 MG/1
50000 CAPSULE ORAL
Qty: 12 CAPSULE | Refills: 3 | Status: SHIPPED | OUTPATIENT
Start: 2022-01-19

## 2022-01-19 RX ORDER — LEVOTHYROXINE SODIUM 137 UG/1
137 TABLET ORAL 2 TIMES DAILY
Qty: 96 TABLET | Refills: 3 | Status: SHIPPED | OUTPATIENT
Start: 2022-01-19 | End: 2022-05-03

## 2022-01-19 NOTE — PROGRESS NOTES
Endocrinology Clinic Progress Note  PCP: NONE  Chief complaint: Follow-up postoperative hypothyroidism.  Previous patient of KYLIE Hill last appointment 04/15/2019 and Previously followed by Dr. Storm 2017.  Patient was presented for a telehealth consultation via secure and encrypted videoconferencing technology. This encounter was conducted via Zoom . Verbal consent was obtained. Patient's identity was verified.    ______________________________________________________________    HPI:  Jayashree Carrasco is a 51 y.o. old patient for continued evaluation & treatment of the followin. History of papillary adenocarcinoma of thyroid  Papillary thyroid carcinoma (CMS-HCC)  · Status post total thyroidectomy in , and radioactive iodine ablation in  and   · Diagnostic whole body scan in  was negative  NO INCREASED ACTIVITY WITHIN THE THYROID BED.   NO REMOTE FOCI OF   INCREASED ACTIVITY SUSPICIOUS FOR METASTATIC DISEASE.         Thyroid ultrasound: 2018   Thyroid bed appears normal.  There is no adenopathy or mass. There is no residual tissue.    Patient denies symptoms of sensation of a throat mass, voice changes, hoarseness, neck pain, or difficulty swallowing, dysphonia, enlarged cervical lymph nodes, sweating, tremors,       2. Vitamin D deficiency  Vitamin D 23630 IU weekly   No current vitamin D supplementation.    3. Hypothyroidism, postsurgical  Synthroid 137 mcg 2 tablets daily same dose for over 5 years.   Patient takes 1 in the am and 1 in the pm   On empty stomach 1 hour prior to eating.  Patient denies any recent changes to her dosing.  Patient denies taking calcium, antacids and/or iron supplementation.  Patient denies cold intolerance, constipation and mental fogginess.    No current thyroid function to evaluate:  No current thyroglobulin level.     Ref. Range 8/15/2018 10:05   TSH Latest Ref Range: 0.380 - 5.330 uIU/mL 2.740   Free T-4 Latest Ref Range: 0.53 - 1.43 ng/dL  0.89       4.  Incidental hypocalcemia during hospitalization 3/30/2019:  3/30/2019 calcium 7.7, albumin 3.3    Over the counter menopause supplements to reduce hot flashes    ROS:  Constitutional: No weight loss,  fever  HEENT: No difficulty with swallowing, change in voice, or swelling in throat area   Cardiac: Hx of cp 3/2019- hospitalized at [facility]  Resp: No shortness of breath  GI: No abdominal pain, nausea, vomiting, or diarrhea   Neuro: No numbness or tinging in feet  Endo: No heat or cold intolerance, no polyuria or polydipsia  All other detailed ROS is otherwise negative       Past Medical History:  Patient Active Problem List    Diagnosis Date Noted   • Hx of thyroid cancer 04/08/2021   • Other chest pain 09/23/2020   • Viral syndrome 09/23/2020   • Lipoma 06/24/2020   • Axillary mass 06/02/2020   • Seborrheic keratosis 06/02/2020   • Stricture of esophagus 04/13/2020   • Hypothyroidism, postsurgical 04/15/2019   • Infected tooth 03/29/2019   • Acute otitis media 01/24/2019   • Urinary frequency 01/24/2019   • Vaginal candidiasis 12/18/2018   • Nausea vomiting and diarrhea 10/18/2018   • Schatzki's ring 10/18/2018   • Abnormal brain MRI 09/27/2018   • Plantar fasciitis, right 04/12/2018   • Seasonal allergies 04/12/2018   • Chronic UTI 12/19/2017   • Asthma 06/13/2017   • Degeneration of intervertebral disc 06/13/2017   • Morbid obesity (HCC) 06/13/2017   • Mild intermittent asthma without complication 05/30/2017   • History of papillary adenocarcinoma of thyroid 05/30/2017   • GERD (gastroesophageal reflux disease) 05/30/2017   • Obesity (BMI 35.0-39.9 without comorbidity) 05/30/2017   • Abnormal drug screen 05/02/2017   • Chronic low back pain 04/12/2014   • Chronic neck pain 04/12/2014   • Vitamin D deficiency 04/12/2014   • Hypothyroidism 06/07/2012   • Fibromyalgia 06/07/2012       Family Medical History:   Family History   Problem Relation Age of Onset   • Stroke Mother 40   • Cancer Mother          leukemia   • Cancer Maternal Aunt 72        breast   • Diabetes Maternal Grandfather    • Cancer Cousin 51        bone       Social History:   Social History     Socioeconomic History   • Marital status:      Spouse name: Not on file   • Number of children: Not on file   • Years of education: Not on file   • Highest education level: Not on file   Occupational History   • Not on file   Tobacco Use   • Smoking status: Former Smoker     Years: 20.00     Types: Cigarettes     Quit date: 2017     Years since quittin.0   • Smokeless tobacco: Never Used   • Tobacco comment: 1 cigarette week   Vaping Use   • Vaping Use: Never used   Substance and Sexual Activity   • Alcohol use: Not Currently     Alcohol/week: 0.0 oz   • Drug use: No   • Sexual activity: Yes     Partners: Male     Birth control/protection: Surgical   Other Topics Concern   • Not on file   Social History Narrative   • Not on file     Social Determinants of Health     Financial Resource Strain:    • Difficulty of Paying Living Expenses: Not on file   Food Insecurity:    • Worried About Running Out of Food in the Last Year: Not on file   • Ran Out of Food in the Last Year: Not on file   Transportation Needs:    • Lack of Transportation (Medical): Not on file   • Lack of Transportation (Non-Medical): Not on file   Physical Activity:    • Days of Exercise per Week: Not on file   • Minutes of Exercise per Session: Not on file   Stress:    • Feeling of Stress : Not on file   Social Connections:    • Frequency of Communication with Friends and Family: Not on file   • Frequency of Social Gatherings with Friends and Family: Not on file   • Attends Faith Services: Not on file   • Active Member of Clubs or Organizations: Not on file   • Attends Club or Organization Meetings: Not on file   • Marital Status: Not on file   Intimate Partner Violence:    • Fear of Current or Ex-Partner: Not on file   • Emotionally Abused: Not on file   • Physically  Abused: Not on file   • Sexually Abused: Not on file   Housing Stability:    • Unable to Pay for Housing in the Last Year: Not on file   • Number of Places Lived in the Last Year: Not on file   • Unstable Housing in the Last Year: Not on file       Medications:    Current Outpatient Medications:   •  lidocaine (XYLOCAINE) 5 % Ointment, , Disp: , Rfl:   •  sucralfate (CARAFATE) 1 GM Tab, , Disp: , Rfl:   •  albuterol (PROVENTIL) 2.5mg/3ml Nebu Soln solution for nebulization, USE 3 ML VIA NEBULIZER EVERY 4 TO 6 HOURS AS NEEDED FOR SHORTNESS OF BREATH OR COUGH (Patient not taking: Reported on 11/19/2021), Disp: , Rfl:   •  benzonatate (TESSALON) 100 MG Cap, Take 1 Capsule by mouth 3 times a day as needed for Cough., Disp: 60 Capsule, Rfl: 0  •  promethazine (PHENERGAN) 25 MG Tab, Take 1 Tablet by mouth every 8 hours as needed for Nausea/Vomiting., Disp: 10 Tablet, Rfl: 0  •  albuterol 108 (90 Base) MCG/ACT Aero Soln inhalation aerosol, Inhale 2 Puffs every 6 hours as needed for Shortness of Breath. (Patient not taking: Reported on 11/19/2021), Disp: 8.5 g, Rfl: 0  •  levothyroxine (SYNTHROID) 137 MCG Tab, Take 1 Tablet by mouth 2 times a day., Disp: 96 Tablet, Rfl: 3  •  Lidocaine 4 % Ointment, LIDOCAINE 4 % OINT (Patient not taking: Reported on 11/19/2021), Disp: , Rfl:   •  cetirizine (ZYRTEC) 10 MG Tab, Take 10 mg by mouth every day., Disp: , Rfl:   •  vitamin D, Ergocalciferol, (DRISDOL) 1.25 MG (19675 UT) Cap capsule, Take  by mouth every 7 days., Disp: , Rfl:   •  NON SPECIFIED, amberen - menapause supplement, Disp: , Rfl:   •  fluticasone (FLONASE) 50 MCG/ACT nasal spray, Administer 1 Spray into affected nostril(S) every day., Disp: 16 g, Rfl: 0  •  albuterol 108 (90 Base) MCG/ACT Aero Soln inhalation aerosol, Inhale 2 Puffs by mouth every four hours as needed for Shortness of Breath., Disp: , Rfl:   •  oxyCODONE-acetaminophen (PERCOCET) 7.5-325 MG per tablet, Take 1 Tab by mouth every 8 hours as needed.  Indications: Pain, Disp: , Rfl:   •  nortriptyline (PAMELOR) 10 MG Cap, Take 10 mg by mouth every bedtime., Disp: , Rfl: 0  •  pantoprazole (PROTONIX) 40 MG Tablet Delayed Response, Take 1 Tab by mouth 2 times a day., Disp: 60 Tab, Rfl: 0    Labs: Reviewed as above     Physical Examination:  Vital signs: Virtual appointment.  General: No apparent distress, cooperative  Eyes: No scleral icterus, no discharge, normal eyelids,   Neck: No abnormal masses on inspection, healed thyroid scar negative lymphadenopathy  Resp: Normal effort, clear to auscultation bilaterally  CVS: Regular rate and rhythm, S1 S2 normal, no murmur  Extremities: No lower extremity edema  Abdomen: abdominal obesity present  Musculoskeletal: Normal digits and nails  Skin: No rash on visible skin positive skin tags positive actinic keratosis left neck  Psych: Alert and oriented, normal mood and affect, intact memory and able to make informed decisions.    Assessment and Plan:  1. History of papillary adenocarcinoma of thyroid  Patient has not had any recent follow up will recheck labs for thyroid cancer markers     - THYROGLOBULIN, QT; Future  - ANTITHYROGLOBULIN AB; Future  - US-SOFT TISSUES OF HEAD - NECK; Future    2. Vitamin D deficiency  Unstable.  Recommend vitamin D 50,000 IU weekly.      3. Hypothyroidism, postsurgical  Unstable.  Continue taking Synthroid 137 mcg BID     Future lab orders:  - TSH; Future  - TRIIDOTHYRONINE; Future  - FREE THYROXINE; Future  - T3 FREE; Future  - Comp Metabolic Panel; Future      4. Hypocalcemia  Evaluation secondary to incidental hypocalcemia during hospitalization. Discussed vitamin d effects on calcium. Patient was recently started on vitamin d replacement   - PTH WITH CALCIUM; Future  - ALBUMIN; Future  - IONIZED CALCIUM; Future  - PHOSPHORUS; Future    Complete labs now and again in 3 months.  Next appointment in 3 months.    Thank you for allowing me to participate in the care of this patient.  If you  have any questions or concerns please do not hesitate to contact me.    Dorothy Siegel, [email or web address]   01/19/2022    CC:   Shelby Bell M.D.

## 2022-01-25 RX ORDER — ONDANSETRON 4 MG/1
TABLET, ORALLY DISINTEGRATING ORAL
COMMUNITY
Start: 2021-11-21 | End: 2022-02-03

## 2022-01-25 RX ORDER — KETOROLAC TROMETHAMINE 30 MG/ML
INJECTION, SOLUTION INTRAMUSCULAR; INTRAVENOUS
COMMUNITY
Start: 2021-11-21 | End: 2022-02-03

## 2022-01-25 RX ORDER — CHOLECALCIFEROL (VITAMIN D3) 1250 MCG
1 CAPSULE ORAL
COMMUNITY
Start: 2021-12-18

## 2022-01-25 RX ORDER — ONDANSETRON 8 MG/1
TABLET, ORALLY DISINTEGRATING ORAL
COMMUNITY
Start: 2021-11-22 | End: 2022-02-03

## 2022-01-25 RX ORDER — METRONIDAZOLE 7.5 MG/G
GEL VAGINAL
COMMUNITY
Start: 2022-01-12

## 2022-01-25 RX ORDER — IBUPROFEN 800 MG/1
TABLET ORAL
Status: ON HOLD | COMMUNITY
Start: 2021-11-22 | End: 2022-01-28

## 2022-01-25 RX ORDER — FLUCONAZOLE 150 MG/1
TABLET ORAL
COMMUNITY
Start: 2022-01-12 | End: 2022-02-03

## 2022-01-25 RX ORDER — PSEUDOEPHED/ACETAMINOPH/DIPHEN 30MG-500MG
TABLET ORAL
COMMUNITY
Start: 2021-11-21

## 2022-01-28 ENCOUNTER — HOSPITAL ENCOUNTER (OUTPATIENT)
Facility: MEDICAL CENTER | Age: 52
End: 2022-01-28
Attending: INTERNAL MEDICINE | Admitting: INTERNAL MEDICINE
Payer: MEDICAID

## 2022-01-28 ENCOUNTER — ANESTHESIA (OUTPATIENT)
Dept: SURGERY | Facility: MEDICAL CENTER | Age: 52
End: 2022-01-28
Payer: MEDICAID

## 2022-01-28 ENCOUNTER — ANESTHESIA EVENT (OUTPATIENT)
Dept: SURGERY | Facility: MEDICAL CENTER | Age: 52
End: 2022-01-28
Payer: MEDICAID

## 2022-01-28 VITALS
BODY MASS INDEX: 39.45 KG/M2 | HEIGHT: 63 IN | HEART RATE: 78 BPM | TEMPERATURE: 97.3 F | DIASTOLIC BLOOD PRESSURE: 73 MMHG | WEIGHT: 222.66 LBS | SYSTOLIC BLOOD PRESSURE: 111 MMHG | OXYGEN SATURATION: 90 % | RESPIRATION RATE: 15 BRPM

## 2022-01-28 DIAGNOSIS — K20.0 EOSINOPHILIC ESOPHAGITIS: Chronic | ICD-10-CM

## 2022-01-28 DIAGNOSIS — K22.2 STRICTURE OF ESOPHAGUS: ICD-10-CM

## 2022-01-28 LAB — PATHOLOGY CONSULT NOTE: NORMAL

## 2022-01-28 PROCEDURE — 160002 HCHG RECOVERY MINUTES (STAT): Performed by: INTERNAL MEDICINE

## 2022-01-28 PROCEDURE — C1726 CATH, BAL DIL, NON-VASCULAR: HCPCS | Performed by: INTERNAL MEDICINE

## 2022-01-28 PROCEDURE — 160046 HCHG PACU - 1ST 60 MINS PHASE II: Performed by: INTERNAL MEDICINE

## 2022-01-28 PROCEDURE — 700105 HCHG RX REV CODE 258: Performed by: INTERNAL MEDICINE

## 2022-01-28 PROCEDURE — 700101 HCHG RX REV CODE 250: Performed by: INTERNAL MEDICINE

## 2022-01-28 PROCEDURE — 160203 HCHG ENDO MINUTES - 1ST 30 MINS LEVEL 4: Performed by: INTERNAL MEDICINE

## 2022-01-28 PROCEDURE — 160048 HCHG OR STATISTICAL LEVEL 1-5: Performed by: INTERNAL MEDICINE

## 2022-01-28 PROCEDURE — 88312 SPECIAL STAINS GROUP 1: CPT

## 2022-01-28 PROCEDURE — 700111 HCHG RX REV CODE 636 W/ 250 OVERRIDE (IP): Performed by: INTERNAL MEDICINE

## 2022-01-28 PROCEDURE — 160009 HCHG ANES TIME/MIN: Performed by: INTERNAL MEDICINE

## 2022-01-28 PROCEDURE — 160025 RECOVERY II MINUTES (STATS): Performed by: INTERNAL MEDICINE

## 2022-01-28 PROCEDURE — 160035 HCHG PACU - 1ST 60 MINS PHASE I: Performed by: INTERNAL MEDICINE

## 2022-01-28 PROCEDURE — 88305 TISSUE EXAM BY PATHOLOGIST: CPT | Mod: 59

## 2022-01-28 RX ORDER — HYDROMORPHONE HYDROCHLORIDE 1 MG/ML
0.4 INJECTION, SOLUTION INTRAMUSCULAR; INTRAVENOUS; SUBCUTANEOUS
Status: DISCONTINUED | OUTPATIENT
Start: 2022-01-28 | End: 2022-01-28 | Stop reason: HOSPADM

## 2022-01-28 RX ORDER — ETOMIDATE 2 MG/ML
INJECTION INTRAVENOUS PRN
Status: DISCONTINUED | OUTPATIENT
Start: 2022-01-28 | End: 2022-01-28 | Stop reason: SURG

## 2022-01-28 RX ORDER — LIDOCAINE HYDROCHLORIDE 40 MG/ML
SOLUTION TOPICAL PRN
Status: DISCONTINUED | OUTPATIENT
Start: 2022-01-28 | End: 2022-01-28 | Stop reason: SURG

## 2022-01-28 RX ORDER — DEXAMETHASONE SODIUM PHOSPHATE 4 MG/ML
INJECTION, SOLUTION INTRA-ARTICULAR; INTRALESIONAL; INTRAMUSCULAR; INTRAVENOUS; SOFT TISSUE PRN
Status: DISCONTINUED | OUTPATIENT
Start: 2022-01-28 | End: 2022-01-28 | Stop reason: SURG

## 2022-01-28 RX ORDER — OXYCODONE HCL 5 MG/5 ML
10 SOLUTION, ORAL ORAL
Status: DISCONTINUED | OUTPATIENT
Start: 2022-01-28 | End: 2022-01-28 | Stop reason: HOSPADM

## 2022-01-28 RX ORDER — OMEPRAZOLE 40 MG/1
40 CAPSULE, DELAYED RELEASE ORAL 2 TIMES DAILY
Qty: 180 CAPSULE | Refills: 3 | Status: SHIPPED | OUTPATIENT
Start: 2022-01-28

## 2022-01-28 RX ORDER — HYDROMORPHONE HYDROCHLORIDE 1 MG/ML
0.1 INJECTION, SOLUTION INTRAMUSCULAR; INTRAVENOUS; SUBCUTANEOUS
Status: DISCONTINUED | OUTPATIENT
Start: 2022-01-28 | End: 2022-01-28 | Stop reason: HOSPADM

## 2022-01-28 RX ORDER — LABETALOL HYDROCHLORIDE 5 MG/ML
5 INJECTION, SOLUTION INTRAVENOUS
Status: DISCONTINUED | OUTPATIENT
Start: 2022-01-28 | End: 2022-01-28 | Stop reason: HOSPADM

## 2022-01-28 RX ORDER — HYDRALAZINE HYDROCHLORIDE 20 MG/ML
5 INJECTION INTRAMUSCULAR; INTRAVENOUS
Status: DISCONTINUED | OUTPATIENT
Start: 2022-01-28 | End: 2022-01-28 | Stop reason: HOSPADM

## 2022-01-28 RX ORDER — ONDANSETRON 2 MG/ML
4 INJECTION INTRAMUSCULAR; INTRAVENOUS
Status: DISCONTINUED | OUTPATIENT
Start: 2022-01-28 | End: 2022-01-28 | Stop reason: HOSPADM

## 2022-01-28 RX ORDER — OXYCODONE HCL 5 MG/5 ML
5 SOLUTION, ORAL ORAL
Status: DISCONTINUED | OUTPATIENT
Start: 2022-01-28 | End: 2022-01-28 | Stop reason: HOSPADM

## 2022-01-28 RX ORDER — DIPHENHYDRAMINE HYDROCHLORIDE 50 MG/ML
12.5 INJECTION INTRAMUSCULAR; INTRAVENOUS
Status: DISCONTINUED | OUTPATIENT
Start: 2022-01-28 | End: 2022-01-28 | Stop reason: HOSPADM

## 2022-01-28 RX ORDER — HALOPERIDOL 5 MG/ML
1 INJECTION INTRAMUSCULAR
Status: DISCONTINUED | OUTPATIENT
Start: 2022-01-28 | End: 2022-01-28 | Stop reason: HOSPADM

## 2022-01-28 RX ORDER — ONDANSETRON 2 MG/ML
INJECTION INTRAMUSCULAR; INTRAVENOUS PRN
Status: DISCONTINUED | OUTPATIENT
Start: 2022-01-28 | End: 2022-01-28 | Stop reason: SURG

## 2022-01-28 RX ORDER — MEPERIDINE HYDROCHLORIDE 25 MG/ML
12.5 INJECTION INTRAMUSCULAR; INTRAVENOUS; SUBCUTANEOUS
Status: DISCONTINUED | OUTPATIENT
Start: 2022-01-28 | End: 2022-01-28 | Stop reason: HOSPADM

## 2022-01-28 RX ORDER — SODIUM CHLORIDE, SODIUM LACTATE, POTASSIUM CHLORIDE, CALCIUM CHLORIDE 600; 310; 30; 20 MG/100ML; MG/100ML; MG/100ML; MG/100ML
INJECTION, SOLUTION INTRAVENOUS CONTINUOUS
Status: DISCONTINUED | OUTPATIENT
Start: 2022-01-28 | End: 2022-01-28 | Stop reason: HOSPADM

## 2022-01-28 RX ORDER — HYDROMORPHONE HYDROCHLORIDE 1 MG/ML
0.2 INJECTION, SOLUTION INTRAMUSCULAR; INTRAVENOUS; SUBCUTANEOUS
Status: DISCONTINUED | OUTPATIENT
Start: 2022-01-28 | End: 2022-01-28 | Stop reason: HOSPADM

## 2022-01-28 RX ORDER — SUCRALFATE 1 G/1
1 TABLET ORAL
Qty: 120 TABLET | Refills: 3 | Status: SHIPPED | OUTPATIENT
Start: 2022-01-28

## 2022-01-28 RX ADMIN — DEXAMETHASONE SODIUM PHOSPHATE 4 MG: 4 INJECTION, SOLUTION INTRA-ARTICULAR; INTRALESIONAL; INTRAMUSCULAR; INTRAVENOUS; SOFT TISSUE at 07:49

## 2022-01-28 RX ADMIN — ALBUTEROL SULFATE 2.5 MG: 2.5 SOLUTION RESPIRATORY (INHALATION) at 08:26

## 2022-01-28 RX ADMIN — ETOMIDATE 20 MG: 2 INJECTION INTRAVENOUS at 07:49

## 2022-01-28 RX ADMIN — Medication 100 MG: at 07:49

## 2022-01-28 RX ADMIN — ONDANSETRON 4 MG: 2 INJECTION INTRAMUSCULAR; INTRAVENOUS at 07:49

## 2022-01-28 RX ADMIN — FENTANYL CITRATE 100 MCG: 50 INJECTION, SOLUTION INTRAMUSCULAR; INTRAVENOUS at 07:49

## 2022-01-28 RX ADMIN — SODIUM CHLORIDE, POTASSIUM CHLORIDE, SODIUM LACTATE AND CALCIUM CHLORIDE: 600; 310; 30; 20 INJECTION, SOLUTION INTRAVENOUS at 06:39

## 2022-01-28 RX ADMIN — HALOPERIDOL LACTATE 1 MG: 5 INJECTION, SOLUTION INTRAMUSCULAR at 08:38

## 2022-01-28 RX ADMIN — LIDOCAINE HYDROCHLORIDE 4 ML: 40 SOLUTION TOPICAL at 07:52

## 2022-01-28 ASSESSMENT — PAIN DESCRIPTION - PAIN TYPE
TYPE: SURGICAL PAIN
TYPE: ACUTE PAIN

## 2022-01-28 NOTE — ANESTHESIA POSTPROCEDURE EVALUATION
Patient: Jayashree Carrasco    Procedure Summary     Date: 01/28/22 Room / Location: [facility] ROOM 26 / SURGERY SAME DAY [facility]    Anesthesia Start: 0745 Anesthesia Stop: 0820    Procedures:       GASTROSCOPY  (Esophagus)      GASTROSCOPY, WITH BIOPSY (Esophagus)      GASTROSCOPY, WITH BALLOON DILATION (Esophagus) Diagnosis: (EOE, esophageal ring)    Surgeons: Maira Nolasco D.O. Responsible Provider: Fantasma Bernstein M.D.    Anesthesia Type: general ASA Status: 2          Final Anesthesia Type: general  Last vitals  BP   Blood Pressure: 111/73    Temp   36.3 °C (97.3 °F)    Pulse   78   Resp   15    SpO2   90 %      Anesthesia Post Evaluation    Patient location during evaluation: PACU  Patient participation: complete - patient participated  Level of consciousness: awake and alert    Airway patency: patent  Anesthetic complications: no  Cardiovascular status: hemodynamically stable  Respiratory status: acceptable  Hydration status: euvolemic    PONV: none          No complications documented.     Nurse Pain Score: 0 (NPRS)

## 2022-01-28 NOTE — OP REPORT
EGD Report    Date:  1/28/2022    Surgeon: Maira Nolasco D.O., [facility]    Indications: Dysphagia, EoE    Procedure: Diagnostic EGD with TTS dilation and biopsy    Procedure duration: 13 minutes      Procedure in detail, Findings and Endoscopic Diagnosis:      -Prior to the procedure, a History and Physical was performed, and patient medications and allergies were reviewed. The patient’s tolerance of previous anesthesia was also reviewed. The risks and benefits of the procedure and the sedation options and risks were discussed with the patient. All questions were answered, and informed consent was obtained. The patient was deemed in satisfactory condition to undergo the procedure.    -Prior Anticoagulants: the patient has taken no previous anticoagulant or antiplatelet agents.    -ASA Grade Assessment: see anesthesia note     Anesthesia/medications:  see anesthesia note     -The patient was placed in the left lateral decubitus position. The scope was passed under direct vision. Continuous oxygen was provided via nasal cannula and intravenous sedation was administered in divided doses throughout the procedure. The patient’s blood pressure, pulse, and oxygen saturation were monitored continuously throughout the procedure.    -The adult gastroscope was gently advanced under direct visualization over the tongue, down the esophagus, through the stomach and into the 2nd portion of the duodenum. The color, texture, mucosa and anatomy of esophagus, stomach and duodenum were carefully examined with the scope. The scope was then withdrawn from the patient and the procedure terminated. Further details are in the finding section, based on anatomical location.  Retroflexion was performed in the stomach.    -The patient tolerated the procedure well and there were no immediate complications. The patient was then transferred to the recovery room in stable condition.    EBL: Minimal    Complications:  No immediate  complications      Findings:  Duodenum: The mucosa was normal in appearance throughout the entire examined duodenum.  Multiple biopsies were obtained from the duodenal bulb and second portion using a cold biopsy forceps.  The tissue was submitted to pathology.    Stomach: There was mild patchy mucosal erythema in the gastric antrum.  Multiple biopsies were obtained from the gastric antrum and body using a cold biopsy forceps.  The tissue was submitted to pathology.    Esophagus: The DH was located 38 cm from the incisors.  The EGF was located 38 cm from the incisors.  The SCJ was located 38 cm from the incisors.  There was a stricture at the GEJ that was easily traversable without resistance with the adult endoscope.  There was a nonobstructing distal esophageal ring approximately 2 cm above the GEJ.  A TTS balloon dilation was performed.  A single pass with the 15-16.5-18 mm TTS balloon was performed.  The balloon was slowly inflated to 16.5 mm and held inflated for 60 seconds.  The balloon was then deflated and removed.  There was a mucosal tear following dilation.  There were linear furrows and small concentric rings in the mid and proximal esophagus consistent in appearance with her known EoE.  Multiple biopsies were obtained from the distal and midesophagus using a cold biopsy forceps.  The tissue was submitted to pathology.      Summary of Findings:  -Normal duodenum; biopsied  -Erythematous gastropathy; biopsied  -Distal esophageal stricture; dilated  -Distal esophageal ring; dilated  -Esophageal linear furrows and concentric rings consistent with known EOE; biopsied        Recommendations:  -A letter will be sent regarding to the biopsy result in about 2-4 weeks  -Discharge home when PACU criteria met  -Resume a clear liquid diet for 24 hours then slowly advance as tolerated  -Stop Protonix  -Start Prilosec 40 mg p.o. twice daily taken 30 minutes prior to meal  -Start sucralfate 1 g solution 4 times daily  x14 days  -Avoid NSAIDs  -Resume all other current medications  -Repeat EGD in 8 weeks  -Follow-up with Dr. Nolasco as scheduled        Maira Nolasco DO, FACP

## 2022-01-28 NOTE — ANESTHESIA PROCEDURE NOTES
Airway    Date/Time: 1/28/2022 7:52 AM  Performed by: Fantasma Bernstein M.D.  Authorized by: Fantasma Bernstein M.D.     Location:  OR  Urgency:  Elective  Indications for Airway Management:  Anesthesia      Spontaneous Ventilation: absent    Sedation Level:  Deep  Preoxygenated: Yes    Patient Position:  Sniffing  Final Airway Type:  Endotracheal airway  Final Endotracheal Airway:  ETT  Cuffed: Yes    Technique Used for Successful ETT Placement:  Direct laryngoscopy    Insertion Site:  Oral  Blade Type:  William  Laryngoscope Blade/Videolaryngoscope Blade Size:  3  ETT Size (mm):  7.0  Measured from:  Teeth  ETT to Teeth (cm):  22  Placement Verified by: auscultation and capnometry    Cormack-Lehane Classification:  Grade I - full view of glottis  Number of Attempts at Approach:  1

## 2022-01-28 NOTE — DISCHARGE INSTRUCTIONS
GASTROSCOPY OR ERCP  1. Don't eat or drink anything for about an hour after the test. You can then resume your regular diet.   2. Don't drive or drink alcohol for 24 hours. The medication you received will make you too drowsy.  3. Don't take any coffee, tea, or aspirin products until after you see your doctor. These can harm the lining of your stomach.  4. If you begin to vomit bloody material, or develop black or bloody stools, call your doctor as soon as possible.   5. If you have any neck, chest, abdominal pain or temp over 100 degrees call your doctor.   6. Follow up with your doctor.  Dr. Nolasco [phone]  7. Additional instructions: Clear liquids today (for 24 hours)- jello, apple juice, broths, gatorade. Then you can advance your diet to your regular diet tomorrow.   8. Prescriptions: Omeprazole twice a day.       Discharge time:     You should call 911 if you develop problems with breathing or chest pain.    Nurse's Signature: ___________________________________    I acknowledge receipt and understanding of these Home Care instructions.

## 2022-01-28 NOTE — OR NURSING
"0816- Pt arrived to PACU, report received.  Pt on 6L mask and has no complaints of pain or nausea at this time.      0825- Pt complains of tightness with breathing, coughing a lot.  Pt given a breathing treatment per MAR.     0852- phoned patient's daughter \"Carol\". Given updates and is coming to hospital to provide transportation home.  Patient's breathing is now unlabored and nausea gone.  Tolerating sips of water.    0900- transitioned to phase 2    0910- Pt ambulated to restroom to void and get dressed.      0920- IV's d/c with both tips intact.     0927- Discharge instructions discussed with patient and her daughter.  All questions answered at this time.   "

## 2022-01-28 NOTE — ANESTHESIA PREPROCEDURE EVALUATION
Case: [id number] Date/Time: 01/28/22 0715    Procedure: GASTROSCOPY - WITH DILATION (Esophagus)    Anesthesia type: MAC    Pre-op diagnosis: BARRETTS ESOPHAGUS WITH DYSPLASIA, UNSPECIFIED, GERD    Location: [facility] ROOM 26 / SURGERY SAME DAY [facility]    Surgeons: Maira Nolasco D.O.      Brief Past Medical History    Anesthesia: Hx of Severe asthma attack with propofol per patient during previous endoscopies, very adamant about no propofol.    Cards: No History of CHF, CAD, or Stents. > 4 METS.  Resp: +Asthma, took inhaler this AM.   GI: No Daily Symptomatic GERD. NPO per guidelines.  Neuro: No History of CVA , TIA, or Seizures.   Endo: No History of Diabetes  Renal: No active problems  MSK: No active problems      Relevant Problems   PULMONARY   (positive) Asthma   (positive) Mild intermittent asthma without complication      NEURO   (positive) History of papillary adenocarcinoma of thyroid   (positive) Hx of thyroid cancer      GI   (positive) GERD (gastroesophageal reflux disease)      ENDO   (positive) Hypothyroidism   (positive) Hypothyroidism, postsurgical       Physical Exam    Airway   Mallampati: II  TM distance: >3 FB  Neck ROM: full       Cardiovascular - normal exam  Rhythm: regular  Rate: normal  (-) murmur     Dental - normal exam           Pulmonary - normal exam  Breath sounds clear to auscultation     Abdominal    Neurological - normal exam                 Anesthesia Plan    ASA 2       Plan - general       Airway plan will be ETT          Induction: intravenous    Postoperative Plan: Postoperative administration of opioids is intended.    Pertinent diagnostic labs and testing reviewed    Informed Consent:    Anesthetic plan and risks discussed with patient.    Use of blood products discussed with: patient whom consented to blood products.

## 2022-02-03 ENCOUNTER — OFFICE VISIT (OUTPATIENT)
Dept: URGENT CARE | Facility: CLINIC | Age: 52
End: 2022-02-03
Payer: MEDICAID

## 2022-02-03 ENCOUNTER — HOSPITAL ENCOUNTER (OUTPATIENT)
Facility: MEDICAL CENTER | Age: 52
End: 2022-02-03
Attending: NURSE PRACTITIONER
Payer: MEDICAID

## 2022-02-03 VITALS
OXYGEN SATURATION: 96 % | SYSTOLIC BLOOD PRESSURE: 116 MMHG | HEIGHT: 63 IN | BODY MASS INDEX: 39.41 KG/M2 | DIASTOLIC BLOOD PRESSURE: 60 MMHG | RESPIRATION RATE: 16 BRPM | TEMPERATURE: 96.9 F | HEART RATE: 83 BPM | WEIGHT: 222.4 LBS

## 2022-02-03 DIAGNOSIS — J45.41 MODERATE PERSISTENT ASTHMA WITH ACUTE EXACERBATION: ICD-10-CM

## 2022-02-03 DIAGNOSIS — J02.9 PHARYNGITIS, UNSPECIFIED ETIOLOGY: ICD-10-CM

## 2022-02-03 DIAGNOSIS — R05.9 COUGH: ICD-10-CM

## 2022-02-03 PROCEDURE — U0005 INFEC AGEN DETEC AMPLI PROBE: HCPCS

## 2022-02-03 PROCEDURE — U0003 INFECTIOUS AGENT DETECTION BY NUCLEIC ACID (DNA OR RNA); SEVERE ACUTE RESPIRATORY SYNDROME CORONAVIRUS 2 (SARS-COV-2) (CORONAVIRUS DISEASE [COVID-19]), AMPLIFIED PROBE TECHNIQUE, MAKING USE OF HIGH THROUGHPUT TECHNOLOGIES AS DESCRIBED BY CMS-2020-01-R: HCPCS

## 2022-02-03 PROCEDURE — 99214 OFFICE O/P EST MOD 30 MIN: CPT | Performed by: NURSE PRACTITIONER

## 2022-02-03 PROCEDURE — 87081 CULTURE SCREEN ONLY: CPT

## 2022-02-03 RX ORDER — PREDNISONE 10 MG/1
40 TABLET ORAL DAILY
Qty: 20 TABLET | Refills: 0 | Status: SHIPPED | OUTPATIENT
Start: 2022-02-03 | End: 2022-02-08

## 2022-02-03 RX ORDER — ALBUTEROL SULFATE 90 UG/1
2 AEROSOL, METERED RESPIRATORY (INHALATION) EVERY 6 HOURS PRN
Qty: 8.5 G | Refills: 0 | Status: SHIPPED | OUTPATIENT
Start: 2022-02-03

## 2022-02-03 RX ORDER — DEXTROMETHORPHAN HYDROBROMIDE AND PROMETHAZINE HYDROCHLORIDE 15; 6.25 MG/5ML; MG/5ML
5 SYRUP ORAL EVERY 4 HOURS PRN
Qty: 120 ML | Refills: 0 | Status: SHIPPED | OUTPATIENT
Start: 2022-02-03

## 2022-02-03 RX ORDER — ALBUTEROL SULFATE 2.5 MG/3ML
2.5 SOLUTION RESPIRATORY (INHALATION) EVERY 4 HOURS PRN
Qty: 3 ML | Refills: 0 | Status: SHIPPED | OUTPATIENT
Start: 2022-02-03

## 2022-02-04 DIAGNOSIS — J02.9 PHARYNGITIS, UNSPECIFIED ETIOLOGY: ICD-10-CM

## 2022-02-04 LAB
COVID ORDER STATUS COVID19: NORMAL
SARS-COV-2 RNA RESP QL NAA+PROBE: DETECTED
SPECIMEN SOURCE: ABNORMAL

## 2022-02-06 LAB
S PYO SPEC QL CULT: NORMAL
SIGNIFICANT IND 70042: NORMAL
SITE SITE: NORMAL
SOURCE SOURCE: NORMAL

## 2022-02-07 ASSESSMENT — ENCOUNTER SYMPTOMS
VOMITING: 0
HEADACHES: 1
FEVER: 0
SHORTNESS OF BREATH: 0
NAUSEA: 0
DIZZINESS: 0
EYE PAIN: 0
COUGH: 1
MYALGIAS: 0
CHILLS: 0
SORE THROAT: 1
RHINORRHEA: 1

## 2022-02-07 NOTE — PROGRESS NOTES
Subjective:   Jayashree Carrasco is a 51 y.o. female who presents for Sore Throat (cough, congestion, fever x 4 days)      URI   This is a new problem. Episode onset: 4 days. The problem has been unchanged. The maximum temperature recorded prior to her arrival was 100.4 - 100.9 F. The fever has been present for 1 to 2 days. Associated symptoms include congestion, coughing, headaches, rhinorrhea and a sore throat. Pertinent negatives include no chest pain, ear pain, nausea, plugged ear sensation, rash or vomiting. She has tried acetaminophen for the symptoms. The treatment provided no relief.       Review of Systems   Constitutional: Positive for malaise/fatigue. Negative for chills and fever.   HENT: Positive for congestion, rhinorrhea and sore throat. Negative for ear pain.    Eyes: Negative for pain.   Respiratory: Positive for cough. Negative for shortness of breath.    Cardiovascular: Negative for chest pain.   Gastrointestinal: Negative for nausea and vomiting.   Genitourinary: Negative for hematuria.   Musculoskeletal: Negative for myalgias.   Skin: Negative for rash.   Neurological: Positive for headaches. Negative for dizziness.       Medications:    • Acetaminophen Extra Strength Tabs  • albuterol Aers  • albuterol Nebu  • fluticasone  • levothyroxine Tabs  • lidocaine Oint  • metroNIDAZOLE Gel  • NON SPECIFIED  • nortriptyline Caps  • omeprazole  • predniSONE Tabs  • promethazine-dextromethorphan  • sucralfate Tabs  • vitamin D2 (Ergocalciferol) Caps  • Vitamin D3 Caps    Allergies: Propofol, Ciprofloxacin, Sulfa drugs, and Seasonal    Problem List: Jayashree Carrasco does not have any pertinent problems on file.    Surgical History:  Past Surgical History:   Procedure Laterality Date   • [place] UPPER GI ENDOSCOPY,DIAGNOSIS  1/28/2022    Procedure: GASTROSCOPY ;  Surgeon: Maira Nolasco D.O.;  Location: SURGERY SAME DAY [facility];  Service: Gastroenterology   • [place] UPPER GI ENDOSCOPY,BIOPSY  1/28/2022     "Procedure: GASTROSCOPY, WITH BIOPSY;  Surgeon: Maira Nolasco D.O.;  Location: SURGERY SAME DAY [facility];  Service: Gastroenterology   • IN UPPER GI ENDOSCOPY,W/DILAT,GASTRIC OUT  1/28/2022    Procedure: GASTROSCOPY, WITH BALLOON DILATION;  Surgeon: Maira Nolasco D.O.;  Location: SURGERY SAME DAY [facility];  Service: Gastroenterology   • IN UPPER GI ENDOSCOPY,DIAGNOSIS N/A 6/4/2021    Procedure: GASTROSCOPY;  Surgeon: Maira Nolasco D.O.;  Location: SURGERY SAME DAY [facility];  Service: Gastroenterology   • IN UPPER GI ENDOSCOPY,SCLER INJECT N/A 6/4/2021    Procedure: GASTROSCOPY, WITH SCLEROTHERAPY;  Surgeon: Maira Nolasco D.O.;  Location: SURGERY SAME DAY [facility];  Service: Gastroenterology   • IN UPPER GI ENDOSCOPY,BIOPSY N/A 6/4/2021    Procedure: GASTROSCOPY, WITH BIOPSY;  Surgeon: Maira Nolasco D.O.;  Location: SURGERY SAME DAY [facility];  Service: Gastroenterology   • IN UPPER GI ENDOSCOPY,W/DILAT,GASTRIC OUT N/A 6/4/2021    Procedure: GASTROSCOPY, WITH BALLOON DILATION;  Surgeon: Maira Nolasco D.O.;  Location: SURGERY SAME DAY [facility];  Service: Gastroenterology   • CHOLECYSTECTOMY  2003   • THYROIDECTOMY  1999   • ABDOMINAL HYSTERECTOMY TOTAL         Past Social Hx: Jayashree Carrasco  reports that she quit smoking about 5 years ago. Her smoking use included cigarettes. She quit after 20.00 years of use. She has never used smokeless tobacco. She reports previous alcohol use. She reports that she does not use drugs.     Past Family Hx:  Jayashree Carrasco family history includes Cancer in her mother; Cancer (age of onset: 51) in her cousin; Cancer (age of onset: 72) in her maternal aunt; Diabetes in her maternal grandfather; Stroke (age of onset: 40) in her mother.     Problem list, medications, and allergies reviewed by myself today in Epic.     Objective:     /60   Pulse 83   Temp 36.1 °C (96.9 °F) (Temporal)   Resp 16   Ht 1.6 m (5' 3\")   Wt 101 kg (222 lb 6.4 oz)   LMP 11/21/2015  "  SpO2 96%   BMI 39.40 kg/m²     Physical Exam  Vitals and nursing note reviewed.   Constitutional:       General: She is not in acute distress.     Appearance: She is well-developed.   HENT:      Head: Normocephalic and atraumatic.      Right Ear: Tympanic membrane and external ear normal.      Left Ear: Tympanic membrane and external ear normal.      Nose: Nose normal.      Right Sinus: No maxillary sinus tenderness or frontal sinus tenderness.      Left Sinus: No maxillary sinus tenderness or frontal sinus tenderness.      Mouth/Throat:      Mouth: Mucous membranes are moist.      Pharynx: Uvula midline. No posterior oropharyngeal erythema.      Tonsils: No tonsillar exudate or tonsillar abscesses.   Eyes:      General:         Right eye: No discharge.         Left eye: No discharge.      Conjunctiva/sclera: Conjunctivae normal.   Cardiovascular:      Rate and Rhythm: Normal rate.   Pulmonary:      Effort: Pulmonary effort is normal. No respiratory distress.      Breath sounds: Wheezing present.   Abdominal:      General: There is no distension.   Musculoskeletal:         General: Normal range of motion.   Skin:     General: Skin is warm and dry.   Neurological:      General: No focal deficit present.      Mental Status: She is alert and oriented to person, place, and time. Mental status is at baseline.      Gait: Gait (gait at baseline) normal.   Psychiatric:         Judgment: Judgment normal.         Assessment/Plan:     Diagnosis and associated orders:     1. Pharyngitis, unspecified etiology  SARS-CoV-2 PCR (24 hour In-House): Collect NP swab in [place]    Beta Strep Screen (Gp. A)    CANCELED: POCT Rapid Strep A   2. Moderate persistent asthma with acute exacerbation  predniSONE (DELTASONE) 10 MG Tab    albuterol 108 (90 Base) MCG/ACT Aero Soln inhalation aerosol    albuterol (PROVENTIL) 2.5mg/3ml Nebu Soln solution for nebulization   3. Cough  promethazine-dextromethorphan (PROMETHAZINE-DM) 6.25-15 MG/5ML  syrup      Comments/MDM:     The patient's presenting symptoms and exam findings most likely are due to a viral etiology.     Test for COVID-19 via PCR. Result will be reviewed by myself. We will call/message back for results and appropriate further instructions. Instructed to sign up for [employer] if they have not already. Result will be automatically released to [employer] application for patient review. I will be sending a message with Next Step Instructions to [employer] soon after resulted.   Symptomatic and supportive care:   Plenty of oral hydration and rest   Over the counter cough suppressant as directed.  Tylenol or ibuprofen for pain and fever as directed.   Warm salt water gargles for sore throat, soft foods, cool liquids.   Saline nasal spray and Flonase as a decongestant.   Infection control measures at home. Stay away from people, Hand washing, covering sneeze/cough, disinfect surfaces.   Remain home from work, school, and other populated environments. Work note provided with information of quarantine measures per CDC guidelines.   Overall, the patient is well-appearing. They are not hypoxic, afebrile, and a normal pulmonary exam.      •   •            Please note that this dictation was created using voice recognition software. I have made a reasonable attempt to correct obvious errors, but I expect that there are errors of grammar and possibly content that I did not discover before finalizing the note.    This note was electronically signed by Christian ARMSTRONG.

## 2022-02-13 ENCOUNTER — OFFICE VISIT (OUTPATIENT)
Dept: URGENT CARE | Facility: CLINIC | Age: 52
End: 2022-02-13
Payer: MEDICAID

## 2022-02-13 VITALS
HEIGHT: 63 IN | WEIGHT: 226 LBS | BODY MASS INDEX: 40.04 KG/M2 | HEART RATE: 96 BPM | DIASTOLIC BLOOD PRESSURE: 84 MMHG | SYSTOLIC BLOOD PRESSURE: 116 MMHG | OXYGEN SATURATION: 97 % | TEMPERATURE: 97.5 F | RESPIRATION RATE: 14 BRPM

## 2022-02-13 DIAGNOSIS — J01.00 ACUTE NON-RECURRENT MAXILLARY SINUSITIS: ICD-10-CM

## 2022-02-13 DIAGNOSIS — Z86.16 HISTORY OF COVID-19: ICD-10-CM

## 2022-02-13 PROCEDURE — 99213 OFFICE O/P EST LOW 20 MIN: CPT | Performed by: NURSE PRACTITIONER

## 2022-02-13 RX ORDER — FLUTICASONE PROPIONATE 220 UG/1
AEROSOL, METERED RESPIRATORY (INHALATION)
COMMUNITY
Start: 2022-02-04

## 2022-02-13 RX ORDER — AMOXICILLIN AND CLAVULANATE POTASSIUM 875; 125 MG/1; MG/1
1 TABLET, FILM COATED ORAL 2 TIMES DAILY
Qty: 14 TABLET | Refills: 0 | Status: SHIPPED | OUTPATIENT
Start: 2022-02-13 | End: 2022-02-20

## 2022-02-13 ASSESSMENT — ENCOUNTER SYMPTOMS
STRIDOR: 0
CHILLS: 0
DIZZINESS: 0
FEVER: 0
EYE PAIN: 0
NAUSEA: 0
SINUS PRESSURE: 1
SINUS PAIN: 1
VOMITING: 0
SWOLLEN GLANDS: 0
SHORTNESS OF BREATH: 0
COUGH: 0
SORE THROAT: 0
NECK PAIN: 0
MYALGIAS: 0
HEADACHES: 1

## 2022-02-13 NOTE — PROGRESS NOTES
Subjective:   Jayashree Carrasco is a 51 y.o. female who presents for Sinusitis (x 1 week with sinus pain and congestion.  Denies fever or chills.  Pt. tested Positive for Covid on 02-03-22. )      Sinusitis  This is a new problem. The current episode started in the past 7 days (recently tested positive COVID 2/3, sinus pain worse in last few days). The problem has been gradually worsening since onset. There has been no fever. Her pain is at a severity of 8/10. The pain is moderate. Associated symptoms include congestion, headaches and sinus pressure. Pertinent negatives include no chills, coughing, ear pain, neck pain, shortness of breath, sore throat or swollen glands. Past treatments include acetaminophen. The treatment provided no relief.       Review of Systems   Constitutional: Positive for malaise/fatigue. Negative for chills and fever.   HENT: Positive for congestion, sinus pressure and sinus pain. Negative for ear pain and sore throat.    Eyes: Negative for pain.   Respiratory: Negative for cough, shortness of breath and stridor.    Cardiovascular: Negative for chest pain.   Gastrointestinal: Negative for nausea and vomiting.   Genitourinary: Negative for hematuria.   Musculoskeletal: Negative for myalgias and neck pain.   Skin: Negative for rash.   Neurological: Positive for headaches. Negative for dizziness.       Medications:    • Acetaminophen Extra Strength Tabs  • albuterol Aers  • albuterol Nebu  • amoxicillin-clavulanate Tabs  • fluticasone  • levothyroxine Tabs  • lidocaine Oint  • metroNIDAZOLE Gel  • NON SPECIFIED  • nortriptyline Caps  • omeprazole  • promethazine-dextromethorphan  • sucralfate Tabs  • vitamin D2 (Ergocalciferol) Caps  • Vitamin D3 Caps    Allergies: Propofol, Ciprofloxacin, Sulfa drugs, and Seasonal    Problem List: Jayashree Carrasco does not have any pertinent problems on file.    Surgical History:  Past Surgical History:   Procedure Laterality Date   • [place] UPPER GI  ENDOSCOPY,DIAGNOSIS  1/28/2022    Procedure: GASTROSCOPY ;  Surgeon: Maira Nolasco D.O.;  Location: SURGERY SAME DAY [facility];  Service: Gastroenterology   • [place] UPPER GI ENDOSCOPY,BIOPSY  1/28/2022    Procedure: GASTROSCOPY, WITH BIOPSY;  Surgeon: Maira Nolasco D.O.;  Location: SURGERY SAME DAY [facility];  Service: Gastroenterology   • PA UPPER GI ENDOSCOPY,W/DILAT,GASTRIC OUT  1/28/2022    Procedure: GASTROSCOPY, WITH BALLOON DILATION;  Surgeon: Maira Nolasco D.O.;  Location: SURGERY SAME DAY [facility];  Service: Gastroenterology   • PA UPPER GI ENDOSCOPY,DIAGNOSIS N/A 6/4/2021    Procedure: GASTROSCOPY;  Surgeon: Maira Nolasco D.O.;  Location: SURGERY SAME DAY [facility];  Service: Gastroenterology   • PA UPPER GI ENDOSCOPY,SCLER INJECT N/A 6/4/2021    Procedure: GASTROSCOPY, WITH SCLEROTHERAPY;  Surgeon: Maira Nolasco D.O.;  Location: SURGERY SAME DAY [facility];  Service: Gastroenterology   • PA UPPER GI ENDOSCOPY,BIOPSY N/A 6/4/2021    Procedure: GASTROSCOPY, WITH BIOPSY;  Surgeon: Maira Nolasco D.O.;  Location: SURGERY SAME DAY [facility];  Service: Gastroenterology   • PA UPPER GI ENDOSCOPY,W/DILAT,GASTRIC OUT N/A 6/4/2021    Procedure: GASTROSCOPY, WITH BALLOON DILATION;  Surgeon: Maira Nolasco D.O.;  Location: SURGERY SAME DAY [facility];  Service: Gastroenterology   • CHOLECYSTECTOMY  2003   • THYROIDECTOMY  1999   • ABDOMINAL HYSTERECTOMY TOTAL         Past Social Hx: Jayashree Carrasco  reports that she quit smoking about 5 years ago. Her smoking use included cigarettes. She quit after 20.00 years of use. She has never used smokeless tobacco. She reports previous alcohol use. She reports that she does not use drugs.     Past Family Hx:  Jayashree Carrasco family history includes Cancer in her mother; Cancer (age of onset: 51) in her cousin; Cancer (age of onset: 72) in her maternal aunt; Diabetes in her maternal grandfather; Stroke (age of onset: 40) in her mother.     Problem list, medications,  "and allergies reviewed by myself today in Epic.     Objective:     /84   Pulse 96   Temp 36.4 °C (97.5 °F) (Temporal)   Resp 14   Ht 1.6 m (5' 3\")   Wt 103 kg (226 lb)   LMP 11/21/2015   SpO2 97%   BMI 40.03 kg/m²     Physical Exam  Vitals and nursing note reviewed.   Constitutional:       General: She is not in acute distress.     Appearance: She is well-developed.   HENT:      Head: Normocephalic and atraumatic.      Right Ear: Tympanic membrane and external ear normal.      Left Ear: Tympanic membrane and external ear normal.      Nose:      Right Sinus: Maxillary sinus tenderness present. No frontal sinus tenderness.      Left Sinus: Maxillary sinus tenderness present. No frontal sinus tenderness.      Mouth/Throat:      Mouth: Mucous membranes are moist.      Pharynx: Uvula midline. No posterior oropharyngeal erythema.      Tonsils: No tonsillar exudate or tonsillar abscesses.   Eyes:      General:         Right eye: No discharge.         Left eye: No discharge.      Conjunctiva/sclera: Conjunctivae normal.   Cardiovascular:      Rate and Rhythm: Normal rate.   Pulmonary:      Effort: Pulmonary effort is normal. No respiratory distress.      Breath sounds: Normal breath sounds.   Abdominal:      General: There is no distension.   Musculoskeletal:         General: Normal range of motion.   Skin:     General: Skin is warm and dry.   Neurological:      General: No focal deficit present.      Mental Status: She is alert and oriented to person, place, and time. Mental status is at baseline.      Gait: Gait (gait at baseline) normal.   Psychiatric:         Judgment: Judgment normal.         Assessment/Plan:     Diagnosis and associated orders:     1. Acute non-recurrent maxillary sinusitis  amoxicillin-clavulanate (AUGMENTIN) 875-125 MG Tab   2. History of COVID-19        Comments/MDM:     I personally reviewed prior external notes and prior test results pertinent to today's visit.   Discussed " management options, risks and benefits, and alternatives to treatment plan agreed upon.   Red flags discussed and indications to immediately call 911 or present to the Emergency Department.   Supportive care, differential diagnoses, and indications for immediate follow-up discussed with patient.    • Patient expresses understanding and agrees to plan. Patient denies any other questions or concerns.   •                Please note that this dictation was created using voice recognition software. I have made a reasonable attempt to correct obvious errors, but I expect that there are errors of grammar and possibly content that I did not discover before finalizing the note.    This note was electronically signed by Christian ARMSTRONG.

## 2022-04-11 ENCOUNTER — HOSPITAL ENCOUNTER (OUTPATIENT)
Facility: MEDICAL CENTER | Age: 52
End: 2022-04-11
Attending: STUDENT IN AN ORGANIZED HEALTH CARE EDUCATION/TRAINING PROGRAM
Payer: MEDICAID

## 2022-04-11 ENCOUNTER — OFFICE VISIT (OUTPATIENT)
Dept: MEDICAL GROUP | Facility: CLINIC | Age: 52
End: 2022-04-11
Payer: MEDICAID

## 2022-04-11 VITALS
DIASTOLIC BLOOD PRESSURE: 76 MMHG | WEIGHT: 224 LBS | HEIGHT: 64 IN | TEMPERATURE: 97.3 F | HEART RATE: 74 BPM | RESPIRATION RATE: 16 BRPM | SYSTOLIC BLOOD PRESSURE: 109 MMHG | BODY MASS INDEX: 38.24 KG/M2 | OXYGEN SATURATION: 95 %

## 2022-04-11 DIAGNOSIS — J30.2 SEASONAL ALLERGIES: ICD-10-CM

## 2022-04-11 DIAGNOSIS — J01.11 ACUTE RECURRENT FRONTAL SINUSITIS: ICD-10-CM

## 2022-04-11 DIAGNOSIS — J45.20 MILD INTERMITTENT ASTHMA WITHOUT COMPLICATION: ICD-10-CM

## 2022-04-11 DIAGNOSIS — Z13.1 SCREENING FOR DIABETES MELLITUS: ICD-10-CM

## 2022-04-11 DIAGNOSIS — J30.1 SEASONAL ALLERGIC RHINITIS DUE TO POLLEN: ICD-10-CM

## 2022-04-11 DIAGNOSIS — R30.0 DYSURIA: ICD-10-CM

## 2022-04-11 PROCEDURE — 99214 OFFICE O/P EST MOD 30 MIN: CPT | Mod: GC,25 | Performed by: STUDENT IN AN ORGANIZED HEALTH CARE EDUCATION/TRAINING PROGRAM

## 2022-04-11 PROCEDURE — 87086 URINE CULTURE/COLONY COUNT: CPT

## 2022-04-11 RX ORDER — ALBUTEROL SULFATE 90 UG/1
2 AEROSOL, METERED RESPIRATORY (INHALATION) EVERY 4 HOURS PRN
Qty: 8.5 G | Refills: 4 | Status: SHIPPED | OUTPATIENT
Start: 2022-04-11

## 2022-04-11 RX ORDER — NITROFURANTOIN 25; 75 MG/1; MG/1
100 CAPSULE ORAL 2 TIMES DAILY
Qty: 10 CAPSULE | Refills: 0 | Status: SHIPPED | OUTPATIENT
Start: 2022-04-11 | End: 2022-04-16

## 2022-04-11 RX ORDER — FLUTICASONE PROPIONATE 50 MCG
1 SPRAY, SUSPENSION (ML) NASAL DAILY
Qty: 9.9 ML | Refills: 4 | Status: SHIPPED | OUTPATIENT
Start: 2022-04-11

## 2022-04-11 RX ORDER — TRIAMCINOLONE ACETONIDE 40 MG/ML
40 INJECTION, SUSPENSION INTRA-ARTICULAR; INTRAMUSCULAR ONCE
Status: COMPLETED | OUTPATIENT
Start: 2022-04-11 | End: 2022-04-11

## 2022-04-11 RX ADMIN — TRIAMCINOLONE ACETONIDE 40 MG: 40 INJECTION, SUSPENSION INTRA-ARTICULAR; INTRAMUSCULAR at 11:25

## 2022-04-11 NOTE — ASSESSMENT & PLAN NOTE
Refilled albuterol. Will need to dive further into how much she is using it at follow-up visit to see if she needs daily inhaler.

## 2022-04-11 NOTE — PROGRESS NOTES
Subjective:     CC: seasonal allergies    HPI:   Jayashree presents today with concern for worsening seasonal allergies    Problem   Bmi 38.0-38.9,Adult   Screening for Diabetes Mellitus   Dysuria    History of UTIs. Has been having pain with urination recently. No fever/ chills.      Acute Recurrent Frontal Sinusitis   Seasonal Allergic Rhinitis Due to Pollen   Seasonal Allergies    She notes that she typically gets Kenalog injections approximately once per year for allergies     Mild Intermittent Asthma Without Complication    History of asthma. Using albuterol. Not on daily inhaler. Notes that her asthma is worse when her allergies flare.         Current Outpatient Medications Ordered in Epic   Medication Sig Dispense Refill   • albuterol 108 (90 Base) MCG/ACT Aero Soln inhalation aerosol Inhale 2 Puffs every four hours as needed for Shortness of Breath. 8.5 g 4   • fluticasone (FLONASE) 50 MCG/ACT nasal spray Administer 1 Spray into affected nostril(S) every day. 9.9 mL 4   • nitrofurantoin (MACROBID) 100 MG Cap Take 1 Capsule by mouth 2 times a day for 5 days. 10 Capsule 0   • FLOVENT  MCG/ACT Aerosol SWALLOW 4 PUFFS TWICE DAILY FOR 8 WEEKS     • albuterol 108 (90 Base) MCG/ACT Aero Soln inhalation aerosol Inhale 2 Puffs every 6 hours as needed for Shortness of Breath. 8.5 g 0   • albuterol (PROVENTIL) 2.5mg/3ml Nebu Soln solution for nebulization Take 3 mL by nebulization every four hours as needed for Shortness of Breath. 3 mL 0   • promethazine-dextromethorphan (PROMETHAZINE-DM) 6.25-15 MG/5ML syrup Take 5 mL by mouth every four hours as needed. 120 mL 0   • omeprazole (PRILOSEC) 40 MG delayed-release capsule Take 1 Capsule by mouth 2 times a day. 180 Capsule 3   • sucralfate (CARAFATE) 1 GM Tab Take 1 Tablet by mouth 4 Times a Day,Before Meals and at Bedtime. 120 Tablet 3   • ACETAMINOPHEN EXTRA STRENGTH 500 MG Tab      • Cholecalciferol (VITAMIN D3) 1.25 MG (44972 UT) Cap Take 1 Capsule by mouth every  "7 days.     • metroNIDAZOLE (METROGEL-VAGINAL) 0.75 % Gel INSERT 1 APPLICATORFUL VAGINALLY AT BEDTIME FOR 5 DAYS. (Patient not taking: Reported on 2/13/2022)     • levothyroxine (SYNTHROID) 137 MCG Tab Take 1 Tablet by mouth 2 times a day. 96 Tablet 3   • vitamin D2, Ergocalciferol, (DRISDOL) 1.25 MG (64552 UT) Cap capsule Take 1 Capsule by mouth every 7 days. 12 Capsule 3   • lidocaine (XYLOCAINE) 5 % Ointment      • sucralfate (CARAFATE) 1 GM Tab      • albuterol 108 (90 Base) MCG/ACT Aero Soln inhalation aerosol Inhale 2 Puffs every 6 hours as needed for Shortness of Breath. 8.5 g 0   • NON SPECIFIED amberen - menapause supplement (Patient not taking: Reported on 2/13/2022)     • nortriptyline (PAMELOR) 10 MG Cap Take 10 mg by mouth every bedtime.  0     No current [facility]-ordered facility-administered medications on file.     ROS:  Gen: no fevers/chills  Eyes: no changes in vision  ENT: yes sinus pressure  Pulm: yes wheezing  CV: no chest pain  GI: no nausea/vomiting  : yes dysuria  Skin: no rash  Neuro: no headaches    Objective:     Exam:  /76   Pulse 74   Temp 36.3 °C (97.3 °F) (Temporal)   Resp 16   Ht 1.626 m (5' 4\")   Wt 102 kg (224 lb)   LMP 11/21/2015   SpO2 95%   BMI 38.45 kg/m²  Body mass index is 38.45 kg/m².    Gen: Alert and oriented, No apparent distress.  Neck: Neck is supple without lymphadenopathy.  Lungs: Normal effort, CTA bilaterally, no wheezes, rhonchi, or rales  CV: Regular rate and rhythm. No murmurs, rubs, or gallops.  Ext: No clubbing, cyanosis, edema.    Assessment & Plan:     51 y.o. female with the following -     Problem List Items Addressed This Visit     Mild intermittent asthma without complication     Refilled albuterol. Will need to dive further into how much she is using it at follow-up visit to see if she needs daily inhaler.          Relevant Medications    albuterol 108 (90 Base) MCG/ACT Aero Soln inhalation aerosol    Seasonal allergies     Discussed that " Kenalog injection likely too potent for allergies. Risks of chronic steroid injections include effects on bone and skin. Patient still wanted to proceed. Nurse completed injection of 40 mg Kenalog IM. Can continue utilizing antihistamines.          BMI 38.0-38.9,adult    Screening for diabetes mellitus     Follow-up in 1 month for lab follow-up and to discuss asthma         Relevant Orders    HEMOGLOBIN A1C    Dysuria     POC urine today was negative except for some dehydration and trace blood. Sent urine for culture. 5 day course of Macrobid.         Relevant Medications    nitrofurantoin (MACROBID) 100 MG Cap    Other Relevant Orders    URINE CULTURE(NEW) (Completed)    Acute recurrent frontal sinusitis    Relevant Medications    fluticasone (FLONASE) 50 MCG/ACT nasal spray    Seasonal allergic rhinitis due to pollen    Relevant Medications    albuterol 108 (90 Base) MCG/ACT Aero Soln inhalation aerosol    fluticasone (FLONASE) 50 MCG/ACT nasal spray        No follow-ups on file.

## 2022-04-11 NOTE — ASSESSMENT & PLAN NOTE
Discussed that Kenalog injection likely too potent for allergies. Risks of chronic steroid injections include effects on bone and skin. Patient still wanted to proceed. Nurse completed injection of 40 mg Kenalog IM. Can continue utilizing antihistamines.    Emergent/ED

## 2022-04-11 NOTE — ASSESSMENT & PLAN NOTE
POC urine today was negative except for some dehydration and trace blood. Sent urine for culture. 5 day course of Macrobid.

## 2022-04-12 LAB
AMBIGUOUS DTTM AMBI4: NORMAL
SIGNIFICANT IND 70042: NORMAL
SITE SITE: NORMAL
SOURCE SOURCE: NORMAL

## 2022-04-14 LAB
BACTERIA UR CULT: NORMAL
SIGNIFICANT IND 70042: NORMAL
SITE SITE: NORMAL
SOURCE SOURCE: NORMAL

## 2022-04-24 ENCOUNTER — HOSPITAL ENCOUNTER (EMERGENCY)
Facility: MEDICAL CENTER | Age: 52
End: 2022-04-24
Attending: EMERGENCY MEDICINE
Payer: MEDICAID

## 2022-04-24 ENCOUNTER — APPOINTMENT (OUTPATIENT)
Dept: RADIOLOGY | Facility: MEDICAL CENTER | Age: 52
End: 2022-04-24
Attending: EMERGENCY MEDICINE
Payer: MEDICAID

## 2022-04-24 VITALS
BODY MASS INDEX: 40.75 KG/M2 | SYSTOLIC BLOOD PRESSURE: 113 MMHG | OXYGEN SATURATION: 96 % | HEART RATE: 76 BPM | HEIGHT: 63 IN | TEMPERATURE: 98.3 F | WEIGHT: 230 LBS | RESPIRATION RATE: 16 BRPM | DIASTOLIC BLOOD PRESSURE: 67 MMHG

## 2022-04-24 DIAGNOSIS — N39.0 URINARY TRACT INFECTION WITHOUT HEMATURIA, SITE UNSPECIFIED: ICD-10-CM

## 2022-04-24 DIAGNOSIS — K29.00 ACUTE GASTRITIS WITHOUT HEMORRHAGE, UNSPECIFIED GASTRITIS TYPE: ICD-10-CM

## 2022-04-24 DIAGNOSIS — R10.13 EPIGASTRIC ABDOMINAL PAIN: ICD-10-CM

## 2022-04-24 LAB
ALBUMIN SERPL BCP-MCNC: 4.3 G/DL (ref 3.2–4.9)
ALBUMIN/GLOB SERPL: 1.6 G/DL
ALP SERPL-CCNC: 83 U/L (ref 30–99)
ALT SERPL-CCNC: 12 U/L (ref 2–50)
AMORPH CRY #/AREA URNS HPF: PRESENT /HPF
ANION GAP SERPL CALC-SCNC: 13 MMOL/L (ref 7–16)
APPEARANCE UR: ABNORMAL
AST SERPL-CCNC: 13 U/L (ref 12–45)
BACTERIA #/AREA URNS HPF: NEGATIVE /HPF
BASOPHILS # BLD AUTO: 0.3 % (ref 0–1.8)
BASOPHILS # BLD: 0.05 K/UL (ref 0–0.12)
BILIRUB SERPL-MCNC: 0.4 MG/DL (ref 0.1–1.5)
BILIRUB UR QL STRIP.AUTO: NEGATIVE
BUN SERPL-MCNC: 20 MG/DL (ref 8–22)
CALCIUM SERPL-MCNC: 9.1 MG/DL (ref 8.5–10.5)
CHLORIDE SERPL-SCNC: 106 MMOL/L (ref 96–112)
CO2 SERPL-SCNC: 20 MMOL/L (ref 20–33)
COLOR UR: YELLOW
CREAT SERPL-MCNC: 0.68 MG/DL (ref 0.5–1.4)
EOSINOPHIL # BLD AUTO: 0.01 K/UL (ref 0–0.51)
EOSINOPHIL NFR BLD: 0.1 % (ref 0–6.9)
EPI CELLS #/AREA URNS HPF: ABNORMAL /HPF
ERYTHROCYTE [DISTWIDTH] IN BLOOD BY AUTOMATED COUNT: 43.5 FL (ref 35.9–50)
GFR SERPLBLD CREATININE-BSD FMLA CKD-EPI: 105 ML/MIN/1.73 M 2
GLOBULIN SER CALC-MCNC: 2.7 G/DL (ref 1.9–3.5)
GLUCOSE SERPL-MCNC: 111 MG/DL (ref 65–99)
GLUCOSE UR STRIP.AUTO-MCNC: NEGATIVE MG/DL
HCG SERPL QL: NEGATIVE
HCT VFR BLD AUTO: 50.1 % (ref 37–47)
HGB BLD-MCNC: 16.3 G/DL (ref 12–16)
IMM GRANULOCYTES # BLD AUTO: 0.1 K/UL (ref 0–0.11)
IMM GRANULOCYTES NFR BLD AUTO: 0.5 % (ref 0–0.9)
KETONES UR STRIP.AUTO-MCNC: ABNORMAL MG/DL
LEUKOCYTE ESTERASE UR QL STRIP.AUTO: NEGATIVE
LIPASE SERPL-CCNC: 24 U/L (ref 11–82)
LYMPHOCYTES # BLD AUTO: 0.69 K/UL (ref 1–4.8)
LYMPHOCYTES NFR BLD: 3.6 % (ref 22–41)
MCH RBC QN AUTO: 28.2 PG (ref 27–33)
MCHC RBC AUTO-ENTMCNC: 32.5 G/DL (ref 33.6–35)
MCV RBC AUTO: 86.7 FL (ref 81.4–97.8)
MICRO URNS: ABNORMAL
MONOCYTES # BLD AUTO: 0.71 K/UL (ref 0–0.85)
MONOCYTES NFR BLD AUTO: 3.7 % (ref 0–13.4)
NEUTROPHILS # BLD AUTO: 17.69 K/UL (ref 2–7.15)
NEUTROPHILS NFR BLD: 91.8 % (ref 44–72)
NITRITE UR QL STRIP.AUTO: NEGATIVE
NRBC # BLD AUTO: 0 K/UL
NRBC BLD-RTO: 0 /100 WBC
PH UR STRIP.AUTO: 5 [PH] (ref 5–8)
PLATELET # BLD AUTO: 313 K/UL (ref 164–446)
PMV BLD AUTO: 12.3 FL (ref 9–12.9)
POTASSIUM SERPL-SCNC: 3.7 MMOL/L (ref 3.6–5.5)
PROT SERPL-MCNC: 7 G/DL (ref 6–8.2)
PROT UR QL STRIP: 30 MG/DL
RBC # BLD AUTO: 5.78 M/UL (ref 4.2–5.4)
RBC # URNS HPF: ABNORMAL /HPF
RBC UR QL AUTO: NEGATIVE
SODIUM SERPL-SCNC: 139 MMOL/L (ref 135–145)
SP GR UR STRIP.AUTO: 1.03
UROBILINOGEN UR STRIP.AUTO-MCNC: 0.2 MG/DL
WBC # BLD AUTO: 19.3 K/UL (ref 4.8–10.8)
WBC #/AREA URNS HPF: ABNORMAL /HPF

## 2022-04-24 PROCEDURE — 700111 HCHG RX REV CODE 636 W/ 250 OVERRIDE (IP): Performed by: EMERGENCY MEDICINE

## 2022-04-24 PROCEDURE — 99285 EMERGENCY DEPT VISIT HI MDM: CPT

## 2022-04-24 PROCEDURE — 83690 ASSAY OF LIPASE: CPT

## 2022-04-24 PROCEDURE — A9270 NON-COVERED ITEM OR SERVICE: HCPCS | Performed by: EMERGENCY MEDICINE

## 2022-04-24 PROCEDURE — 85025 COMPLETE CBC W/AUTO DIFF WBC: CPT

## 2022-04-24 PROCEDURE — 36415 COLL VENOUS BLD VENIPUNCTURE: CPT

## 2022-04-24 PROCEDURE — 700117 HCHG RX CONTRAST REV CODE 255: Performed by: EMERGENCY MEDICINE

## 2022-04-24 PROCEDURE — 700102 HCHG RX REV CODE 250 W/ 637 OVERRIDE(OP): Performed by: EMERGENCY MEDICINE

## 2022-04-24 PROCEDURE — 81001 URINALYSIS AUTO W/SCOPE: CPT

## 2022-04-24 PROCEDURE — 96375 TX/PRO/DX INJ NEW DRUG ADDON: CPT

## 2022-04-24 PROCEDURE — 87040 BLOOD CULTURE FOR BACTERIA: CPT

## 2022-04-24 PROCEDURE — 74177 CT ABD & PELVIS W/CONTRAST: CPT

## 2022-04-24 PROCEDURE — 80053 COMPREHEN METABOLIC PANEL: CPT

## 2022-04-24 PROCEDURE — 84703 CHORIONIC GONADOTROPIN ASSAY: CPT

## 2022-04-24 PROCEDURE — 96374 THER/PROPH/DIAG INJ IV PUSH: CPT

## 2022-04-24 RX ORDER — OXYCODONE AND ACETAMINOPHEN 7.5; 325 MG/1; MG/1
TABLET ORAL
COMMUNITY
Start: 2022-04-04

## 2022-04-24 RX ORDER — TIZANIDINE HYDROCHLORIDE 6 MG/1
CAPSULE, GELATIN COATED ORAL
COMMUNITY
Start: 2022-03-22

## 2022-04-24 RX ORDER — CEFTRIAXONE 2 G/1
2 INJECTION, POWDER, FOR SOLUTION INTRAMUSCULAR; INTRAVENOUS ONCE
Status: COMPLETED | OUTPATIENT
Start: 2022-04-24 | End: 2022-04-24

## 2022-04-24 RX ORDER — PANTOPRAZOLE SODIUM 40 MG/1
40 TABLET, DELAYED RELEASE ORAL DAILY
Qty: 30 TABLET | Refills: 0 | Status: SHIPPED | OUTPATIENT
Start: 2022-04-24

## 2022-04-24 RX ORDER — SUCRALFATE 1 G/1
1 TABLET ORAL
Qty: 120 TABLET | Refills: 3 | Status: SHIPPED | OUTPATIENT
Start: 2022-04-24

## 2022-04-24 RX ORDER — ONDANSETRON 2 MG/ML
4 INJECTION INTRAMUSCULAR; INTRAVENOUS ONCE
Status: COMPLETED | OUTPATIENT
Start: 2022-04-24 | End: 2022-04-24

## 2022-04-24 RX ORDER — NITROFURANTOIN 25; 75 MG/1; MG/1
100 CAPSULE ORAL 2 TIMES DAILY
Qty: 10 CAPSULE | Refills: 0 | Status: SHIPPED | OUTPATIENT
Start: 2022-04-24

## 2022-04-24 RX ORDER — ONDANSETRON 4 MG/1
4 TABLET, ORALLY DISINTEGRATING ORAL EVERY 6 HOURS PRN
Qty: 10 TABLET | Refills: 0 | Status: SHIPPED | OUTPATIENT
Start: 2022-04-24

## 2022-04-24 RX ORDER — MORPHINE SULFATE 4 MG/ML
4 INJECTION INTRAVENOUS ONCE
Status: COMPLETED | OUTPATIENT
Start: 2022-04-24 | End: 2022-04-24

## 2022-04-24 RX ORDER — KETOROLAC TROMETHAMINE 30 MG/ML
30 INJECTION, SOLUTION INTRAMUSCULAR; INTRAVENOUS ONCE
Status: COMPLETED | OUTPATIENT
Start: 2022-04-24 | End: 2022-04-24

## 2022-04-24 RX ADMIN — CEFTRIAXONE SODIUM 2 G: 2 INJECTION, POWDER, FOR SOLUTION INTRAMUSCULAR; INTRAVENOUS at 14:18

## 2022-04-24 RX ADMIN — MORPHINE SULFATE 4 MG: 4 INJECTION INTRAVENOUS at 12:44

## 2022-04-24 RX ADMIN — ONDANSETRON 4 MG: 2 INJECTION INTRAMUSCULAR; INTRAVENOUS at 12:44

## 2022-04-24 RX ADMIN — LIDOCAINE HYDROCHLORIDE 30 ML: 20 SOLUTION OROPHARYNGEAL at 12:44

## 2022-04-24 RX ADMIN — IOHEXOL 100 ML: 350 INJECTION, SOLUTION INTRAVENOUS at 13:16

## 2022-04-24 RX ADMIN — KETOROLAC TROMETHAMINE 30 MG: 30 INJECTION, SOLUTION INTRAMUSCULAR at 12:44

## 2022-04-24 NOTE — ED TRIAGE NOTES
Patient BIB by EMS for sudden onset of abdominal pain. States that it starts at her bellybutton and radiates to the right side. Has been experiencing N/V/D as well.

## 2022-04-24 NOTE — ED PROVIDER NOTES
ED Provider  Scribed for Aaron Rubio D.O. by Abimbola Gee. 2022  12:30 PM    Means of arrival: EMS  History obtained from: Patient  History limited by: None    CHIEF COMPLAINT  Chief Complaint   Patient presents with    Abdominal Pain     Patient BIB by EMS for sudden onset of abdominal pain. States that it starts at her bellybutton and radiates to the right side. Has been experiencing N/V/D as well.        HPI  Jayashree Carrasco is a 51 y.o. female who presents to the Emergency Department for evaluation of constant abdominal pain onset 6:00 AM this morning. She experiences associated nausea, vomiting, diarrhea, and chills. She denies associated fever. She denies experiencing this pain before. She has a history of kidney stones, but not kidney infections. She has had a cholecystectomy. She rates the pain a 7/10. No alleviating or exacerbating factors were identified.     REVIEW OF SYSTEMS  See HPI for further details. All other systems are negative.     PAST MEDICAL HISTORY   has a past medical history of Anemia, Anesthesia, Arthritis, ASTHMA, Breath shortness, Cancer (HCC), Chronic low back pain (2014), Chronic neck pain (2014), Fibromyalgia, Flu-like symptoms (2021), GERD (gastroesophageal reflux disease), Infectious disease (2021), Psychiatric problem, Thyroid disease, and Unspecified vitamin D deficiency (2014).    SOCIAL HISTORY  Social History     Tobacco Use    Smoking status: Former Smoker     Years: 20.00     Types: Cigarettes     Quit date: 2017     Years since quittin.3    Smokeless tobacco: Never Used    Tobacco comment: 1 cigarette week   Vaping Use    Vaping Use: Never used   Substance and Sexual Activity    Alcohol use: Not Currently     Alcohol/week: 0.0 oz    Drug use: No    Sexual activity: Yes     Partners: Male     Birth control/protection: Surgical       SURGICAL HISTORY   has a past surgical history that includes thyroidectomy (); abdominal  hysterectomy total; cholecystectomy (2003); upper gi endoscopy,diagnosis (N/A, 6/4/2021); upper gi endoscopy,scler inject (N/A, 6/4/2021); upper gi endoscopy,biopsy (N/A, 6/4/2021); upper gi endoscopy,w/dilat,gastric out (N/A, 6/4/2021); upper gi endoscopy,diagnosis (1/28/2022); upper gi endoscopy,biopsy (1/28/2022); and upper gi endoscopy,w/dilat,gastric out (1/28/2022).    CURRENT MEDICATIONS  Home Medications       Reviewed by Licha Cordon R.N. (Registered Nurse) on 04/24/22 at 1224  Med List Status: <None>     Medication Last Dose Status   ACETAMINOPHEN EXTRA STRENGTH 500 MG Tab  Active   albuterol (PROVENTIL) 2.5mg/3ml Nebu Soln solution for nebulization  Active   albuterol 108 (90 Base) MCG/ACT Aero Soln inhalation aerosol  Active   albuterol 108 (90 Base) MCG/ACT Aero Soln inhalation aerosol  Active   albuterol 108 (90 Base) MCG/ACT Aero Soln inhalation aerosol  Active   Cholecalciferol (VITAMIN D3) 1.25 MG (11197 UT) Cap  Active   FLOVENT  MCG/ACT Aerosol  Active   fluticasone (FLONASE) 50 MCG/ACT nasal spray  Active   levothyroxine (SYNTHROID) 137 MCG Tab  Active   lidocaine (XYLOCAINE) 5 % Ointment  Active   metroNIDAZOLE (METROGEL-VAGINAL) 0.75 % Gel  Active   NON SPECIFIED  Active   nortriptyline (PAMELOR) 10 MG Cap  Active   omeprazole (PRILOSEC) 40 MG delayed-release capsule  Active   oxyCODONE-acetaminophen (PERCOCET) 7.5-325 MG per tablet  Active   promethazine-dextromethorphan (PROMETHAZINE-DM) 6.25-15 MG/5ML syrup  Active   sucralfate (CARAFATE) 1 GM Tab  Active   sucralfate (CARAFATE) 1 GM Tab  Active   tizanidine (ZANAFLEX) 6 MG capsule  Active   vitamin D2, Ergocalciferol, (DRISDOL) 1.25 MG (00626 UT) Cap capsule  Active                    ALLERGIES  Allergies   Allergen Reactions    Propofol      Hypotension, and severe asthma attack    Ciprofloxacin Rash     Rxn - about 2012 Hives, nausea     Sulfa Drugs Rash     Rxn - about 2012 Hives, nausea     Seasonal        PHYSICAL  "EXAM  VITAL SIGNS: /67   Pulse 76   Temp 36.8 °C (98.2 °F) (Temporal)   Ht 1.6 m (5' 3\")   Wt 104 kg (230 lb)   LMP 11/21/2015   SpO2 94%   BMI 40.74 kg/m²   Constitutional: Alert in no apparent distress.  HENT: No signs of trauma, mucous membranes are moist  Eyes: Conjunctiva normal, Non-icteric.   Neck: Normal range of motion, No tenderness, Supple.  Lymphatic: No lymphadenopathy noted.   Cardiovascular: Regular rate and rhythm, no murmurs.   Thorax & Lungs: Normal breath sounds, No respiratory distress, No wheezing, No chest tenderness.   Abdomen: Epigastric and right upper quadrant tenderness. BMI greater than 30. Bowel sounds normal, Soft, No masses, No pulsatile masses. No peritoneal signs.  Skin: Warm, Dry, normal color.   Back: No bony tenderness, No CVA tenderness.   Extremities: No edema, No tenderness, No cyanosis  Musculoskeletal: Good range of motion in all major joints. No tenderness to palpation or major deformities noted.   Neurologic: Alert and oriented x4, Normal motor function, Normal sensory function, No focal deficits noted.   Psychiatric: Affect normal, Judgment normal, Mood normal.     DIAGNOSTIC STUDIES / PROCEDURES    LABS  Results for orders placed or performed during the hospital encounter of 04/24/22   CBC WITH DIFFERENTIAL   Result Value Ref Range    WBC 19.3 (H) 4.8 - 10.8 K/uL    RBC 5.78 (H) 4.20 - 5.40 M/uL    Hemoglobin 16.3 (H) 12.0 - 16.0 g/dL    Hematocrit 50.1 (H) 37.0 - 47.0 %    MCV 86.7 81.4 - 97.8 fL    MCH 28.2 27.0 - 33.0 pg    MCHC 32.5 (L) 33.6 - 35.0 g/dL    RDW 43.5 35.9 - 50.0 fL    Platelet Count 313 164 - 446 K/uL    MPV 12.3 9.0 - 12.9 fL    Neutrophils-Polys 91.80 (H) 44.00 - 72.00 %    Lymphocytes 3.60 (L) 22.00 - 41.00 %    Monocytes 3.70 0.00 - 13.40 %    Eosinophils 0.10 0.00 - 6.90 %    Basophils 0.30 0.00 - 1.80 %    Immature Granulocytes 0.50 0.00 - 0.90 %    Nucleated RBC 0.00 /100 WBC    Neutrophils (Absolute) 17.69 (H) 2.00 - 7.15 K/uL    " Lymphs (Absolute) 0.69 (L) 1.00 - 4.80 K/uL    Monos (Absolute) 0.71 0.00 - 0.85 K/uL    Eos (Absolute) 0.01 0.00 - 0.51 K/uL    Baso (Absolute) 0.05 0.00 - 0.12 K/uL    Immature Granulocytes (abs) 0.10 0.00 - 0.11 K/uL    NRBC (Absolute) 0.00 K/uL   HCG QUAL SERUM   Result Value Ref Range    Beta-Hcg Qualitative Serum Negative Negative   URINALYSIS,CULTURE IF INDICATED    Specimen: Urine, Clean Catch   Result Value Ref Range    Color Yellow     Character Turbid (A)     Specific Gravity 1.029 <1.035    Ph 5.0 5.0 - 8.0    Glucose Negative Negative mg/dL    Ketones Trace (A) Negative mg/dL    Protein 30 (A) Negative mg/dL    Bilirubin Negative Negative    Urobilinogen, Urine 0.2 Negative    Nitrite Negative Negative    Leukocyte Esterase Negative Negative    Occult Blood Negative Negative    Micro Urine Req Microscopic    URINE MICROSCOPIC (W/UA)   Result Value Ref Range    WBC 0-2 /hpf    RBC 2-5 (A) /hpf    Bacteria Negative None /hpf    Epithelial Cells Moderate (A) /hpf    Amorphous Crystal Present /hpf   Comp Metabolic Panel   Result Value Ref Range    Sodium 139 135 - 145 mmol/L    Potassium 3.7 3.6 - 5.5 mmol/L    Chloride 106 96 - 112 mmol/L    Co2 20 20 - 33 mmol/L    Anion Gap 13.0 7.0 - 16.0    Glucose 111 (H) 65 - 99 mg/dL    Bun 20 8 - 22 mg/dL    Creatinine 0.68 0.50 - 1.40 mg/dL    Calcium 9.1 8.5 - 10.5 mg/dL    AST(SGOT) 13 12 - 45 U/L    ALT(SGPT) 12 2 - 50 U/L    Alkaline Phosphatase 83 30 - 99 U/L    Total Bilirubin 0.4 0.1 - 1.5 mg/dL    Albumin 4.3 3.2 - 4.9 g/dL    Total Protein 7.0 6.0 - 8.2 g/dL    Globulin 2.7 1.9 - 3.5 g/dL    A-G Ratio 1.6 g/dL   LIPASE   Result Value Ref Range    Lipase 24 11 - 82 U/L   ESTIMATED GFR   Result Value Ref Range    GFR (CKD-EPI) 105 >60 mL/min/1.73 m 2      All labs reviewed by me.    RADIOLOGY  CT-ABDOMEN-PELVIS WITH   Final Result         1. Wall thickening of the stomach antrum could relate to gastritis.   2. Normal appendix. No inflammatory change in the  right lower quadrant.        The radiologist's interpretations of all radiological studies have been reviewed by me.    Films have been independently by me      COURSE  Pertinent Labs & Imaging studies reviewed. (See chart for details)    12:30 PM - Patient seen and examined at bedside. Discussed plan of care. The patient will be medicated with morphine 4 mg, GI cocktail, Zofran 4 mg, and Toradol 30 mg. Ordered for CT-Abdomen, UA, HCG Qual, Lipase, CMP, and CBC w/ Diff to evaluate her symptoms.     1:00 PM - The patient's urine sample appears cloudy and labs show that the patient's white blood cell count is 19. Antibiotics will be initiated at this time. Patient will be treated with Rocephin 2 mg.     2:04 PM - Patient was reevaluated at bedside. The patient's abdominal pain has resolved and her abdominal exam is normal. She informed me that she has history of gastritis and has been out of her Protonix for two weeks. Discussed lab and radiology results with the patient, as outlined above. Patient had the opportunity to ask any questions. The plan for discharge was discussed with them and she was told to return for any new or worsening symptoms. She was also informed of the plans for follow up. Patient is understanding and agreeable to the plan for discharge.       MEDICAL DECISION MAKING  This is a 51 y.o. female who presents with symptoms as described above.  Her urine sample looked purulent, so empiric antibiotics were given.  Her urinalysis does not show infection but she is being placed on antibiotics due to the appearance of the urine, cultures are pending.    Her CT scan shows gastritis.  She was medicated with GI cocktail and pain medications with good results.    She has a history of gastritis and is supposed be taking Protonix has been off it for 2 weeks.  She will be given a new prescription for Protonix and hopefully she can get this filled and she will also be placed on Carafate which will cause  improvement in symptoms.    She will be given antinausea medications also.    The patient will return for new or worsening symptoms and is stable at the time of discharge.    The patient is referred to a primary physician for blood pressure management, diabetic screening, and for all other preventative health concerns.    DISPOSITION:  Patient will be discharged home in stable condition.    FOLLOW UP:    Please follow-up with your primary prescriber for all other prescription refills.  In 1 week      OUTPATIENT MEDICATIONS:  Discharge Medication List as of 4/24/2022  2:13 PM        START taking these medications    Details   pantoprazole (PROTONIX) 40 MG Tablet Delayed Response Take 1 Tablet by mouth every day., Disp-30 Tablet, R-0, Normal      !! sucralfate (CARAFATE) 1 GM Tab Take 1 Tablet by mouth 4 Times a Day,Before Meals and at Bedtime., Disp-120 Tablet, R-3, Normal      nitrofurantoin (MACROBID) 100 MG Cap Take 1 Capsule by mouth 2 times a day., Disp-10 Capsule, R-0, Normal       !! - Potential duplicate medications found. Please discuss with provider.           FINAL IMPRESSION  1. Epigastric abdominal pain    2. Acute gastritis without hemorrhage, unspecified gastritis type    3. Urinary tract infection without hematuria, site unspecified         Abimbola RODRIGUEZ (Danoibdenisha), am scribing for, and in the presence of, Aaron Rubio D.O..    Electronically signed by: Abimbola Gee (Scribe), 4/24/2022    IAaron D.O. personally performed the services described in this documentation, as scribed by Abimbola Gee in my presence, and it is both accurate and complete. C.     The note accurately reflects work and decisions made by me.  Aaron Rubio D.O.  4/24/2022  6:37 PM

## 2022-04-24 NOTE — DISCHARGE INSTRUCTIONS
CT scan shows gastritis.  You have been out of your Protonix.  You will be given a new prescription for Protonix, you also be given Carafate which tends to coat the stomach and reduce your symptoms.    Your urine test looked purulent/likely infection right ear lab test did not confirm this you will be treated with an antibiotic in case there is infection not being seen.    Please follow-up with your primary physician.

## 2022-04-24 NOTE — ED NOTES
Pt given discharge instructions/prescriptions sent to pharm of choosing/ home care instructions explained, pt verbalized understanding of instructions given/pt understands the importance of follow up, pt ambulatory to lobby.

## 2022-04-29 LAB
BACTERIA BLD CULT: NORMAL
BACTERIA BLD CULT: NORMAL
SIGNIFICANT IND 70042: NORMAL
SIGNIFICANT IND 70042: NORMAL
SITE SITE: NORMAL
SITE SITE: NORMAL
SOURCE SOURCE: NORMAL
SOURCE SOURCE: NORMAL

## 2022-05-16 ENCOUNTER — APPOINTMENT (OUTPATIENT)
Dept: MEDICAL GROUP | Facility: CLINIC | Age: 52
End: 2022-05-16
Payer: MEDICAID

## 2022-05-23 ENCOUNTER — OFFICE VISIT (OUTPATIENT)
Dept: MEDICAL GROUP | Facility: CLINIC | Age: 52
End: 2022-05-23
Payer: MEDICAID

## 2022-05-23 ENCOUNTER — APPOINTMENT (OUTPATIENT)
Dept: RADIOLOGY | Facility: CLINIC | Age: 52
End: 2022-05-23
Attending: STUDENT IN AN ORGANIZED HEALTH CARE EDUCATION/TRAINING PROGRAM
Payer: MEDICAID

## 2022-05-23 VITALS
HEART RATE: 85 BPM | TEMPERATURE: 97.3 F | HEIGHT: 63 IN | WEIGHT: 226 LBS | DIASTOLIC BLOOD PRESSURE: 66 MMHG | OXYGEN SATURATION: 95 % | RESPIRATION RATE: 16 BRPM | SYSTOLIC BLOOD PRESSURE: 109 MMHG | BODY MASS INDEX: 40.04 KG/M2

## 2022-05-23 DIAGNOSIS — K21.9 GASTROESOPHAGEAL REFLUX DISEASE, UNSPECIFIED WHETHER ESOPHAGITIS PRESENT: ICD-10-CM

## 2022-05-23 DIAGNOSIS — D17.22 LIPOMA OF LEFT UPPER EXTREMITY: ICD-10-CM

## 2022-05-23 DIAGNOSIS — D72.829 LEUKOCYTOSIS, UNSPECIFIED TYPE: ICD-10-CM

## 2022-05-23 DIAGNOSIS — R35.0 URINARY FREQUENCY: ICD-10-CM

## 2022-05-23 DIAGNOSIS — R32 URINARY INCONTINENCE, UNSPECIFIED TYPE: ICD-10-CM

## 2022-05-23 DIAGNOSIS — G89.29 CHRONIC LOW BACK PAIN WITHOUT SCIATICA, UNSPECIFIED BACK PAIN LATERALITY: ICD-10-CM

## 2022-05-23 DIAGNOSIS — K20.0 EOSINOPHILIC ESOPHAGITIS: ICD-10-CM

## 2022-05-23 DIAGNOSIS — Z13.1 SCREENING FOR DIABETES MELLITUS: ICD-10-CM

## 2022-05-23 DIAGNOSIS — M54.50 CHRONIC LOW BACK PAIN WITHOUT SCIATICA, UNSPECIFIED BACK PAIN LATERALITY: ICD-10-CM

## 2022-05-23 DIAGNOSIS — R30.0 DYSURIA: ICD-10-CM

## 2022-05-23 LAB
APPEARANCE UR: CLEAR
BILIRUB UR STRIP-MCNC: NORMAL MG/DL
COLOR UR AUTO: NORMAL
GLUCOSE UR STRIP.AUTO-MCNC: NORMAL MG/DL
HBA1C MFR BLD: 5.5 % (ref 0–5.6)
INT CON NEG: NORMAL
INT CON POS: NORMAL
KETONES UR STRIP.AUTO-MCNC: NORMAL MG/DL
LEUKOCYTE ESTERASE UR QL STRIP.AUTO: NORMAL
NITRITE UR QL STRIP.AUTO: NORMAL
PH UR STRIP.AUTO: 5.5 [PH] (ref 5–8)
PROT UR QL STRIP: NORMAL MG/DL
RBC UR QL AUTO: NORMAL
SP GR UR STRIP.AUTO: 1.03
UROBILINOGEN UR STRIP-MCNC: 0.2 MG/DL

## 2022-05-23 PROCEDURE — 72070 X-RAY EXAM THORAC SPINE 2VWS: CPT | Mod: TC | Performed by: FAMILY MEDICINE

## 2022-05-23 PROCEDURE — 83036 HEMOGLOBIN GLYCOSYLATED A1C: CPT | Performed by: STUDENT IN AN ORGANIZED HEALTH CARE EDUCATION/TRAINING PROGRAM

## 2022-05-23 PROCEDURE — 81002 URINALYSIS NONAUTO W/O SCOPE: CPT | Performed by: STUDENT IN AN ORGANIZED HEALTH CARE EDUCATION/TRAINING PROGRAM

## 2022-05-23 PROCEDURE — 99214 OFFICE O/P EST MOD 30 MIN: CPT | Mod: 25,GC | Performed by: STUDENT IN AN ORGANIZED HEALTH CARE EDUCATION/TRAINING PROGRAM

## 2022-05-23 PROCEDURE — 72100 X-RAY EXAM L-S SPINE 2/3 VWS: CPT | Mod: TC | Performed by: FAMILY MEDICINE

## 2022-05-23 RX ORDER — PANTOPRAZOLE SODIUM 20 MG/1
20 TABLET, DELAYED RELEASE ORAL DAILY
Qty: 90 TABLET | Refills: 3 | Status: SHIPPED | OUTPATIENT
Start: 2022-05-23

## 2022-05-23 ASSESSMENT — FIBROSIS 4 INDEX: FIB4 SCORE: 0.61

## 2022-05-23 NOTE — ASSESSMENT & PLAN NOTE
Unclear etiology. UA in clinic negative for blood, so kidney stone unlikely. UA also negative for infection, so pyelo unlikely. No rash so shingles unlikely. CT abdomen in ED was largely unremarkable. Lumbar and thoracic plain films today. Awaiting read but on initial look did not look concerning. Possible that gastritis/ GERD and possible incontinence are playing a role. Will attempt treatment of those and see if back pain persists. Can always consider physical therapy.

## 2022-05-23 NOTE — ASSESSMENT & PLAN NOTE
Left upper extremity, close to left axilla. Had ultrasound in 2020 that showed it was a lipoma. Patient wants it removed.    Referral to Gen Surgery

## 2022-05-23 NOTE — PROGRESS NOTES
Subjective:     CC: follow-up on multiple concerns    HPI:   Jayashree presents today with multiple concerns.     Her primary concern is urinary frequency and dysuria. She notes that she is going to the bathroom more frequently than she used to. She notes associated burning with urination. She denies fever or chills. She denies blood in her urine. She has some associated leaking and incontinence, not able to make it to the bathroom in time. She has been seen by [facility] in the past. She has been treated with two courses of Macrobid since April of this year.     She also has been having back pain. It is right sided, in her mid-low back. Denies any injury or trauma to the area. Nothing really makes it better or worse. No radiating pain. Not worsened with activity. She has not tried any remedies.     She also has abdominal pain and was seen in the ED for this in April. She had CT abdomen that showed possible gastritis. She was treated with Macrobid, Carafate and Protonix. Since her visit to the ED, she notes that her abdominal pain is unchanged. No aggravating or alleviating factors. Carafate did not help. She has not been taking Protonix.     She also is wanting to get referral back to GI for history of eosinophilic esophagitis.     She is also wanting referral back to General Surgery because of mass on her left upper extremity.     Problem   Leukocytosis    Elevated WBC in ED in April     Urinary Incontinence   Screening for Diabetes Mellitus   Dysuria   Lipoma   Urinary Frequency   Gerd (Gastroesophageal Reflux Disease)   Chronic Low Back Pain               Current Outpatient Medications Ordered in Epic   Medication Sig Dispense Refill   • pantoprazole (PROTONIX) 20 MG tablet Take 1 Tablet by mouth every day. 90 Tablet 3   • levothyroxine (SYNTHROID) 137 MCG Tab TAKE 1 TABLET BY MOUTH TWICE DAILY 96 Tablet 1   • oxyCODONE-acetaminophen (PERCOCET) 7.5-325 MG per tablet      • tizanidine (ZANAFLEX) 6 MG capsule  TAKE 1 CAPSULE BY MOUTH EVERY 12 HOURS AS NEEDED FOR MUSCLE SPASM     • pantoprazole (PROTONIX) 40 MG Tablet Delayed Response Take 1 Tablet by mouth every day. 30 Tablet 0   • sucralfate (CARAFATE) 1 GM Tab Take 1 Tablet by mouth 4 Times a Day,Before Meals and at Bedtime. 120 Tablet 3   • nitrofurantoin (MACROBID) 100 MG Cap Take 1 Capsule by mouth 2 times a day. 10 Capsule 0   • ondansetron (ZOFRAN ODT) 4 MG TABLET DISPERSIBLE Take 1 Tablet by mouth every 6 hours as needed for Nausea. 10 Tablet 0   • albuterol 108 (90 Base) MCG/ACT Aero Soln inhalation aerosol Inhale 2 Puffs every four hours as needed for Shortness of Breath. 8.5 g 4   • fluticasone (FLONASE) 50 MCG/ACT nasal spray Administer 1 Spray into affected nostril(S) every day. 9.9 mL 4   • FLOVENT  MCG/ACT Aerosol SWALLOW 4 PUFFS TWICE DAILY FOR 8 WEEKS     • albuterol 108 (90 Base) MCG/ACT Aero Soln inhalation aerosol Inhale 2 Puffs every 6 hours as needed for Shortness of Breath. 8.5 g 0   • albuterol (PROVENTIL) 2.5mg/3ml Nebu Soln solution for nebulization Take 3 mL by nebulization every four hours as needed for Shortness of Breath. 3 mL 0   • promethazine-dextromethorphan (PROMETHAZINE-DM) 6.25-15 MG/5ML syrup Take 5 mL by mouth every four hours as needed. 120 mL 0   • omeprazole (PRILOSEC) 40 MG delayed-release capsule Take 1 Capsule by mouth 2 times a day. 180 Capsule 3   • sucralfate (CARAFATE) 1 GM Tab Take 1 Tablet by mouth 4 Times a Day,Before Meals and at Bedtime. 120 Tablet 3   • ACETAMINOPHEN EXTRA STRENGTH 500 MG Tab      • Cholecalciferol (VITAMIN D3) 1.25 MG (18661 UT) Cap Take 1 Capsule by mouth every 7 days.     • metroNIDAZOLE (METROGEL-VAGINAL) 0.75 % Gel INSERT 1 APPLICATORFUL VAGINALLY AT BEDTIME FOR 5 DAYS. (Patient not taking: Reported on 2/13/2022)     • vitamin D2, Ergocalciferol, (DRISDOL) 1.25 MG (00770 UT) Cap capsule Take 1 Capsule by mouth every 7 days. 12 Capsule 3   • lidocaine (XYLOCAINE) 5 % Ointment      •  "sucralfate (CARAFATE) 1 GM Tab      • albuterol 108 (90 Base) MCG/ACT Aero Soln inhalation aerosol Inhale 2 Puffs every 6 hours as needed for Shortness of Breath. 8.5 g 0   • NON SPECIFIED amberen - menapause supplement (Patient not taking: Reported on 2/13/2022)     • nortriptyline (PAMELOR) 10 MG Cap Take 10 mg by mouth every bedtime.  0     No current [facility]-ordered facility-administered medications on file.     ROS:  Gen: no fevers/chills  Eyes: no changes in vision  ENT: no sore throat  Pulm: no sob  CV: no chest pain  GI: no nausea/vomiting  : yes dysuria  MSk: no myalgias  Skin: no rash      Objective:     Exam:  /66 (BP Location: Left arm, Patient Position: Sitting, BP Cuff Size: Adult)   Pulse 85   Temp 36.3 °C (97.3 °F) (Temporal)   Resp 16   Ht 1.6 m (5' 3\")   Wt 103 kg (226 lb)   LMP 11/21/2015   SpO2 95%   BMI 40.03 kg/m²  Body mass index is 40.03 kg/m².    Gen: Alert and oriented, No apparent distress.  Neck: Neck is supple without lymphadenopathy.  Lungs: Normal effort, CTA bilaterally, no wheezes, rhonchi, or rales  CV: Regular rate and rhythm. No murmurs, rubs, or gallops.  Ext: No clubbing, cyanosis, edema.  Abd: soft, nontender, nondistended, obese  Msk: mild tenderness to palpation on mid back, negative CVA tenderness, no rash    Assessment & Plan:     51 y.o. female with the following -     Problem List Items Addressed This Visit     Eosinophilic esophagitis (Chronic)    Relevant Orders    REFERRAL TO GASTROENTEROLOGY    Chronic low back pain     Unclear etiology. UA in clinic negative for blood, so kidney stone unlikely. UA also negative for infection, so pyelo unlikely. No rash so shingles unlikely. CT abdomen in ED was largely unremarkable. Lumbar and thoracic plain films today. Awaiting read but on initial look did not look concerning. Possible that gastritis/ GERD and possible incontinence are playing a role. Will attempt treatment of those and see if back pain persists. Can " always consider physical therapy.            Relevant Orders    DX-LUMBAR SPINE-2 OR 3 VIEWS    DX-THORACIC SPINE-2 VIEWS    GERD (gastroesophageal reflux disease)     Rx for pantoprazole. Patient has tried omeprazole and got no relief. Recommend avoiding trigger foods and staying upright after meals.            Relevant Medications    pantoprazole (PROTONIX) 20 MG tablet    Lipoma     Left upper extremity, close to left axilla. Had ultrasound in 2020 that showed it was a lipoma. Patient wants it removed.    Referral to Gen Surgery           Relevant Orders    REFERRAL TO GENERAL SURGERY    Urinary frequency     She has leaked urine. She sometimes has trouble making it to the bathroom in time. Sounds like urge incontinence.     Checked a1c in clinic and was 5.5%    No glucose in urine.     No signs of infection on UA.     Plan for urology referral for scope. She has seen [facility] in the past and will send referral there.     Recommend voiding diary, weight loss, decrease fluids after 6-7pm, avoiding caffeine/ tea/ alcohol, weight loss           Screening for diabetes mellitus     a1c 5.5% in clinic           Relevant Orders    POCT A1C (Completed)    Dysuria     ua in clinic negative for infection           Relevant Orders    POCT URINALYSIS (Completed)    REFERRAL TO UROLOGY    Leukocytosis     Repeat CBC           Relevant Orders    CBC WITH DIFFERENTIAL    Urinary incontinence    Relevant Orders    REFERRAL TO UROLOGY        Monitoring multiple problems, would like 6 week follow-up to ensure all are on the right track    No follow-ups on file.

## 2022-05-23 NOTE — ASSESSMENT & PLAN NOTE
She has leaked urine. She sometimes has trouble making it to the bathroom in time. Sounds like urge incontinence.     Checked a1c in clinic and was 5.5%    No glucose in urine.     No signs of infection on UA.     Plan for urology referral for scope. She has seen [facility] in the past and will send referral there.     Recommend voiding diary, weight loss, decrease fluids after 6-7pm, avoiding caffeine/ tea/ alcohol, weight loss

## 2022-05-23 NOTE — ASSESSMENT & PLAN NOTE
Rx for pantoprazole. Patient has tried omeprazole and got no relief. Recommend avoiding trigger foods and staying upright after meals.

## 2022-06-17 ENCOUNTER — HOSPITAL ENCOUNTER (OUTPATIENT)
Dept: LAB | Facility: MEDICAL CENTER | Age: 52
End: 2022-06-17
Attending: STUDENT IN AN ORGANIZED HEALTH CARE EDUCATION/TRAINING PROGRAM
Payer: MEDICAID

## 2022-06-17 ENCOUNTER — HOSPITAL ENCOUNTER (OUTPATIENT)
Dept: LAB | Facility: MEDICAL CENTER | Age: 52
End: 2022-06-17
Attending: NURSE PRACTITIONER
Payer: MEDICAID

## 2022-06-17 DIAGNOSIS — E55.9 VITAMIN D DEFICIENCY: ICD-10-CM

## 2022-06-17 DIAGNOSIS — E89.0 POSTOPERATIVE HYPOTHYROIDISM: ICD-10-CM

## 2022-06-17 LAB
25(OH)D3 SERPL-MCNC: 56 NG/ML (ref 30–100)
ALBUMIN SERPL BCP-MCNC: 4.6 G/DL (ref 3.2–4.9)
ALBUMIN/GLOB SERPL: 1.6 G/DL
ALP SERPL-CCNC: 95 U/L (ref 30–99)
ALT SERPL-CCNC: 15 U/L (ref 2–50)
ANION GAP SERPL CALC-SCNC: 11 MMOL/L (ref 7–16)
AST SERPL-CCNC: 13 U/L (ref 12–45)
BASOPHILS # BLD AUTO: 0.5 % (ref 0–1.8)
BASOPHILS # BLD: 0.05 K/UL (ref 0–0.12)
BILIRUB SERPL-MCNC: 0.4 MG/DL (ref 0.1–1.5)
BUN SERPL-MCNC: 15 MG/DL (ref 8–22)
CALCIUM SERPL-MCNC: 9.3 MG/DL (ref 8.5–10.5)
CHLORIDE SERPL-SCNC: 104 MMOL/L (ref 96–112)
CO2 SERPL-SCNC: 23 MMOL/L (ref 20–33)
CREAT SERPL-MCNC: 0.68 MG/DL (ref 0.5–1.4)
EOSINOPHIL # BLD AUTO: 0.17 K/UL (ref 0–0.51)
EOSINOPHIL NFR BLD: 1.6 % (ref 0–6.9)
ERYTHROCYTE [DISTWIDTH] IN BLOOD BY AUTOMATED COUNT: 50.9 FL (ref 35.9–50)
EST. AVERAGE GLUCOSE BLD GHB EST-MCNC: 105 MG/DL
GFR SERPLBLD CREATININE-BSD FMLA CKD-EPI: 105 ML/MIN/1.73 M 2
GLOBULIN SER CALC-MCNC: 2.9 G/DL (ref 1.9–3.5)
GLUCOSE SERPL-MCNC: 97 MG/DL (ref 65–99)
HBA1C MFR BLD: 5.3 % (ref 4–5.6)
HCT VFR BLD AUTO: 47.7 % (ref 37–47)
HGB BLD-MCNC: 15.3 G/DL (ref 12–16)
IMM GRANULOCYTES # BLD AUTO: 0.04 K/UL (ref 0–0.11)
IMM GRANULOCYTES NFR BLD AUTO: 0.4 % (ref 0–0.9)
LYMPHOCYTES # BLD AUTO: 2.64 K/UL (ref 1–4.8)
LYMPHOCYTES NFR BLD: 25.6 % (ref 22–41)
MCH RBC QN AUTO: 29.3 PG (ref 27–33)
MCHC RBC AUTO-ENTMCNC: 32.1 G/DL (ref 33.6–35)
MCV RBC AUTO: 91.2 FL (ref 81.4–97.8)
MONOCYTES # BLD AUTO: 0.61 K/UL (ref 0–0.85)
MONOCYTES NFR BLD AUTO: 5.9 % (ref 0–13.4)
NEUTROPHILS # BLD AUTO: 6.8 K/UL (ref 2–7.15)
NEUTROPHILS NFR BLD: 66 % (ref 44–72)
NRBC # BLD AUTO: 0 K/UL
NRBC BLD-RTO: 0 /100 WBC
PLATELET # BLD AUTO: 311 K/UL (ref 164–446)
PMV BLD AUTO: 10.3 FL (ref 9–12.9)
POTASSIUM SERPL-SCNC: 4.3 MMOL/L (ref 3.6–5.5)
PROT SERPL-MCNC: 7.5 G/DL (ref 6–8.2)
RBC # BLD AUTO: 5.23 M/UL (ref 4.2–5.4)
SODIUM SERPL-SCNC: 138 MMOL/L (ref 135–145)
T3FREE SERPL-MCNC: 2.68 PG/ML (ref 2–4.4)
T4 FREE SERPL-MCNC: 1.74 NG/DL (ref 0.93–1.7)
TSH SERPL DL<=0.005 MIU/L-ACNC: <0.005 UIU/ML (ref 0.38–5.33)
VIT B12 SERPL-MCNC: 439 PG/ML (ref 211–911)
WBC # BLD AUTO: 10.3 K/UL (ref 4.8–10.8)

## 2022-06-17 PROCEDURE — 82607 VITAMIN B-12: CPT

## 2022-06-17 PROCEDURE — 85025 COMPLETE CBC W/AUTO DIFF WBC: CPT

## 2022-06-17 PROCEDURE — 82306 VITAMIN D 25 HYDROXY: CPT

## 2022-06-17 PROCEDURE — 84439 ASSAY OF FREE THYROXINE: CPT

## 2022-06-17 PROCEDURE — 84481 FREE ASSAY (FT-3): CPT

## 2022-06-17 PROCEDURE — 84443 ASSAY THYROID STIM HORMONE: CPT

## 2022-06-17 PROCEDURE — 36415 COLL VENOUS BLD VENIPUNCTURE: CPT

## 2022-06-17 PROCEDURE — 83036 HEMOGLOBIN GLYCOSYLATED A1C: CPT

## 2022-06-17 PROCEDURE — 80053 COMPREHEN METABOLIC PANEL: CPT

## 2022-07-17 ENCOUNTER — HOSPITAL ENCOUNTER (OUTPATIENT)
Facility: MEDICAL CENTER | Age: 52
End: 2022-07-17
Attending: NURSE PRACTITIONER
Payer: MEDICAID

## 2022-07-17 ENCOUNTER — OFFICE VISIT (OUTPATIENT)
Dept: URGENT CARE | Facility: CLINIC | Age: 52
End: 2022-07-17
Payer: MEDICAID

## 2022-07-17 VITALS
WEIGHT: 228 LBS | RESPIRATION RATE: 14 BRPM | TEMPERATURE: 97.9 F | HEIGHT: 63 IN | BODY MASS INDEX: 40.4 KG/M2 | SYSTOLIC BLOOD PRESSURE: 114 MMHG | DIASTOLIC BLOOD PRESSURE: 76 MMHG | OXYGEN SATURATION: 98 % | HEART RATE: 98 BPM

## 2022-07-17 DIAGNOSIS — R82.90 ABNORMAL URINE ODOR: ICD-10-CM

## 2022-07-17 DIAGNOSIS — S86.911A STRAIN OF RIGHT KNEE, INITIAL ENCOUNTER: ICD-10-CM

## 2022-07-17 DIAGNOSIS — R10.9 RIGHT FLANK PAIN: ICD-10-CM

## 2022-07-17 DIAGNOSIS — Z87.440 HISTORY OF KIDNEY INFECTION: ICD-10-CM

## 2022-07-17 DIAGNOSIS — R11.0 NAUSEA: ICD-10-CM

## 2022-07-17 LAB
APPEARANCE UR: NORMAL
BILIRUB UR STRIP-MCNC: NEGATIVE MG/DL
COLOR UR AUTO: NORMAL
GLUCOSE UR STRIP.AUTO-MCNC: NEGATIVE MG/DL
KETONES UR STRIP.AUTO-MCNC: NEGATIVE MG/DL
LEUKOCYTE ESTERASE UR QL STRIP.AUTO: NEGATIVE
NITRITE UR QL STRIP.AUTO: NEGATIVE
PH UR STRIP.AUTO: 6 [PH] (ref 5–8)
PROT UR QL STRIP: NEGATIVE MG/DL
RBC UR QL AUTO: NEGATIVE
SP GR UR STRIP.AUTO: 1.03
UROBILINOGEN UR STRIP-MCNC: 0.2 MG/DL

## 2022-07-17 PROCEDURE — 99214 OFFICE O/P EST MOD 30 MIN: CPT | Performed by: NURSE PRACTITIONER

## 2022-07-17 PROCEDURE — 81002 URINALYSIS NONAUTO W/O SCOPE: CPT | Performed by: NURSE PRACTITIONER

## 2022-07-17 PROCEDURE — 87086 URINE CULTURE/COLONY COUNT: CPT

## 2022-07-17 RX ORDER — PREDNISONE 20 MG/1
TABLET ORAL
Qty: 11 TABLET | Refills: 0 | Status: SHIPPED | OUTPATIENT
Start: 2022-07-17

## 2022-07-17 RX ORDER — PROMETHAZINE HYDROCHLORIDE 25 MG/1
25 TABLET ORAL EVERY 6 HOURS PRN
Qty: 30 TABLET | Refills: 0 | Status: SHIPPED | OUTPATIENT
Start: 2022-07-17

## 2022-07-17 RX ORDER — CEFDINIR 300 MG/1
300 CAPSULE ORAL 2 TIMES DAILY
Qty: 14 CAPSULE | Refills: 0 | Status: SHIPPED | OUTPATIENT
Start: 2022-07-17 | End: 2022-07-24

## 2022-07-17 ASSESSMENT — VISUAL ACUITY: OU: 1

## 2022-07-17 ASSESSMENT — FIBROSIS 4 INDEX: FIB4 SCORE: 0.56

## 2022-07-17 ASSESSMENT — ENCOUNTER SYMPTOMS
BACK PAIN: 1
FEVER: 0
NAUSEA: 1
CONSTITUTIONAL NEGATIVE: 1

## 2022-07-17 NOTE — PROGRESS NOTES
Subjective:     Jayashree Carrasco is a 52 y.o. female who presents for Low Back Pain (X 2 weeks with odorous Urine, urgency and frequency. ) and Knee Pain ((R) x 1.5 weeks ago.  )       Knee Pain  This is a new problem. The problem has been gradually worsening. Associated symptoms include nausea. Pertinent negatives include no fever.   Back Pain  This is a new problem. The problem has been gradually worsening since onset. Pertinent negatives include no fever.     Patient reports 2 weeks ago, she started to experience right lower back pain which wraps around her flank.  Reports pain feels deep and doubts it is muscular in nature.  Denies injury.  Reports frequent urination with small amounts of urine.  Reports a bad smell to her urine.  Reports symptoms feel similar to when she had a kidney infection about 1 year ago where she was hospitalized.  Concerned of the same.  Ibuprofen not helping.    About a week and a half ago, patient was hiking when she noticed right knee pain afterwards.  No fall or specific injury.  Pain worsens with weightbearing.  Has tried ibuprofen with minimal relief in symptoms.    Review of Systems   Constitutional: Negative.  Negative for fever.   Gastrointestinal: Positive for nausea.   Genitourinary: Positive for frequency.   Musculoskeletal: Positive for back pain.        R knee pain   All other systems reviewed and are negative.    Refer to HPI for additional details.    During this visit, appropriate PPE was worn, hand hygiene was performed, and the patient and any visitors were masked.    PMH:  has a past medical history of Anemia, Anesthesia, Arthritis, ASTHMA, Breath shortness, Cancer ([facility]), Chronic low back pain (4/12/2014), Chronic neck pain (4/12/2014), Fibromyalgia, Flu-like symptoms (06/26/2021), GERD (gastroesophageal reflux disease), Infectious disease (11/19/2021), Psychiatric problem, Thyroid disease, and Unspecified vitamin D deficiency (4/12/2014).    She has no past  medical history of Malignant hyperthermia.    MEDS:   Current Outpatient Medications:   •  cefdinir (OMNICEF) 300 MG Cap, Take 1 Capsule by mouth 2 times a day for 7 days., Disp: 14 Capsule, Rfl: 0  •  promethazine (PHENERGAN) 25 MG Tab, Take 1 Tablet by mouth every 6 hours as needed for Nausea/Vomiting., Disp: 30 Tablet, Rfl: 0  •  predniSONE (DELTASONE) 20 MG Tab, Take 3 tabs daily x 2 days, then 2 tabs daily x 2 days, then 1 tab on final day., Disp: 11 Tablet, Rfl: 0  •  levothyroxine (SYNTHROID) 137 MCG Tab, TAKE 1 TABLET BY MOUTH TWICE DAILY, Disp: 96 Tablet, Rfl: 1  •  oxyCODONE-acetaminophen (PERCOCET) 7.5-325 MG per tablet, , Disp: , Rfl:   •  tizanidine (ZANAFLEX) 6 MG capsule, TAKE 1 CAPSULE BY MOUTH EVERY 12 HOURS AS NEEDED FOR MUSCLE SPASM, Disp: , Rfl:   •  pantoprazole (PROTONIX) 40 MG Tablet Delayed Response, Take 1 Tablet by mouth every day., Disp: 30 Tablet, Rfl: 0  •  fluticasone (FLONASE) 50 MCG/ACT nasal spray, Administer 1 Spray into affected nostril(S) every day., Disp: 9.9 mL, Rfl: 4  •  FLOVENT  MCG/ACT Aerosol, SWALLOW 4 PUFFS TWICE DAILY FOR 8 WEEKS, Disp: , Rfl:   •  albuterol (PROVENTIL) 2.5mg/3ml Nebu Soln solution for nebulization, Take 3 mL by nebulization every four hours as needed for Shortness of Breath., Disp: 3 mL, Rfl: 0  •  omeprazole (PRILOSEC) 40 MG delayed-release capsule, Take 1 Capsule by mouth 2 times a day., Disp: 180 Capsule, Rfl: 3  •  ACETAMINOPHEN EXTRA STRENGTH 500 MG Tab, , Disp: , Rfl:   •  Cholecalciferol (VITAMIN D3) 1.25 MG (11958 UT) Cap, Take 1 Capsule by mouth every 7 days., Disp: , Rfl:   •  vitamin D2, Ergocalciferol, (DRISDOL) 1.25 MG (62327 UT) Cap capsule, Take 1 Capsule by mouth every 7 days., Disp: 12 Capsule, Rfl: 3  •  lidocaine (XYLOCAINE) 5 % Ointment, , Disp: , Rfl:   •  sucralfate (CARAFATE) 1 GM Tab, , Disp: , Rfl:   •  nortriptyline (PAMELOR) 10 MG Cap, Take 10 mg by mouth every bedtime., Disp: , Rfl: 0  •  pantoprazole (PROTONIX) 20 MG  tablet, Take 1 Tablet by mouth every day. (Patient not taking: Reported on 7/17/2022), Disp: 90 Tablet, Rfl: 3  •  sucralfate (CARAFATE) 1 GM Tab, Take 1 Tablet by mouth 4 Times a Day,Before Meals and at Bedtime., Disp: 120 Tablet, Rfl: 3  •  nitrofurantoin (MACROBID) 100 MG Cap, Take 1 Capsule by mouth 2 times a day. (Patient not taking: Reported on 7/17/2022), Disp: 10 Capsule, Rfl: 0  •  ondansetron (ZOFRAN ODT) 4 MG TABLET DISPERSIBLE, Take 1 Tablet by mouth every 6 hours as needed for Nausea. (Patient not taking: Reported on 7/17/2022), Disp: 10 Tablet, Rfl: 0  •  albuterol 108 (90 Base) MCG/ACT Aero Soln inhalation aerosol, Inhale 2 Puffs every four hours as needed for Shortness of Breath., Disp: 8.5 g, Rfl: 4  •  albuterol 108 (90 Base) MCG/ACT Aero Soln inhalation aerosol, Inhale 2 Puffs every 6 hours as needed for Shortness of Breath., Disp: 8.5 g, Rfl: 0  •  promethazine-dextromethorphan (PROMETHAZINE-DM) 6.25-15 MG/5ML syrup, Take 5 mL by mouth every four hours as needed. (Patient not taking: Reported on 7/17/2022), Disp: 120 mL, Rfl: 0  •  sucralfate (CARAFATE) 1 GM Tab, Take 1 Tablet by mouth 4 Times a Day,Before Meals and at Bedtime., Disp: 120 Tablet, Rfl: 3  •  metroNIDAZOLE (METROGEL-VAGINAL) 0.75 % Gel, INSERT 1 APPLICATORFUL VAGINALLY AT BEDTIME FOR 5 DAYS. (Patient not taking: No sig reported), Disp: , Rfl:   •  albuterol 108 (90 Base) MCG/ACT Aero Soln inhalation aerosol, Inhale 2 Puffs every 6 hours as needed for Shortness of Breath., Disp: 8.5 g, Rfl: 0  •  NON SPECIFIED, amberen - menapause supplement (Patient not taking: No sig reported), Disp: , Rfl:     ALLERGIES:   Allergies   Allergen Reactions   • Propofol      Hypotension, and severe asthma attack   • Ciprofloxacin Rash     Rxn - about 2012 Hives, nausea    • Sulfa Drugs Rash     Rxn - about 2012 Hives, nausea    • Seasonal      SURGHX:   Past Surgical History:   Procedure Laterality Date   • [facility] UPPER GI ENDOSCOPY,DIAGNOSIS  1/28/2022  "   Procedure: GASTROSCOPY ;  Surgeon: Maira Nolasco D.O.;  Location: SURGERY SAME DAY [facility];  Service: Gastroenterology   • [facility] UPPER GI ENDOSCOPY,BIOPSY  1/28/2022    Procedure: GASTROSCOPY, WITH BIOPSY;  Surgeon: Maria Nolasco D.O.;  Location: SURGERY SAME DAY [facility];  Service: Gastroenterology   • [facility] UPPER GI ENDOSCOPY,W/DILAT,GASTRIC OUT  1/28/2022    Procedure: GASTROSCOPY, WITH BALLOON DILATION;  Surgeon: Maira Nolasco D.O.;  Location: SURGERY SAME DAY [facility];  Service: Gastroenterology   • CO UPPER GI ENDOSCOPY,DIAGNOSIS N/A 6/4/2021    Procedure: GASTROSCOPY;  Surgeon: Maira Nolasco D.O.;  Location: SURGERY SAME DAY [facility];  Service: Gastroenterology   • [facility] UPPER GI ENDOSCOPY,SCLER INJECT N/A 6/4/2021    Procedure: GASTROSCOPY, WITH SCLEROTHERAPY;  Surgeon: Maira Nolasco D.O.;  Location: SURGERY SAME DAY [facility];  Service: Gastroenterology   • [facility] UPPER GI ENDOSCOPY,BIOPSY N/A 6/4/2021    Procedure: GASTROSCOPY, WITH BIOPSY;  Surgeon: Maira Nolasco D.O.;  Location: SURGERY SAME DAY [facility];  Service: Gastroenterology   • [facility] UPPER GI ENDOSCOPY,W/DILAT,GASTRIC OUT N/A 6/4/2021    Procedure: GASTROSCOPY, WITH BALLOON DILATION;  Surgeon: Maira Nolasco D.O.;  Location: SURGERY SAME DAY [facility];  Service: Gastroenterology   • CHOLECYSTECTOMY  2003   • THYROIDECTOMY  1999   • ABDOMINAL HYSTERECTOMY TOTAL       SOCHX:  reports that she quit smoking about 5 years ago. Her smoking use included cigarettes. She quit after 20.00 years of use. She has never used smokeless tobacco. She reports previous alcohol use. She reports that she does not use drugs.    FH: Per HPI as applicable/pertinent.      Objective:     /76   Pulse 98   Temp 36.6 °C (97.9 °F) (Temporal)   Resp 14   Ht 1.6 m (5' 3\")   Wt 103 kg (228 lb)   LMP 11/21/2015   SpO2 98%   BMI 40.39 kg/m²     Physical Exam  Nursing note reviewed.   Constitutional:       General: She is not in acute distress.     Appearance: She is " well-developed. She is not ill-appearing, toxic-appearing or diaphoretic.   Eyes:      General: Vision grossly intact.      Extraocular Movements: Extraocular movements intact.   Cardiovascular:      Rate and Rhythm: Normal rate.   Pulmonary:      Effort: Pulmonary effort is normal. No respiratory distress.   Abdominal:      Tenderness: There is no right CVA tenderness or left CVA tenderness.   Musculoskeletal:         General: No deformity. Normal range of motion.      Cervical back: Normal range of motion.      Lumbar back: Tenderness (Right) present. No swelling or deformity. Normal range of motion.      Right knee: Swelling (Minimal) present. No deformity, erythema, ecchymosis or lacerations. Normal range of motion. No tenderness. No LCL laxity, MCL laxity, ACL laxity or PCL laxity. Normal alignment and normal patellar mobility.   Skin:     General: Skin is warm and dry.      Coloration: Skin is not pale.      Findings: No bruising, erythema or rash.   Neurological:      Mental Status: She is alert and oriented to person, place, and time.      Motor: No weakness.   Psychiatric:         Behavior: Behavior normal. Behavior is cooperative.     UA: dark yellow, turbid, otherwise unremarkable      Assessment/Plan:     1. Right flank pain  - POCT Urinalysis    2. Abnormal urine odor  - POCT Urinalysis  - URINE CULTURE(NEW); Future    3. Nausea  - promethazine (PHENERGAN) 25 MG Tab; Take 1 Tablet by mouth every 6 hours as needed for Nausea/Vomiting.  Dispense: 30 Tablet; Refill: 0    4. History of kidney infection  - cefdinir (OMNICEF) 300 MG Cap; Take 1 Capsule by mouth 2 times a day for 7 days.  Dispense: 14 Capsule; Refill: 0    5. Strain of right knee, initial encounter  - predniSONE (DELTASONE) 20 MG Tab; Take 3 tabs daily x 2 days, then 2 tabs daily x 2 days, then 1 tab on final day.  Dispense: 11 Tablet; Refill: 0    Rx as above sent electronically. Stop ibuprofen. Start Tylenol. Heat. Patient requesting nausea  medication; Zofran ineffective, but has responded well to Phenergan. Patient amenable to empiric antibiotic pending culture given symptoms and history. Increase fluids.    Differential diagnosis, natural history, supportive care, over-the-counter symptom management per 's instructions, close monitoring, and indications for immediate follow-up discussed.     All questions answered. Patient agrees with the plan of care.    Discharge summary provided via [employer].    Billing note: 30 minutes was allotted and spent for patient care and coordination of care (not reported separately) including preparing for the visit, obtaining/reviewing history, performing an exam/evaluation, ordering Rx/testing, developing a plan of care, counseling/educating the patient, developing the discharge summary for release to [facility], and documentation. Care specific to this encounter was summarized here. Please refer to the chart for additional details on the care provided.

## 2022-07-18 DIAGNOSIS — R82.90 ABNORMAL URINE ODOR: ICD-10-CM

## 2022-07-20 LAB
BACTERIA UR CULT: NORMAL
SIGNIFICANT IND 70042: NORMAL
SITE SITE: NORMAL
SOURCE SOURCE: NORMAL

## 2023-02-10 ENCOUNTER — OFFICE VISIT (OUTPATIENT)
Dept: URGENT CARE | Facility: CLINIC | Age: 53
End: 2023-02-10
Payer: MEDICAID

## 2023-02-10 VITALS
BODY MASS INDEX: 39.44 KG/M2 | SYSTOLIC BLOOD PRESSURE: 128 MMHG | WEIGHT: 231 LBS | RESPIRATION RATE: 16 BRPM | DIASTOLIC BLOOD PRESSURE: 78 MMHG | OXYGEN SATURATION: 95 % | HEIGHT: 64 IN | TEMPERATURE: 97.8 F | HEART RATE: 74 BPM

## 2023-02-10 DIAGNOSIS — T30.0 BURN: ICD-10-CM

## 2023-02-10 DIAGNOSIS — J02.9 SORE THROAT: ICD-10-CM

## 2023-02-10 DIAGNOSIS — L03.116 CELLULITIS OF LEFT LEG: ICD-10-CM

## 2023-02-10 LAB
INT CON NEG: NEGATIVE
INT CON POS: POSITIVE
S PYO AG THROAT QL: NEGATIVE

## 2023-02-10 PROCEDURE — 87880 STREP A ASSAY W/OPTIC: CPT | Performed by: PHYSICIAN ASSISTANT

## 2023-02-10 PROCEDURE — 99213 OFFICE O/P EST LOW 20 MIN: CPT | Performed by: PHYSICIAN ASSISTANT

## 2023-02-10 RX ORDER — DOXYCYCLINE HYCLATE 100 MG
100 TABLET ORAL 2 TIMES DAILY
Qty: 14 TABLET | Refills: 0 | Status: SHIPPED | OUTPATIENT
Start: 2023-02-10 | End: 2023-02-17

## 2023-02-10 RX ORDER — LIDOCAINE HYDROCHLORIDE 20 MG/ML
5-15 SOLUTION OROPHARYNGEAL EVERY 4 HOURS PRN
Qty: 100 ML | Refills: 1 | Status: SHIPPED | OUTPATIENT
Start: 2023-02-10

## 2023-02-10 ASSESSMENT — ENCOUNTER SYMPTOMS
VOMITING: 1
MYALGIAS: 1
SHORTNESS OF BREATH: 0
BLURRED VISION: 0
CHILLS: 1
DIZZINESS: 0
DIARRHEA: 1
FEVER: 1
ABDOMINAL PAIN: 0
EYE PAIN: 0
SORE THROAT: 1
PALPITATIONS: 0
NAUSEA: 1
SINUS PAIN: 0
HEADACHES: 1
COUGH: 1

## 2023-02-10 ASSESSMENT — FIBROSIS 4 INDEX: FIB4 SCORE: 0.56
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
## 2023-12-08 ENCOUNTER — OFFICE VISIT (OUTPATIENT)
Dept: URGENT CARE | Facility: CLINIC | Age: 53
End: 2023-12-08
Payer: MEDICAID

## 2023-12-08 VITALS
HEART RATE: 83 BPM | RESPIRATION RATE: 16 BRPM | HEIGHT: 64 IN | BODY MASS INDEX: 43.02 KG/M2 | DIASTOLIC BLOOD PRESSURE: 82 MMHG | SYSTOLIC BLOOD PRESSURE: 120 MMHG | WEIGHT: 252 LBS | TEMPERATURE: 96.8 F | OXYGEN SATURATION: 97 %

## 2023-12-08 DIAGNOSIS — J01.90 ACUTE BACTERIAL SINUSITIS: ICD-10-CM

## 2023-12-08 DIAGNOSIS — R30.0 DYSURIA: ICD-10-CM

## 2023-12-08 DIAGNOSIS — B96.89 ACUTE BACTERIAL SINUSITIS: ICD-10-CM

## 2023-12-08 LAB
APPEARANCE UR: NORMAL
BILIRUB UR STRIP-MCNC: NORMAL MG/DL
COLOR UR AUTO: YELLOW
GLUCOSE UR STRIP.AUTO-MCNC: NORMAL MG/DL
KETONES UR STRIP.AUTO-MCNC: NORMAL MG/DL
LEUKOCYTE ESTERASE UR QL STRIP.AUTO: NORMAL
NITRITE UR QL STRIP.AUTO: NORMAL
PH UR STRIP.AUTO: 6.5 [PH] (ref 5–8)
PROT UR QL STRIP: 6.5 MG/DL
RBC UR QL AUTO: NORMAL
SP GR UR STRIP.AUTO: 1.02
UROBILINOGEN UR STRIP-MCNC: 0.2 MG/DL

## 2023-12-08 PROCEDURE — 3074F SYST BP LT 130 MM HG: CPT | Performed by: PHYSICIAN ASSISTANT

## 2023-12-08 PROCEDURE — 3079F DIAST BP 80-89 MM HG: CPT | Performed by: PHYSICIAN ASSISTANT

## 2023-12-08 PROCEDURE — 99214 OFFICE O/P EST MOD 30 MIN: CPT | Mod: 25 | Performed by: PHYSICIAN ASSISTANT

## 2023-12-08 PROCEDURE — 81002 URINALYSIS NONAUTO W/O SCOPE: CPT | Performed by: PHYSICIAN ASSISTANT

## 2023-12-08 RX ORDER — AMOXICILLIN AND CLAVULANATE POTASSIUM 875; 125 MG/1; MG/1
1 TABLET, FILM COATED ORAL 2 TIMES DAILY
Qty: 14 TABLET | Refills: 0 | Status: SHIPPED | OUTPATIENT
Start: 2023-12-08 | End: 2023-12-15

## 2023-12-08 RX ORDER — AMOXICILLIN AND CLAVULANATE POTASSIUM 875; 125 MG/1; MG/1
1 TABLET, FILM COATED ORAL 2 TIMES DAILY
Qty: 14 TABLET | Refills: 0 | Status: SHIPPED | OUTPATIENT
Start: 2023-12-08 | End: 2023-12-08

## 2023-12-08 ASSESSMENT — ENCOUNTER SYMPTOMS
HEADACHES: 0
SPUTUM PRODUCTION: 0
MYALGIAS: 0
SORE THROAT: 1
CHILLS: 0
DIARRHEA: 0
DIZZINESS: 0
COUGH: 0
DIAPHORESIS: 0
ABDOMINAL PAIN: 0
NAUSEA: 0
SHORTNESS OF BREATH: 0
PALPITATIONS: 0
FLANK PAIN: 0
FEVER: 0
WHEEZING: 0
VOMITING: 0
BACK PAIN: 1
SINUS PAIN: 1

## 2023-12-08 ASSESSMENT — FIBROSIS 4 INDEX: FIB4 SCORE: 0.57

## 2023-12-08 NOTE — PROGRESS NOTES
Subjective:     CHIEF COMPLAINT  Chief Complaint   Patient presents with    Sinusitis     X 7 days, sore throat, ear pain& possible UTI-frequent urination, low back hurts       HPI  Jayashree Carrasco is a very pleasant 53 y.o. female who presents to the clinic with 2 separate complaints today.    Sinusitis:  Patient has been experiencing sinus pain and pressure x 1 week.  Symptoms are progressively worsening.  Mucus is thick and green.  Experiencing pressure predominantly over the left maxillary sinus.  Also has a sore throat seems to be worse in the morning.  Bilateral ears feel full and muffled.  No recent fevers.  Experiencing mild bodyaches and chills.  No cough.  Has been taking [place] cold and flu.    Dysuria:  Patient has been experiencing intermittent dysuria and urinary frequency over the last 2-3 days.  Denies any hematuria.  No associated abdominal pain, flank pain, nausea or vomiting.    REVIEW OF SYSTEMS  Review of Systems   Constitutional:  Positive for malaise/fatigue. Negative for chills, diaphoresis and fever.   HENT:  Positive for congestion, ear pain, sinus pain and sore throat.    Respiratory:  Negative for cough, sputum production, shortness of breath and wheezing.    Cardiovascular:  Negative for chest pain and palpitations.   Gastrointestinal:  Negative for abdominal pain, diarrhea, nausea and vomiting.   Genitourinary:  Positive for dysuria and frequency. Negative for flank pain and hematuria.   Musculoskeletal:  Positive for back pain. Negative for myalgias.   Neurological:  Negative for dizziness and headaches.   Endo/Heme/Allergies:  Negative for environmental allergies.       PAST MEDICAL HISTORY  Patient Active Problem List    Diagnosis Date Noted    Leukocytosis 05/23/2022    Urinary incontinence 05/23/2022    BMI 38.0-38.9,adult 04/11/2022    Screening for diabetes mellitus 04/11/2022    Dysuria 04/11/2022    Acute recurrent frontal sinusitis 04/11/2022    Seasonal allergic  rhinitis due to pollen 04/11/2022    Eosinophilic esophagitis 01/28/2022    Hx of thyroid cancer 04/08/2021    Other chest pain 09/23/2020    Viral syndrome 09/23/2020    Lipoma 06/24/2020    Axillary mass 06/02/2020    Seborrheic keratosis 06/02/2020    Stricture of esophagus 04/13/2020    Hypothyroidism, postsurgical 04/15/2019    Infected tooth 03/29/2019    Acute otitis media 01/24/2019    Urinary frequency 01/24/2019    Vaginal candidiasis 12/18/2018    Nausea vomiting and diarrhea 10/18/2018    Schatzki's ring 10/18/2018    Abnormal brain MRI 09/27/2018    Plantar fasciitis, right 04/12/2018    Seasonal allergies 04/12/2018    Chronic UTI 12/19/2017    Asthma 06/13/2017    Degeneration of intervertebral disc 06/13/2017    Morbid obesity (HCC) 06/13/2017    Mild intermittent asthma without complication 05/30/2017    History of papillary adenocarcinoma of thyroid 05/30/2017    GERD (gastroesophageal reflux disease) 05/30/2017    Obesity (BMI 35.0-39.9 without comorbidity) 05/30/2017    Abnormal drug screen 05/02/2017    Chronic low back pain 04/12/2014    Chronic neck pain 04/12/2014    Vitamin D deficiency 04/12/2014    Hypothyroidism 06/07/2012    Fibromyalgia 06/07/2012       SURGICAL HISTORY   has a past surgical history that includes thyroidectomy (1999); abdominal hysterectomy total; cholecystectomy (2003); upper gi endoscopy,diagnosis (N/A, 6/4/2021); upper gi endoscopy,scler inject (N/A, 6/4/2021); upper gi endoscopy,biopsy (N/A, 6/4/2021); upper gi endoscopy,w/dilat,gastric out (N/A, 6/4/2021); upper gi endoscopy,diagnosis (1/28/2022); upper gi endoscopy,biopsy (1/28/2022); and upper gi endoscopy,w/dilat,gastric out (1/28/2022).    ALLERGIES  Allergies   Allergen Reactions    Propofol      Hypotension, and severe asthma attack    Ciprofloxacin Rash     Rxn - about 2012 Hives, nausea     Sulfa Drugs Rash     Rxn - about 2012 Hives, nausea     Seasonal        CURRENT MEDICATIONS  Home Medications        Reviewed by Frank Reinoso P.A.-C. (Physician Assistant) on 23 at 1533  Med List Status: <None>     Medication Last Dose Status   ACETAMINOPHEN EXTRA STRENGTH 500 MG Tab  Active   albuterol (PROVENTIL) 2.5mg/3ml Nebu Soln solution for nebulization  Active   albuterol 108 (90 Base) MCG/ACT Aero Soln inhalation aerosol  Active   albuterol 108 (90 Base) MCG/ACT Aero Soln inhalation aerosol  Active   albuterol 108 (90 Base) MCG/ACT Aero Soln inhalation aerosol  Active   Cholecalciferol (VITAMIN D3) 1.25 MG (30188 UT) Cap  Active   FLOVENT  MCG/ACT Aerosol  Active   fluticasone (FLONASE) 50 MCG/ACT nasal spray  Active   levothyroxine (SYNTHROID) 137 MCG Tab  Active   lidocaine (XYLOCAINE) 2 % Solution  Active   lidocaine (XYLOCAINE) 5 % Ointment  Active   metroNIDAZOLE (METROGEL-VAGINAL) 0.75 % Gel  Active   nitrofurantoin (MACROBID) 100 MG Cap  Active   NON SPECIFIED  Active   nortriptyline (PAMELOR) 10 MG Cap  Active   omeprazole (PRILOSEC) 40 MG delayed-release capsule  Active   ondansetron (ZOFRAN ODT) 4 MG TABLET DISPERSIBLE  Active   oxyCODONE-acetaminophen (PERCOCET) 7.5-325 MG per tablet  Active   pantoprazole (PROTONIX) 20 MG tablet  Active   pantoprazole (PROTONIX) 40 MG Tablet Delayed Response  Active   predniSONE (DELTASONE) 20 MG Tab  Active   promethazine (PHENERGAN) 25 MG Tab  Active   promethazine-dextromethorphan (PROMETHAZINE-DM) 6.25-15 MG/5ML syrup  Active   sucralfate (CARAFATE) 1 GM Tab  Active   sucralfate (CARAFATE) 1 GM Tab  Active   sucralfate (CARAFATE) 1 GM Tab  Active   tizanidine (ZANAFLEX) 6 MG capsule  Active   vitamin D2, Ergocalciferol, (DRISDOL) 1.25 MG (88506 UT) Cap capsule  Active                    SOCIAL HISTORY  Social History     Tobacco Use    Smoking status: Former     Current packs/day: 0.00     Types: Cigarettes     Start date: 1997     Quit date: 2017     Years since quittin.9    Smokeless tobacco: Never    Tobacco comments:     1 cigarette week  "  Vaping Use    Vaping Use: Never used   Substance and Sexual Activity    Alcohol use: Not Currently     Alcohol/week: 0.0 oz    Drug use: No    Sexual activity: Yes     Partners: Male     Birth control/protection: Surgical       FAMILY HISTORY  Family History   Problem Relation Age of Onset    Stroke Mother 40    Cancer Mother         leukemia    Cancer Maternal Aunt 72        breast    Diabetes Maternal Grandfather     Cancer Cousin 51        bone          Objective:     VITAL SIGNS: /82 (BP Location: Right arm, Patient Position: Sitting, BP Cuff Size: Large adult)   Pulse 83   Temp 36 °C (96.8 °F)   Resp 16   Ht 1.626 m (5' 4\")   Wt 114 kg (252 lb)   LMP 11/21/2015   SpO2 97%   BMI 43.26 kg/m²     PHYSICAL EXAM  Physical Exam  Constitutional:       General: She is not in acute distress.     Appearance: Normal appearance. She is not ill-appearing, toxic-appearing or diaphoretic.   HENT:      Head: Normocephalic and atraumatic.      Right Ear: Ear canal and external ear normal.      Left Ear: Ear canal and external ear normal.      Ears:      Comments: Bilateral serous middle ear effusion     Nose: Congestion and rhinorrhea present.      Mouth/Throat:      Mouth: Mucous membranes are moist.      Pharynx: No oropharyngeal exudate or posterior oropharyngeal erythema.      Comments: PND  Eyes:      Conjunctiva/sclera: Conjunctivae normal.   Cardiovascular:      Rate and Rhythm: Normal rate and regular rhythm.      Pulses: Normal pulses.      Heart sounds: Normal heart sounds.   Pulmonary:      Effort: Pulmonary effort is normal.      Breath sounds: Normal breath sounds. No wheezing, rhonchi or rales.   Musculoskeletal:      Cervical back: Normal range of motion. No muscular tenderness.   Lymphadenopathy:      Cervical: No cervical adenopathy.   Skin:     General: Skin is warm and dry.      Capillary Refill: Capillary refill takes less than 2 seconds.   Neurological:      Mental Status: She is alert. "   Psychiatric:         Mood and Affect: Mood normal.         Thought Content: Thought content normal.       Lab Results/POC Test Results   Results for orders placed or performed in visit on 12/08/23   POCT Urinalysis   Result Value Ref Range    POC Color yellow Negative    POC Appearance slightly cloudy Negative    POC Glucose neg Negative mg/dL    POC Bilirubin neg Negative mg/dL    POC Ketones neg Negative mg/dL    POC Specific Gravity 1.025 <1.005 - >1.030    POC Blood neg Negative    POC Urine PH 6.5 5.0 - 8.0    POC Protein 6.5 Negative mg/dL    POC Urobiligen 0.2 Negative (0.2) mg/dL    POC Nitrites neg Negative    POC Leukocyte Esterase neg Negative           Assessment/Plan:     1. Acute bacterial sinusitis  - amoxicillin-clavulanate (AUGMENTIN) 875-125 MG Tab; Take 1 Tablet by mouth 2 times a day for 7 days.  Dispense: 14 Tablet; Refill: 0    2. Dysuria  - POCT Urinalysis      MDM/Comments:    -Nasal spray and allergy medications as directed (Zyrtec or Loratadine).  -You may try saline irrigation or neti pot.   -Drink plenty of fluids.  -Ibuprofen or Tylenol as directed for pain.   -Warm compress to sinuses.      Follow up with primary care provider. Urgently for worsening symptoms, persistent fevers, facial swelling, visual changes, weakness, elevated heart rate, stiff neck, symptoms last longer than 10 days, or any other concerns.    Differential diagnosis, natural history, supportive care, and indications for immediate follow-up discussed. All questions answered. Patient agrees with the plan of care.    Follow-up as needed if symptoms worsen or fail to improve to PCP, Urgent care or Emergency Room.    I have personally reviewed prior external notes and test results pertinent to today's visit.  I have independently reviewed and interpreted all diagnostics ordered during this urgent care acute visit.   Discussed management options (risks,benefits, and alternatives to treatment). Pt expresses understanding  and the treatment plan was agreed upon. Questions were encouraged and answered to pt's satisfaction.    Please note that this dictation was created using voice recognition software. I have made a reasonable attempt to correct obvious errors, but I expect that there are errors of grammar and possibly content that I did not discover before finalizing the note.

## 2024-02-08 ENCOUNTER — OFFICE VISIT (OUTPATIENT)
Dept: URGENT CARE | Facility: CLINIC | Age: 54
End: 2024-02-08
Payer: MEDICAID

## 2024-02-08 VITALS
HEART RATE: 78 BPM | RESPIRATION RATE: 16 BRPM | BODY MASS INDEX: 44.3 KG/M2 | DIASTOLIC BLOOD PRESSURE: 86 MMHG | TEMPERATURE: 96.9 F | HEIGHT: 63 IN | OXYGEN SATURATION: 97 % | SYSTOLIC BLOOD PRESSURE: 132 MMHG | WEIGHT: 250 LBS

## 2024-02-08 DIAGNOSIS — J11.1 INFLUENZA-LIKE ILLNESS: ICD-10-CM

## 2024-02-08 PROCEDURE — 3075F SYST BP GE 130 - 139MM HG: CPT | Performed by: PHYSICIAN ASSISTANT

## 2024-02-08 PROCEDURE — 99213 OFFICE O/P EST LOW 20 MIN: CPT | Performed by: PHYSICIAN ASSISTANT

## 2024-02-08 PROCEDURE — 3079F DIAST BP 80-89 MM HG: CPT | Performed by: PHYSICIAN ASSISTANT

## 2024-02-08 RX ORDER — ALBUTEROL SULFATE 90 UG/1
2 AEROSOL, METERED RESPIRATORY (INHALATION) EVERY 6 HOURS PRN
Qty: 8.5 G | Refills: 0 | Status: SHIPPED | OUTPATIENT
Start: 2024-02-08

## 2024-02-08 RX ORDER — DEXTROMETHORPHAN HYDROBROMIDE AND PROMETHAZINE HYDROCHLORIDE 15; 6.25 MG/5ML; MG/5ML
5 SYRUP ORAL EVERY 4 HOURS PRN
Qty: 120 ML | Refills: 0 | Status: SHIPPED | OUTPATIENT
Start: 2024-02-08

## 2024-02-08 RX ORDER — PROMETHAZINE HYDROCHLORIDE 25 MG/1
25 TABLET ORAL EVERY 6 HOURS PRN
Qty: 30 TABLET | Refills: 0 | Status: SHIPPED | OUTPATIENT
Start: 2024-02-08

## 2024-02-08 ASSESSMENT — FIBROSIS 4 INDEX: FIB4 SCORE: 0.57

## 2024-02-09 ASSESSMENT — ENCOUNTER SYMPTOMS
SPUTUM PRODUCTION: 0
ABDOMINAL PAIN: 0
HEARTBURN: 0
PALPITATIONS: 0
HEADACHES: 1
DIARRHEA: 1
CHILLS: 0
DIAPHORESIS: 0
FEVER: 1
MYALGIAS: 1
SINUS PAIN: 0
VOMITING: 1
DIZZINESS: 0
WHEEZING: 0
NAUSEA: 1
SHORTNESS OF BREATH: 0
SORE THROAT: 1
COUGH: 1

## 2024-02-09 NOTE — PROGRESS NOTES
Subjective:     CHIEF COMPLAINT  Chief Complaint   Patient presents with    Vomiting     X 5 DAYS, NAUSEA, VOMITING, DIARRHEA, BODY ACHES, CHEST CONGESTION,  HEADACHE, CHILLS, RUNNY NOSE, COUGHING WHITE MUCUS, SORE THROAT, FEVER       HPI  Jayashree Carrasco is a very pleasant 53 y.o. female who presents to the clinic with flulike symptoms x 5 days.  Patient describes generalized body aches, chills, fever, cough, congestion, sore throat, nausea, vomiting and diarrhea.  Multiple members of her household are experiencing similar symptoms at this time.  Currently not taking any over-the-counter medications for her symptoms.  No shortness of breath or chest pain.  Cough is slightly productive of clear/white sputum.    REVIEW OF SYSTEMS  Review of Systems   Constitutional:  Positive for fever and malaise/fatigue. Negative for chills and diaphoresis.   HENT:  Positive for congestion and sore throat. Negative for ear pain and sinus pain.    Respiratory:  Positive for cough. Negative for sputum production, shortness of breath and wheezing.    Cardiovascular:  Negative for chest pain and palpitations.   Gastrointestinal:  Positive for diarrhea, nausea and vomiting. Negative for abdominal pain and heartburn.   Musculoskeletal:  Positive for myalgias.   Neurological:  Positive for headaches. Negative for dizziness.       PAST MEDICAL HISTORY  Patient Active Problem List    Diagnosis Date Noted    Leukocytosis 05/23/2022    Urinary incontinence 05/23/2022    BMI 38.0-38.9,adult 04/11/2022    Screening for diabetes mellitus 04/11/2022    Dysuria 04/11/2022    Acute recurrent frontal sinusitis 04/11/2022    Seasonal allergic rhinitis due to pollen 04/11/2022    Eosinophilic esophagitis 01/28/2022    Hx of thyroid cancer 04/08/2021    Other chest pain 09/23/2020    Viral syndrome 09/23/2020    Lipoma 06/24/2020    Axillary mass 06/02/2020    Seborrheic keratosis 06/02/2020    Stricture of esophagus 04/13/2020    Hypothyroidism,  postsurgical 04/15/2019    Infected tooth 03/29/2019    Acute otitis media 01/24/2019    Urinary frequency 01/24/2019    Vaginal candidiasis 12/18/2018    Nausea vomiting and diarrhea 10/18/2018    Schatzki's ring 10/18/2018    Abnormal brain MRI 09/27/2018    Plantar fasciitis, right 04/12/2018    Seasonal allergies 04/12/2018    Chronic UTI 12/19/2017    Asthma 06/13/2017    Degeneration of intervertebral disc 06/13/2017    Morbid obesity (HCC) 06/13/2017    Mild intermittent asthma without complication 05/30/2017    History of papillary adenocarcinoma of thyroid 05/30/2017    GERD (gastroesophageal reflux disease) 05/30/2017    Obesity (BMI 35.0-39.9 without comorbidity) 05/30/2017    Abnormal drug screen 05/02/2017    Chronic low back pain 04/12/2014    Chronic neck pain 04/12/2014    Vitamin D deficiency 04/12/2014    Hypothyroidism 06/07/2012    Fibromyalgia 06/07/2012       SURGICAL HISTORY   has a past surgical history that includes thyroidectomy (1999); abdominal hysterectomy total; cholecystectomy (2003); upper gi endoscopy,diagnosis (N/A, 6/4/2021); upper gi endoscopy,scler inject (N/A, 6/4/2021); upper gi endoscopy,biopsy (N/A, 6/4/2021); upper gi endoscopy,w/dilat,gastric out (N/A, 6/4/2021); upper gi endoscopy,diagnosis (1/28/2022); upper gi endoscopy,biopsy (1/28/2022); and upper gi endoscopy,w/dilat,gastric out (1/28/2022).    ALLERGIES  Allergies   Allergen Reactions    Propofol      Hypotension, and severe asthma attack    Ciprofloxacin Rash     Rxn - about 2012 Hives, nausea     Sulfa Drugs Rash     Rxn - about 2012 Hives, nausea     Seasonal        CURRENT MEDICATIONS  Home Medications       Reviewed by Frank Reinoso P.A.-C. (Physician Assistant) on 02/08/24 at 1853  Med List Status: <None>     Medication Last Dose Status   ACETAMINOPHEN EXTRA STRENGTH 500 MG Tab  Active   albuterol (PROVENTIL) 2.5mg/3ml Nebu Soln solution for nebulization PRN Active   albuterol 108 (90 Base) MCG/ACT Aero Soln  inhalation aerosol PRN Active   albuterol 108 (90 Base) MCG/ACT Aero Soln inhalation aerosol PRN Active   albuterol 108 (90 Base) MCG/ACT Aero Soln inhalation aerosol  Active   Cholecalciferol (VITAMIN D3) 1.25 MG (70015 UT) Cap Taking Active   FLOVENT  MCG/ACT Aerosol Taking Active   fluticasone (FLONASE) 50 MCG/ACT nasal spray Taking Active   levothyroxine (SYNTHROID) 137 MCG Tab  Active   lidocaine (XYLOCAINE) 2 % Solution Not Taking Active   lidocaine (XYLOCAINE) 5 % Ointment Taking Active   metroNIDAZOLE (METROGEL-VAGINAL) 0.75 % Gel  Active   nitrofurantoin (MACROBID) 100 MG Cap  Active   NON SPECIFIED  Active   nortriptyline (PAMELOR) 10 MG Cap Taking Active   omeprazole (PRILOSEC) 40 MG delayed-release capsule Taking Active   ondansetron (ZOFRAN ODT) 4 MG TABLET DISPERSIBLE Not Taking Active   oxyCODONE-acetaminophen (PERCOCET) 7.5-325 MG per tablet PRN Active   pantoprazole (PROTONIX) 20 MG tablet  Active   pantoprazole (PROTONIX) 40 MG Tablet Delayed Response Taking Active   predniSONE (DELTASONE) 20 MG Tab  Active   promethazine (PHENERGAN) 25 MG Tab Taking Active   promethazine-dextromethorphan (PROMETHAZINE-DM) 6.25-15 MG/5ML syrup Not Taking Active   sucralfate (CARAFATE) 1 GM Tab Taking Active   sucralfate (CARAFATE) 1 GM Tab Taking Active   sucralfate (CARAFATE) 1 GM Tab  Active   tizanidine (ZANAFLEX) 6 MG capsule Taking Active   vitamin D2, Ergocalciferol, (DRISDOL) 1.25 MG (92584 UT) Cap capsule Taking Active                    SOCIAL HISTORY  Social History     Tobacco Use    Smoking status: Former     Current packs/day: 0.00     Types: Cigarettes     Start date: 1997     Quit date: 2017     Years since quittin.1    Smokeless tobacco: Never    Tobacco comments:     1 cigarette week   Vaping Use    Vaping Use: Never used   Substance and Sexual Activity    Alcohol use: Not Currently     Comment: OCC    Drug use: No    Sexual activity: Yes     Partners: Male     Birth  "control/protection: Surgical       FAMILY HISTORY  Family History   Problem Relation Age of Onset    Stroke Mother 40    Cancer Mother         leukemia    Cancer Maternal Aunt 72        breast    Diabetes Maternal Grandfather     Cancer Cousin 51        bone          Objective:     VITAL SIGNS: /86 (BP Location: Right arm, Patient Position: Sitting, BP Cuff Size: Large adult)   Pulse 78   Temp 36.1 °C (96.9 °F) (Temporal)   Resp 16   Ht 1.6 m (5' 3\")   Wt 113 kg (250 lb) Comment: WITH SHOES AND LAYERS OF CLOTHING ON  LMP 11/21/2015   SpO2 97%   BMI 44.29 kg/m²     PHYSICAL EXAM  Physical Exam  Constitutional:       General: She is not in acute distress.     Appearance: Normal appearance. She is not ill-appearing, toxic-appearing or diaphoretic.   HENT:      Head: Normocephalic and atraumatic.      Right Ear: Tympanic membrane, ear canal and external ear normal.      Left Ear: Tympanic membrane, ear canal and external ear normal.      Nose: Congestion and rhinorrhea present.      Mouth/Throat:      Mouth: Mucous membranes are moist.      Pharynx: No oropharyngeal exudate or posterior oropharyngeal erythema.   Eyes:      Conjunctiva/sclera: Conjunctivae normal.   Cardiovascular:      Rate and Rhythm: Normal rate and regular rhythm.      Pulses: Normal pulses.      Heart sounds: Normal heart sounds.   Pulmonary:      Effort: Pulmonary effort is normal.      Breath sounds: Normal breath sounds. No wheezing, rhonchi or rales.   Abdominal:      Tenderness: There is no abdominal tenderness.   Musculoskeletal:      Cervical back: Normal range of motion. No muscular tenderness.   Lymphadenopathy:      Cervical: No cervical adenopathy.   Skin:     General: Skin is warm and dry.   Neurological:      Mental Status: She is alert.   Psychiatric:         Mood and Affect: Mood normal.         Thought Content: Thought content normal.         Assessment/Plan:     1. Influenza-like illness  - " promethazine-dextromethorphan (PROMETHAZINE-DM) 6.25-15 MG/5ML syrup; Take 5 mL by mouth every four hours as needed for Cough.  Dispense: 120 mL; Refill: 0  - promethazine (PHENERGAN) 25 MG Tab; Take 1 Tablet by mouth every 6 hours as needed for Nausea/Vomiting.  Dispense: 30 Tablet; Refill: 0  - albuterol 108 (90 Base) MCG/ACT Aero Soln inhalation aerosol; Inhale 2 Puffs every 6 hours as needed for Shortness of Breath.  Dispense: 8.5 g; Refill: 0      MDM/Comments:    -Discussed viral etiology of URI.     Symptomatic care.  -Oral hydration and rest.   -Over the counter expectorant as directed; Guaifenesin (Mucinex).  -Diphenhydramine as directed for rhinorrhea (runny nose) and sneezing.  --May take over the counter pseudoephedrine for nasal congestion. Can increase your blood pressure.  -Trial of Imodium for diarrhea if needed.  Promethazine invited for nausea.  -Tylenol or ibuprofen for pain and fever as directed.   -Saline nasal spray as a decongestant.    Follow up for shortness of breath, fevers, elevated heart rate, weakness, prolonged cough, chest pain, or any other concerns.      Differential diagnosis, natural history, supportive care, and indications for immediate follow-up discussed. All questions answered. Patient agrees with the plan of care.    Follow-up as needed if symptoms worsen or fail to improve to PCP, Urgent care or Emergency Room.    I have personally reviewed prior external notes and test results pertinent to today's visit.  I have independently reviewed and interpreted all diagnostics ordered during this urgent care acute visit.   Discussed management options (risks,benefits, and alternatives to treatment). Pt expresses understanding and the treatment plan was agreed upon. Questions were encouraged and answered to pt's satisfaction.    Please note that this dictation was created using voice recognition software. I have made a reasonable attempt to correct obvious errors, but I expect that  there are errors of grammar and possibly content that I did not discover before finalizing the note.

## 2024-02-23 DIAGNOSIS — J11.1 INFLUENZA-LIKE ILLNESS: ICD-10-CM

## 2024-03-14 DIAGNOSIS — J11.1 INFLUENZA-LIKE ILLNESS: ICD-10-CM

## 2024-05-23 RX ORDER — PROMETHAZINE HYDROCHLORIDE 25 MG/1
25 TABLET ORAL EVERY 6 HOURS PRN
Qty: 30 TABLET | Refills: 0 | OUTPATIENT
Start: 2024-05-23

## 2024-06-12 RX ORDER — ALBUTEROL SULFATE 90 UG/1
2 AEROSOL, METERED RESPIRATORY (INHALATION) EVERY 6 HOURS PRN
Qty: 18 G | OUTPATIENT
Start: 2024-06-12

## 2024-06-14 ENCOUNTER — OFFICE VISIT (OUTPATIENT)
Dept: URGENT CARE | Facility: CLINIC | Age: 54
End: 2024-06-14
Payer: MEDICAID

## 2024-06-14 VITALS
RESPIRATION RATE: 15 BRPM | HEIGHT: 63 IN | BODY MASS INDEX: 43.96 KG/M2 | HEART RATE: 91 BPM | SYSTOLIC BLOOD PRESSURE: 106 MMHG | WEIGHT: 248.1 LBS | DIASTOLIC BLOOD PRESSURE: 66 MMHG | TEMPERATURE: 98.3 F | OXYGEN SATURATION: 95 %

## 2024-06-14 DIAGNOSIS — J30.2 SEASONAL ALLERGIES: ICD-10-CM

## 2024-06-14 DIAGNOSIS — B96.89 ACUTE BACTERIAL SINUSITIS: Primary | ICD-10-CM

## 2024-06-14 DIAGNOSIS — E55.9 VITAMIN D DEFICIENCY: ICD-10-CM

## 2024-06-14 DIAGNOSIS — L08.9 SKIN INFECTION: ICD-10-CM

## 2024-06-14 DIAGNOSIS — J01.90 ACUTE BACTERIAL SINUSITIS: Primary | ICD-10-CM

## 2024-06-14 PROCEDURE — 99214 OFFICE O/P EST MOD 30 MIN: CPT | Performed by: NURSE PRACTITIONER

## 2024-06-14 PROCEDURE — 3074F SYST BP LT 130 MM HG: CPT | Performed by: NURSE PRACTITIONER

## 2024-06-14 PROCEDURE — 3078F DIAST BP <80 MM HG: CPT | Performed by: NURSE PRACTITIONER

## 2024-06-14 RX ORDER — CHOLECALCIFEROL (VITAMIN D3) 1250 MCG
1 CAPSULE ORAL
Qty: 12 CAPSULE | Refills: 0 | Status: SHIPPED | OUTPATIENT
Start: 2024-06-14

## 2024-06-14 RX ORDER — FLUTICASONE PROPIONATE 50 MCG
1 SPRAY, SUSPENSION (ML) NASAL DAILY
Qty: 16 G | Refills: 11 | Status: SHIPPED | OUTPATIENT
Start: 2024-06-14

## 2024-06-14 RX ORDER — AMOXICILLIN AND CLAVULANATE POTASSIUM 875; 125 MG/1; MG/1
1 TABLET, FILM COATED ORAL 2 TIMES DAILY
Qty: 14 TABLET | Refills: 0 | Status: SHIPPED | OUTPATIENT
Start: 2024-06-14 | End: 2024-06-21

## 2024-06-14 RX ORDER — CETIRIZINE HYDROCHLORIDE 10 MG/1
10 TABLET ORAL DAILY
Qty: 90 TABLET | Refills: 0 | Status: SHIPPED | OUTPATIENT
Start: 2024-06-14

## 2024-06-14 ASSESSMENT — ENCOUNTER SYMPTOMS
SORE THROAT: 0
DIARRHEA: 0
NAUSEA: 0
MYALGIAS: 0
SINUS PAIN: 1
EYE DISCHARGE: 0
COUGH: 0
CHILLS: 0
ORTHOPNEA: 0
HEADACHES: 1
FEVER: 0

## 2024-06-14 ASSESSMENT — FIBROSIS 4 INDEX: FIB4 SCORE: 0.57

## 2024-06-14 NOTE — PROGRESS NOTES
Subjective     Jayashree Carrasco is a 53 y.o. female who presents with Sinus Problem (Pt has a headache, ear pain, congestion, sinus pressure x 2 weeks )            HPI  New problem she is been taking over-the-counter combination cough and cold for her symptoms..  Patient is a 53-year-old female who presents with a 2-week history of headache, nasal congestion, sinus pressure, and ear pain.  She denies fever, chills, cough, sore throat, nausea, or diarrhea.  Additionally she is requesting refills on her vitamin D and her allergy medications.  Propofol, Ciprofloxacin, Sulfa drugs, and Seasonal  Current Outpatient Medications on File Prior to Visit   Medication Sig Dispense Refill    Pseudoeph-Doxylamine-DM-APAP (DAYQUIL/NYQUIL COLD/FLU RELIEF PO) Take  by mouth.      promethazine (PHENERGAN) 25 MG Tab Take 1 Tablet by mouth every 6 hours as needed for Nausea/Vomiting. 30 Tablet 0    albuterol 108 (90 Base) MCG/ACT Aero Soln inhalation aerosol Inhale 2 Puffs every 6 hours as needed for Shortness of Breath. 8.5 g 0    promethazine (PHENERGAN) 25 MG Tab Take 1 Tablet by mouth every 6 hours as needed for Nausea/Vomiting. 30 Tablet 0    oxyCODONE-acetaminophen (PERCOCET) 7.5-325 MG per tablet       pantoprazole (PROTONIX) 40 MG Tablet Delayed Response Take 1 Tablet by mouth every day. 30 Tablet 0    fluticasone (FLONASE) 50 MCG/ACT nasal spray Administer 1 Spray into affected nostril(S) every day. 9.9 mL 4    FLOVENT  MCG/ACT Aerosol SWALLOW 4 PUFFS TWICE DAILY FOR 8 WEEKS      albuterol 108 (90 Base) MCG/ACT Aero Soln inhalation aerosol Inhale 2 Puffs every 6 hours as needed for Shortness of Breath. 8.5 g 0    albuterol (PROVENTIL) 2.5mg/3ml Nebu Soln solution for nebulization Take 3 mL by nebulization every four hours as needed for Shortness of Breath. 3 mL 0    sucralfate (CARAFATE) 1 GM Tab Take 1 Tablet by mouth 4 Times a Day,Before Meals and at Bedtime. 120 Tablet 3    vitamin D2, Ergocalciferol, (DRISDOL)  1.25 MG (59651 UT) Cap capsule Take 1 Capsule by mouth every 7 days. 12 Capsule 3    lidocaine (XYLOCAINE) 5 % Ointment       sucralfate (CARAFATE) 1 GM Tab       albuterol 108 (90 Base) MCG/ACT Aero Soln inhalation aerosol Inhale 2 Puffs every 6 hours as needed for Shortness of Breath. 8.5 g 0    nortriptyline (PAMELOR) 10 MG Cap Take 10 mg by mouth every bedtime.  0    promethazine-dextromethorphan (PROMETHAZINE-DM) 6.25-15 MG/5ML syrup Take 5 mL by mouth every four hours as needed for Cough. (Patient not taking: Reported on 6/14/2024) 120 mL 0    lidocaine (XYLOCAINE) 2 % Solution Take 5-15 mL by mouth every four hours as needed for Throat/Mouth Pain. (Patient not taking: Reported on 6/14/2024) 100 mL 1    predniSONE (DELTASONE) 20 MG Tab Take 3 tabs daily x 2 days, then 2 tabs daily x 2 days, then 1 tab on final day. 11 Tablet 0    pantoprazole (PROTONIX) 20 MG tablet Take 1 Tablet by mouth every day. (Patient not taking: Reported on 7/17/2022) 90 Tablet 3    levothyroxine (SYNTHROID) 137 MCG Tab TAKE 1 TABLET BY MOUTH TWICE DAILY 96 Tablet 1    tizanidine (ZANAFLEX) 6 MG capsule TAKE 1 CAPSULE BY MOUTH EVERY 12 HOURS AS NEEDED FOR MUSCLE SPASM (Patient not taking: Reported on 6/14/2024)      sucralfate (CARAFATE) 1 GM Tab Take 1 Tablet by mouth 4 Times a Day,Before Meals and at Bedtime. 120 Tablet 3    nitrofurantoin (MACROBID) 100 MG Cap Take 1 Capsule by mouth 2 times a day. (Patient not taking: Reported on 7/17/2022) 10 Capsule 0    ondansetron (ZOFRAN ODT) 4 MG TABLET DISPERSIBLE Take 1 Tablet by mouth every 6 hours as needed for Nausea. (Patient not taking: Reported on 7/17/2022) 10 Tablet 0    albuterol 108 (90 Base) MCG/ACT Aero Soln inhalation aerosol Inhale 2 Puffs every four hours as needed for Shortness of Breath. 8.5 g 4    promethazine-dextromethorphan (PROMETHAZINE-DM) 6.25-15 MG/5ML syrup Take 5 mL by mouth every four hours as needed. (Patient not taking: Reported on 7/17/2022) 120 mL 0     omeprazole (PRILOSEC) 40 MG delayed-release capsule Take 1 Capsule by mouth 2 times a day. (Patient not taking: Reported on 2024) 180 Capsule 3    ACETAMINOPHEN EXTRA STRENGTH 500 MG Tab       metroNIDAZOLE (METROGEL-VAGINAL) 0.75 % Gel INSERT 1 APPLICATORFUL VAGINALLY AT BEDTIME FOR 5 DAYS. (Patient not taking: No sig reported)      NON SPECIFIED amberen - menapause supplement (Patient not taking: No sig reported)       No current facility-administered medications on file prior to visit.     Social History     Socioeconomic History    Marital status:      Spouse name: Not on file    Number of children: Not on file    Years of education: Not on file    Highest education level: Not on file   Occupational History    Not on file   Tobacco Use    Smoking status: Former     Current packs/day: 0.00     Types: Cigarettes     Start date: 1997     Quit date: 2017     Years since quittin.4    Smokeless tobacco: Never    Tobacco comments:     1 cigarette week   Vaping Use    Vaping status: Never Used   Substance and Sexual Activity    Alcohol use: Yes     Comment: OCC    Drug use: No    Sexual activity: Yes     Partners: Male     Birth control/protection: Surgical   Other Topics Concern    Not on file   Social History Narrative    Not on file     Social Determinants of Health     Financial Resource Strain: Not on file   Food Insecurity: Not on file   Transportation Needs: Not on file   Physical Activity: Not on file   Stress: Not on file   Social Connections: Not on file   Intimate Partner Violence: Not on file   Housing Stability: Not on file     Breast Cancer-related family history is not on file.      Review of Systems   Constitutional:  Positive for malaise/fatigue. Negative for chills and fever.   HENT:  Positive for congestion and sinus pain. Negative for sore throat.    Eyes:  Negative for discharge.   Respiratory:  Negative for cough.    Cardiovascular:  Negative for chest pain and orthopnea.  "  Gastrointestinal:  Negative for diarrhea and nausea.   Musculoskeletal:  Negative for myalgias.   Neurological:  Positive for headaches.   Endo/Heme/Allergies:  Negative for environmental allergies.              Objective     /66 (BP Location: Left arm, Patient Position: Sitting, BP Cuff Size: Large adult)   Pulse 91   Temp 36.8 °C (98.3 °F) (Temporal)   Resp 15   Ht 1.6 m (5' 3\")   Wt 113 kg (248 lb 1.6 oz)   LMP 11/21/2015   SpO2 95%   BMI 43.95 kg/m²      Physical Exam  Vitals and nursing note reviewed.   Constitutional:       General: She is not in acute distress.     Appearance: She is well-developed.   HENT:      Head: Normocephalic and atraumatic.      Right Ear: Tympanic membrane, ear canal and external ear normal. No middle ear effusion. Tympanic membrane is not injected or perforated.      Left Ear: Tympanic membrane, ear canal and external ear normal.  No middle ear effusion. Tympanic membrane is not injected or perforated.      Nose: Mucosal edema, congestion and rhinorrhea present.      Mouth/Throat:      Pharynx: No oropharyngeal exudate or posterior oropharyngeal erythema.   Eyes:      General:         Right eye: No discharge.         Left eye: No discharge.      Conjunctiva/sclera: Conjunctivae normal.   Cardiovascular:      Rate and Rhythm: Normal rate and regular rhythm.      Heart sounds: Normal heart sounds. No murmur heard.  Pulmonary:      Effort: Pulmonary effort is normal. No respiratory distress.      Breath sounds: Normal breath sounds.   Musculoskeletal:         General: Normal range of motion.      Cervical back: Normal range of motion and neck supple.      Comments: Normal movement of all 4 extremities.   Lymphadenopathy:      Cervical: No cervical adenopathy.      Upper Body:      Right upper body: No supraclavicular adenopathy.      Left upper body: No supraclavicular adenopathy.   Skin:     General: Skin is warm and dry.      Comments: Healing wound on right calf with " surrounding erythema    Neurological:      Mental Status: She is alert and oriented to person, place, and time.      Gait: Gait normal.   Psychiatric:         Behavior: Behavior normal.         Thought Content: Thought content normal.                             Assessment & Plan        1. Acute bacterial sinusitis  amoxicillin-clavulanate (AUGMENTIN) 875-125 MG Tab      2. Skin infection  amoxicillin-clavulanate (AUGMENTIN) 875-125 MG Tab      3. Seasonal allergies  cetirizine (ZYRTEC) 10 MG Tab    fluticasone (FLONASE) 50 MCG/ACT nasal spray      4. Vitamin D deficiency  Cholecalciferol (VITAMIN D3) 1.25 MG (26301 UT) Cap        Augmentin.  Refills on requested medications.  Differential diagnosis, natural history, supportive care, and indications for immediate follow-up were discussed.

## 2024-07-02 ENCOUNTER — APPOINTMENT (OUTPATIENT)
Dept: ADMISSIONS | Facility: MEDICAL CENTER | Age: 54
End: 2024-07-02
Attending: INTERNAL MEDICINE
Payer: MEDICAID

## 2024-07-09 ENCOUNTER — PRE-ADMISSION TESTING (OUTPATIENT)
Dept: ADMISSIONS | Facility: MEDICAL CENTER | Age: 54
End: 2024-07-09
Attending: INTERNAL MEDICINE
Payer: MEDICAID

## 2024-07-09 VITALS — BODY MASS INDEX: 43.95 KG/M2 | HEIGHT: 63 IN

## 2024-07-09 RX ORDER — PANTOPRAZOLE SODIUM 40 MG/1
40 TABLET, DELAYED RELEASE ORAL 2 TIMES DAILY
COMMUNITY

## 2024-07-09 RX ORDER — LEVOTHYROXINE SODIUM 0.12 MG/1
125 TABLET ORAL
COMMUNITY

## 2024-07-09 RX ORDER — PANTOPRAZOLE SODIUM 40 MG/10ML
40 INJECTION, POWDER, LYOPHILIZED, FOR SOLUTION INTRAVENOUS DAILY
COMMUNITY
End: 2024-07-09
